# Patient Record
Sex: MALE | Race: WHITE | NOT HISPANIC OR LATINO | Employment: FULL TIME | URBAN - METROPOLITAN AREA
[De-identification: names, ages, dates, MRNs, and addresses within clinical notes are randomized per-mention and may not be internally consistent; named-entity substitution may affect disease eponyms.]

---

## 2017-02-19 ENCOUNTER — OFFICE VISIT - HEALTHEAST (OUTPATIENT)
Dept: FAMILY MEDICINE | Facility: CLINIC | Age: 52
End: 2017-02-19

## 2017-02-19 DIAGNOSIS — M10.9 ACUTE GOUT OF RIGHT HAND, UNSPECIFIED CAUSE: ICD-10-CM

## 2017-02-25 ENCOUNTER — COMMUNICATION - HEALTHEAST (OUTPATIENT)
Dept: FAMILY MEDICINE | Facility: CLINIC | Age: 52
End: 2017-02-25

## 2017-05-22 ENCOUNTER — COMMUNICATION - HEALTHEAST (OUTPATIENT)
Dept: FAMILY MEDICINE | Facility: CLINIC | Age: 52
End: 2017-05-22

## 2017-05-22 DIAGNOSIS — I10 ESSENTIAL HYPERTENSION: ICD-10-CM

## 2017-06-08 ENCOUNTER — OFFICE VISIT - HEALTHEAST (OUTPATIENT)
Dept: FAMILY MEDICINE | Facility: CLINIC | Age: 52
End: 2017-06-08

## 2017-06-08 DIAGNOSIS — M76.60 ACHILLES TENDONITIS: ICD-10-CM

## 2017-06-08 DIAGNOSIS — M79.671 PAIN OF RIGHT HEEL: ICD-10-CM

## 2017-06-26 ENCOUNTER — RECORDS - HEALTHEAST (OUTPATIENT)
Dept: ADMINISTRATIVE | Facility: OTHER | Age: 52
End: 2017-06-26

## 2017-09-07 ENCOUNTER — COMMUNICATION - HEALTHEAST (OUTPATIENT)
Dept: FAMILY MEDICINE | Facility: CLINIC | Age: 52
End: 2017-09-07

## 2017-09-07 DIAGNOSIS — I10 ESSENTIAL HYPERTENSION: ICD-10-CM

## 2017-09-11 ENCOUNTER — COMMUNICATION - HEALTHEAST (OUTPATIENT)
Dept: FAMILY MEDICINE | Facility: CLINIC | Age: 52
End: 2017-09-11

## 2018-01-25 ENCOUNTER — COMMUNICATION - HEALTHEAST (OUTPATIENT)
Dept: FAMILY MEDICINE | Facility: CLINIC | Age: 53
End: 2018-01-25

## 2018-01-25 DIAGNOSIS — I10 ESSENTIAL HYPERTENSION: ICD-10-CM

## 2018-05-28 ENCOUNTER — COMMUNICATION - HEALTHEAST (OUTPATIENT)
Dept: FAMILY MEDICINE | Facility: CLINIC | Age: 53
End: 2018-05-28

## 2018-05-28 DIAGNOSIS — I10 ESSENTIAL HYPERTENSION: ICD-10-CM

## 2018-07-02 ENCOUNTER — OFFICE VISIT - HEALTHEAST (OUTPATIENT)
Dept: FAMILY MEDICINE | Facility: CLINIC | Age: 53
End: 2018-07-02

## 2018-07-02 DIAGNOSIS — R10.9 RIGHT SIDED ABDOMINAL PAIN: ICD-10-CM

## 2018-07-02 LAB
ALBUMIN SERPL-MCNC: 4.3 G/DL (ref 3.5–5)
ALP SERPL-CCNC: 95 U/L (ref 45–120)
ALT SERPL W P-5'-P-CCNC: 24 U/L (ref 0–45)
ANION GAP SERPL CALCULATED.3IONS-SCNC: 10 MMOL/L (ref 5–18)
AST SERPL W P-5'-P-CCNC: 22 U/L (ref 0–40)
BASOPHILS # BLD AUTO: 0.1 THOU/UL (ref 0–0.2)
BASOPHILS NFR BLD AUTO: 1 % (ref 0–2)
BILIRUB DIRECT SERPL-MCNC: 0.4 MG/DL
BILIRUB SERPL-MCNC: 1.6 MG/DL (ref 0–1)
BUN SERPL-MCNC: 7 MG/DL (ref 8–22)
CALCIUM SERPL-MCNC: 10.9 MG/DL (ref 8.5–10.5)
CHLORIDE BLD-SCNC: 103 MMOL/L (ref 98–107)
CO2 SERPL-SCNC: 25 MMOL/L (ref 22–31)
CREAT SERPL-MCNC: 0.79 MG/DL (ref 0.7–1.3)
EOSINOPHIL # BLD AUTO: 0.1 THOU/UL (ref 0–0.4)
EOSINOPHIL NFR BLD AUTO: 2 % (ref 0–6)
ERYTHROCYTE [DISTWIDTH] IN BLOOD BY AUTOMATED COUNT: 14 % (ref 11–14.5)
GFR SERPL CREATININE-BSD FRML MDRD: >60 ML/MIN/1.73M2
GLUCOSE BLD-MCNC: 96 MG/DL (ref 70–125)
HCT VFR BLD AUTO: 40.4 % (ref 40–54)
HGB BLD-MCNC: 13.6 G/DL (ref 14–18)
LIPASE SERPL-CCNC: 34 U/L (ref 0–52)
LYMPHOCYTES # BLD AUTO: 2.3 THOU/UL (ref 0.8–4.4)
LYMPHOCYTES NFR BLD AUTO: 44 % (ref 20–40)
MCH RBC QN AUTO: 29.7 PG (ref 27–34)
MCHC RBC AUTO-ENTMCNC: 33.5 G/DL (ref 32–36)
MCV RBC AUTO: 89 FL (ref 80–100)
MONOCYTES # BLD AUTO: 0.5 THOU/UL (ref 0–0.9)
MONOCYTES NFR BLD AUTO: 9 % (ref 2–10)
NEUTROPHILS # BLD AUTO: 2.4 THOU/UL (ref 2–7.7)
NEUTROPHILS NFR BLD AUTO: 44 % (ref 50–70)
PLATELET # BLD AUTO: 233 THOU/UL (ref 140–440)
PMV BLD AUTO: 7 FL (ref 7–10)
POTASSIUM BLD-SCNC: 4.2 MMOL/L (ref 3.5–5)
PROT SERPL-MCNC: 7.2 G/DL (ref 6–8)
RBC # BLD AUTO: 4.57 MILL/UL (ref 4.4–6.2)
SODIUM SERPL-SCNC: 138 MMOL/L (ref 136–145)
WBC: 5.3 THOU/UL (ref 4–11)

## 2018-07-03 ENCOUNTER — OFFICE VISIT - HEALTHEAST (OUTPATIENT)
Dept: FAMILY MEDICINE | Facility: CLINIC | Age: 53
End: 2018-07-03

## 2018-07-03 ENCOUNTER — HOSPITAL ENCOUNTER (OUTPATIENT)
Dept: CT IMAGING | Facility: CLINIC | Age: 53
Discharge: HOME | End: 2018-07-03

## 2018-07-03 DIAGNOSIS — M10.9 GOUTY ARTHROPATHY: ICD-10-CM

## 2018-07-03 DIAGNOSIS — R93.5 ABNORMAL CT SCAN, PELVIS: ICD-10-CM

## 2018-07-03 DIAGNOSIS — I10 ESSENTIAL HYPERTENSION: ICD-10-CM

## 2018-07-03 DIAGNOSIS — R10.9 RIGHT SIDED ABDOMINAL PAIN: ICD-10-CM

## 2018-07-03 DIAGNOSIS — R10.11 RUQ ABDOMINAL PAIN: ICD-10-CM

## 2018-07-03 LAB — URATE SERPL-MCNC: 6 MG/DL (ref 3–8)

## 2018-07-03 ASSESSMENT — MIFFLIN-ST. JEOR: SCORE: 1796.51

## 2018-08-02 ENCOUNTER — COMMUNICATION - HEALTHEAST (OUTPATIENT)
Dept: FAMILY MEDICINE | Facility: CLINIC | Age: 53
End: 2018-08-02

## 2018-10-05 ENCOUNTER — COMMUNICATION - HEALTHEAST (OUTPATIENT)
Dept: FAMILY MEDICINE | Facility: CLINIC | Age: 53
End: 2018-10-05

## 2018-10-05 DIAGNOSIS — I10 ESSENTIAL HYPERTENSION: ICD-10-CM

## 2019-03-28 ENCOUNTER — RECORDS - HEALTHEAST (OUTPATIENT)
Dept: ADMINISTRATIVE | Facility: OTHER | Age: 54
End: 2019-03-28

## 2019-08-05 ENCOUNTER — OFFICE VISIT - HEALTHEAST (OUTPATIENT)
Dept: FAMILY MEDICINE | Facility: CLINIC | Age: 54
End: 2019-08-05

## 2019-08-05 DIAGNOSIS — Z01.818 PREOP GENERAL PHYSICAL EXAM: ICD-10-CM

## 2019-08-05 DIAGNOSIS — E55.9 VITAMIN D DEFICIENCY: ICD-10-CM

## 2019-08-05 DIAGNOSIS — M10.9 GOUTY ARTHROPATHY: ICD-10-CM

## 2019-08-05 DIAGNOSIS — H26.9 CATARACT OF BOTH EYES, UNSPECIFIED CATARACT TYPE: ICD-10-CM

## 2019-08-05 DIAGNOSIS — I10 ESSENTIAL HYPERTENSION: ICD-10-CM

## 2019-08-05 DIAGNOSIS — D64.9 ANEMIA, UNSPECIFIED TYPE: ICD-10-CM

## 2019-08-05 DIAGNOSIS — Z12.11 SCREEN FOR COLON CANCER: ICD-10-CM

## 2019-08-05 LAB
ANION GAP SERPL CALCULATED.3IONS-SCNC: 8 MMOL/L (ref 5–18)
BUN SERPL-MCNC: 9 MG/DL (ref 8–22)
CALCIUM SERPL-MCNC: 10.9 MG/DL (ref 8.5–10.5)
CHLORIDE BLD-SCNC: 102 MMOL/L (ref 98–107)
CO2 SERPL-SCNC: 28 MMOL/L (ref 22–31)
CREAT SERPL-MCNC: 0.95 MG/DL (ref 0.7–1.3)
ERYTHROCYTE [DISTWIDTH] IN BLOOD BY AUTOMATED COUNT: 13.7 % (ref 11–14.5)
GFR SERPL CREATININE-BSD FRML MDRD: >60 ML/MIN/1.73M2
GLUCOSE BLD-MCNC: 88 MG/DL (ref 70–125)
HCT VFR BLD AUTO: 38.6 % (ref 40–54)
HGB BLD-MCNC: 13.3 G/DL (ref 14–18)
MCH RBC QN AUTO: 30.9 PG (ref 27–34)
MCHC RBC AUTO-ENTMCNC: 34.5 G/DL (ref 32–36)
MCV RBC AUTO: 90 FL (ref 80–100)
PLATELET # BLD AUTO: 266 THOU/UL (ref 140–440)
PMV BLD AUTO: 7 FL (ref 7–10)
POTASSIUM BLD-SCNC: 4.4 MMOL/L (ref 3.5–5)
RBC # BLD AUTO: 4.3 MILL/UL (ref 4.4–6.2)
SODIUM SERPL-SCNC: 138 MMOL/L (ref 136–145)
URATE SERPL-MCNC: 9 MG/DL (ref 3–8)
WBC: 4.8 THOU/UL (ref 4–11)

## 2019-08-05 ASSESSMENT — MIFFLIN-ST. JEOR: SCORE: 1831.44

## 2019-08-06 LAB
25(OH)D3 SERPL-MCNC: 12.8 NG/ML (ref 30–80)
25(OH)D3 SERPL-MCNC: 12.8 NG/ML (ref 30–80)

## 2019-08-11 ENCOUNTER — AMBULATORY - HEALTHEAST (OUTPATIENT)
Dept: FAMILY MEDICINE | Facility: CLINIC | Age: 54
End: 2019-08-11

## 2019-08-11 DIAGNOSIS — M10.9 GOUTY ARTHROPATHY: ICD-10-CM

## 2019-08-11 DIAGNOSIS — E55.9 VITAMIN D DEFICIENCY: ICD-10-CM

## 2019-08-13 ENCOUNTER — COMMUNICATION - HEALTHEAST (OUTPATIENT)
Dept: FAMILY MEDICINE | Facility: CLINIC | Age: 54
End: 2019-08-13

## 2019-08-13 ENCOUNTER — COMMUNICATION - HEALTHEAST (OUTPATIENT)
Dept: INTERNAL MEDICINE | Facility: CLINIC | Age: 54
End: 2019-08-13

## 2019-10-09 ENCOUNTER — RECORDS - HEALTHEAST (OUTPATIENT)
Dept: ADMINISTRATIVE | Facility: OTHER | Age: 54
End: 2019-10-09

## 2019-10-14 ENCOUNTER — COMMUNICATION - HEALTHEAST (OUTPATIENT)
Dept: FAMILY MEDICINE | Facility: CLINIC | Age: 54
End: 2019-10-14

## 2019-10-14 DIAGNOSIS — I10 ESSENTIAL HYPERTENSION: ICD-10-CM

## 2019-10-16 ENCOUNTER — COMMUNICATION - HEALTHEAST (OUTPATIENT)
Dept: FAMILY MEDICINE | Facility: CLINIC | Age: 54
End: 2019-10-16

## 2019-10-16 DIAGNOSIS — E55.9 VITAMIN D DEFICIENCY: ICD-10-CM

## 2020-09-06 ENCOUNTER — COMMUNICATION - HEALTHEAST (OUTPATIENT)
Dept: FAMILY MEDICINE | Facility: CLINIC | Age: 55
End: 2020-09-06

## 2020-09-06 DIAGNOSIS — M10.9 GOUTY ARTHROPATHY: ICD-10-CM

## 2020-09-15 ENCOUNTER — COMMUNICATION - HEALTHEAST (OUTPATIENT)
Dept: FAMILY MEDICINE | Facility: CLINIC | Age: 55
End: 2020-09-15

## 2020-09-15 ENCOUNTER — OFFICE VISIT - HEALTHEAST (OUTPATIENT)
Dept: FAMILY MEDICINE | Facility: CLINIC | Age: 55
End: 2020-09-15

## 2020-09-15 DIAGNOSIS — M25.561 CHRONIC PAIN OF RIGHT KNEE: ICD-10-CM

## 2020-09-15 DIAGNOSIS — Z00.00 ROUTINE GENERAL MEDICAL EXAMINATION AT A HEALTH CARE FACILITY: ICD-10-CM

## 2020-09-15 DIAGNOSIS — D64.9 ANEMIA, UNSPECIFIED TYPE: ICD-10-CM

## 2020-09-15 DIAGNOSIS — G89.29 CHRONIC PAIN OF RIGHT KNEE: ICD-10-CM

## 2020-09-15 DIAGNOSIS — M10.9 GOUTY ARTHROPATHY: ICD-10-CM

## 2020-09-15 DIAGNOSIS — E55.9 VITAMIN D DEFICIENCY: ICD-10-CM

## 2020-09-15 DIAGNOSIS — I10 ESSENTIAL HYPERTENSION: ICD-10-CM

## 2020-09-15 DIAGNOSIS — Z23 ENCOUNTER FOR IMMUNIZATION: ICD-10-CM

## 2020-09-15 LAB
ALBUMIN SERPL-MCNC: 4.4 G/DL (ref 3.5–5)
ALP SERPL-CCNC: 99 U/L (ref 45–120)
ALT SERPL W P-5'-P-CCNC: 28 U/L (ref 0–45)
ANION GAP SERPL CALCULATED.3IONS-SCNC: 6 MMOL/L (ref 5–18)
AST SERPL W P-5'-P-CCNC: 25 U/L (ref 0–40)
BASOPHILS # BLD AUTO: 0 THOU/UL (ref 0–0.2)
BASOPHILS NFR BLD AUTO: 1 % (ref 0–2)
BILIRUB SERPL-MCNC: 1.5 MG/DL (ref 0–1)
BUN SERPL-MCNC: 6 MG/DL (ref 8–22)
CALCIUM SERPL-MCNC: 10.9 MG/DL (ref 8.5–10.5)
CHLORIDE BLD-SCNC: 104 MMOL/L (ref 98–107)
CHOLEST SERPL-MCNC: 209 MG/DL
CO2 SERPL-SCNC: 29 MMOL/L (ref 22–31)
CREAT SERPL-MCNC: 0.89 MG/DL (ref 0.7–1.3)
EOSINOPHIL # BLD AUTO: 0.2 THOU/UL (ref 0–0.4)
EOSINOPHIL NFR BLD AUTO: 4 % (ref 0–6)
ERYTHROCYTE [DISTWIDTH] IN BLOOD BY AUTOMATED COUNT: 13.8 % (ref 11–14.5)
FASTING STATUS PATIENT QL REPORTED: NO
GFR SERPL CREATININE-BSD FRML MDRD: >60 ML/MIN/1.73M2
GLUCOSE BLD-MCNC: 85 MG/DL (ref 70–125)
HCT VFR BLD AUTO: 40.7 % (ref 40–54)
HDLC SERPL-MCNC: 31 MG/DL
HGB BLD-MCNC: 13.8 G/DL (ref 14–18)
HIV 1+2 AB+HIV1 P24 AG SERPL QL IA: NEGATIVE
LDLC SERPL CALC-MCNC: 132 MG/DL
LYMPHOCYTES # BLD AUTO: 2 THOU/UL (ref 0.8–4.4)
LYMPHOCYTES NFR BLD AUTO: 48 % (ref 20–40)
MCH RBC QN AUTO: 31.1 PG (ref 27–34)
MCHC RBC AUTO-ENTMCNC: 34 G/DL (ref 32–36)
MCV RBC AUTO: 92 FL (ref 80–100)
MONOCYTES # BLD AUTO: 0.3 THOU/UL (ref 0–0.9)
MONOCYTES NFR BLD AUTO: 8 % (ref 2–10)
NEUTROPHILS # BLD AUTO: 1.7 THOU/UL (ref 2–7.7)
NEUTROPHILS NFR BLD AUTO: 39 % (ref 50–70)
PLATELET # BLD AUTO: 236 THOU/UL (ref 140–440)
PMV BLD AUTO: 7.4 FL (ref 7–10)
POTASSIUM BLD-SCNC: 4.4 MMOL/L (ref 3.5–5)
PROT SERPL-MCNC: 7.1 G/DL (ref 6–8)
RBC # BLD AUTO: 4.44 MILL/UL (ref 4.4–6.2)
SODIUM SERPL-SCNC: 139 MMOL/L (ref 136–145)
TRIGL SERPL-MCNC: 229 MG/DL
WBC: 4.3 THOU/UL (ref 4–11)

## 2020-09-15 ASSESSMENT — MIFFLIN-ST. JEOR: SCORE: 1783.81

## 2020-09-16 ENCOUNTER — AMBULATORY - HEALTHEAST (OUTPATIENT)
Dept: FAMILY MEDICINE | Facility: CLINIC | Age: 55
End: 2020-09-16

## 2020-09-16 DIAGNOSIS — E55.9 VITAMIN D DEFICIENCY: ICD-10-CM

## 2020-09-16 LAB
25(OH)D3 SERPL-MCNC: 21.5 NG/ML (ref 30–80)
25(OH)D3 SERPL-MCNC: 21.5 NG/ML (ref 30–80)

## 2020-09-21 ENCOUNTER — COMMUNICATION - HEALTHEAST (OUTPATIENT)
Dept: FAMILY MEDICINE | Facility: CLINIC | Age: 55
End: 2020-09-21

## 2020-12-09 ENCOUNTER — COMMUNICATION - HEALTHEAST (OUTPATIENT)
Dept: FAMILY MEDICINE | Facility: CLINIC | Age: 55
End: 2020-12-09

## 2020-12-09 DIAGNOSIS — M10.9 GOUTY ARTHROPATHY: ICD-10-CM

## 2021-02-16 ENCOUNTER — COMMUNICATION - HEALTHEAST (OUTPATIENT)
Dept: FAMILY MEDICINE | Facility: CLINIC | Age: 56
End: 2021-02-16

## 2021-02-16 DIAGNOSIS — E55.9 VITAMIN D DEFICIENCY: ICD-10-CM

## 2021-02-18 ENCOUNTER — AMBULATORY - HEALTHEAST (OUTPATIENT)
Dept: LAB | Facility: CLINIC | Age: 56
End: 2021-02-18

## 2021-02-18 DIAGNOSIS — E55.9 VITAMIN D DEFICIENCY: ICD-10-CM

## 2021-02-19 LAB
25(OH)D3 SERPL-MCNC: 19.1 NG/ML (ref 30–80)
25(OH)D3 SERPL-MCNC: 19.1 NG/ML (ref 30–80)

## 2021-02-22 ENCOUNTER — AMBULATORY - HEALTHEAST (OUTPATIENT)
Dept: FAMILY MEDICINE | Facility: CLINIC | Age: 56
End: 2021-02-22

## 2021-02-22 DIAGNOSIS — E55.9 VITAMIN D DEFICIENCY: ICD-10-CM

## 2021-03-15 ENCOUNTER — OFFICE VISIT - HEALTHEAST (OUTPATIENT)
Dept: FAMILY MEDICINE | Facility: CLINIC | Age: 56
End: 2021-03-15

## 2021-03-15 DIAGNOSIS — I10 ESSENTIAL HYPERTENSION: ICD-10-CM

## 2021-03-15 DIAGNOSIS — E55.9 VITAMIN D DEFICIENCY: ICD-10-CM

## 2021-03-15 DIAGNOSIS — Z12.11 SCREEN FOR COLON CANCER: ICD-10-CM

## 2021-03-15 DIAGNOSIS — M10.9 GOUTY ARTHROPATHY: ICD-10-CM

## 2021-03-15 DIAGNOSIS — Z71.6 ENCOUNTER FOR TOBACCO USE CESSATION COUNSELING: ICD-10-CM

## 2021-03-15 ASSESSMENT — MIFFLIN-ST. JEOR: SCORE: 1736.18

## 2021-03-16 ENCOUNTER — COMMUNICATION - HEALTHEAST (OUTPATIENT)
Dept: FAMILY MEDICINE | Facility: CLINIC | Age: 56
End: 2021-03-16

## 2021-04-07 ENCOUNTER — COMMUNICATION - HEALTHEAST (OUTPATIENT)
Dept: FAMILY MEDICINE | Facility: CLINIC | Age: 56
End: 2021-04-07

## 2021-04-07 DIAGNOSIS — Z71.6 ENCOUNTER FOR TOBACCO USE CESSATION COUNSELING: ICD-10-CM

## 2021-05-10 ENCOUNTER — COMMUNICATION - HEALTHEAST (OUTPATIENT)
Dept: FAMILY MEDICINE | Facility: CLINIC | Age: 56
End: 2021-05-10

## 2021-05-10 DIAGNOSIS — E55.9 VITAMIN D DEFICIENCY: ICD-10-CM

## 2021-05-13 ENCOUNTER — COMMUNICATION - HEALTHEAST (OUTPATIENT)
Dept: FAMILY MEDICINE | Facility: CLINIC | Age: 56
End: 2021-05-13

## 2021-05-13 DIAGNOSIS — E55.9 VITAMIN D DEFICIENCY: ICD-10-CM

## 2021-05-29 ENCOUNTER — RECORDS - HEALTHEAST (OUTPATIENT)
Dept: ADMINISTRATIVE | Facility: CLINIC | Age: 56
End: 2021-05-29

## 2021-05-30 VITALS — WEIGHT: 213 LBS | BODY MASS INDEX: 29.71 KG/M2

## 2021-05-31 ENCOUNTER — RECORDS - HEALTHEAST (OUTPATIENT)
Dept: ADMINISTRATIVE | Facility: CLINIC | Age: 56
End: 2021-05-31

## 2021-05-31 VITALS — BODY MASS INDEX: 30.27 KG/M2 | WEIGHT: 217 LBS

## 2021-05-31 NOTE — TELEPHONE ENCOUNTER
----- Message from Jessica Jones MD sent at 8/11/2019  4:44 PM CDT -----  Your vitamin D level is low.  This can cause you to feel tired, can worsen your immune system's function, and can worsen your bone density.  Let's bring your level up quickly with high dose vitamin D 50,000 units TWICE WEEKLY for 8 weeks.  We should then recheck your level (a lab only visit).

## 2021-06-01 ENCOUNTER — RECORDS - HEALTHEAST (OUTPATIENT)
Dept: ADMINISTRATIVE | Facility: CLINIC | Age: 56
End: 2021-06-01

## 2021-06-01 VITALS — BODY MASS INDEX: 29.36 KG/M2 | WEIGHT: 210.5 LBS

## 2021-06-01 VITALS — WEIGHT: 209.3 LBS | BODY MASS INDEX: 29.3 KG/M2 | HEIGHT: 71 IN

## 2021-06-02 NOTE — TELEPHONE ENCOUNTER
Refill Approved    Rx renewed per Medication Renewal Policy. Medication was last renewed on 10/5/18.    Kelli Marcum, [facility] Triage/Med Refill 10/15/2019     Requested Prescriptions   Pending Prescriptions Disp Refills     lisinopril (PRINIVIL,ZESTRIL) 5 MG tablet [Pharmacy Med Name: LISINOPRIL 5 MG TABLET] 30 tablet 11     Sig: TAKE 1 TABLET BY MOUTH EVERY DAY       [facility] [employer] Refill Protocol Passed - 10/14/2019  1:22 AM        Passed - PCP or prescribing provider visit in past 12 months       Last office visit with prescriber/PCP: 7/3/2018 Jessica Jones MD OR same dept: Visit date not found OR same specialty: 7/3/2018 Jessica Jones MD  Last physical: 8/5/2019 Last MTM visit: Visit date not found   Next visit within 3 mo: Visit date not found  Next physical within 3 mo: Visit date not found  Prescriber OR PCP: Jessica Jones MD  Last diagnosis associated with med order: 1. Essential hypertension  - lisinopril (PRINIVIL,ZESTRIL) 5 MG tablet [Pharmacy Med Name: LISINOPRIL 5 MG TABLET]; TAKE 1 TABLET BY MOUTH EVERY DAY  Dispense: 30 tablet; Refill: 11    If protocol passes may refill for 12 months if within 3 months of last provider visit (or a total of 15 months).             Passed - Serum Potassium in past 12 months     Lab Results   Component Value Date    Potassium 4.4 08/05/2019             Passed - Blood pressure filed in past 12 months     BP Readings from Last 1 Encounters:   08/05/19 120/76             Passed - Serum Creatinine in past 12 months     Creatinine   Date Value Ref Range Status   08/05/2019 0.95 0.70 - 1.30 mg/dL Final

## 2021-06-03 VITALS — HEIGHT: 71 IN | WEIGHT: 217 LBS | BODY MASS INDEX: 30.38 KG/M2

## 2021-06-05 VITALS
HEIGHT: 71 IN | DIASTOLIC BLOOD PRESSURE: 88 MMHG | HEART RATE: 48 BPM | RESPIRATION RATE: 20 BRPM | TEMPERATURE: 97.9 F | WEIGHT: 196 LBS | BODY MASS INDEX: 27.44 KG/M2 | SYSTOLIC BLOOD PRESSURE: 140 MMHG | OXYGEN SATURATION: 99 %

## 2021-06-05 VITALS
BODY MASS INDEX: 28.91 KG/M2 | HEART RATE: 80 BPM | DIASTOLIC BLOOD PRESSURE: 98 MMHG | WEIGHT: 206.5 LBS | SYSTOLIC BLOOD PRESSURE: 168 MMHG | HEIGHT: 71 IN

## 2021-06-08 NOTE — PROGRESS NOTES
S: Mr. Lawson is a 51 year old with a history of gout who presents with pain in his right thumb since Monday. He has a history of gout which has always manifested in his toes in the past and has never had symptoms in his hands. He reports that the pain and the way that his thumb looks is very similar to his previous attacks. He called his pharmacy and PCP on Thursday to get refills of Prednisone which is usually what works the best for his flares, but had been unable to get a prescription filled. He has been taking tylenol and had not gotten any significant relief from the pain. He denies any other illness recently. Has not been exposed to ticks or bites that he knows of. Eats very little red meat and has not been drinking any beer. He reports that in the past there was discussion about uric acid lowering therapy, but was decided against because he had not had frequent flares. Reviewed some previous labs and clinic records with the patient. Last uric acid level was drawn in 2012 when not having flare and was 5.2, previously elevated to 9.2 in 2011. Patient is interested in considering therapy but has not seen his PCP in close to 1.5 years and would like to make an appointment to discuss it with her.     O:  Vitals:    02/19/17 1549   BP: 126/80   Pulse: 60   Resp: 18   Temp: 98.2  F (36.8  C)   SpO2: 97%     General: Calm appearing male in no apparent distress  Extremities: Swollen and erythematous metacarpophalangeal joint of the right hand. Tender to palpation. ROM intact in all directions but limited slightly due to pain. Sensation intact in distal thumb and all other phalanges. No swelling in the left hand or the wrists bilaterally.     A/P:  1. Acute gout of right hand, unspecified cause: Will treat symptoms with Prednisone taper which has worked for him in the past. Instructed to follow-up with PCP to discuss further management of gout. Return to clinic if not better in 24-48 hours.  - predniSONE (DELTASONE)  10 MG tablet; TAKE 4 TABS ONCE DAILY FOR 3 DAYS, THEN 3 TABS DAILY FOR 2 DAYS, THEN 2 TABS DAILY FOR 2 DAYS AS DIRECTED  Dispense: 22 tablet; Refill: 0      Va King MD - PGY-3

## 2021-06-11 NOTE — TELEPHONE ENCOUNTER
RN cannot approve Refill Request    RN can NOT refill this medication Protocol failed and NO refill given. Last office visit: 7/3/2018 Jessica Jones MD Last Physical: 8/5/2019 Last MTM visit: Visit date not found Last visit same specialty: 7/3/2018 Jessica Jones MD.  Next visit within 3 mo: Visit date not found  Next physical within 3 mo: Visit date not found      Ruthie Waggoner, [facility] Triage/Med Refill 9/6/2020    Requested Prescriptions   Pending Prescriptions Disp Refills     allopurinoL (ZYLOPRIM) 300 MG tablet [Pharmacy Med Name: ALLOPURINOL 300 MG TABLET] 30 tablet 11     Sig: TAKE 1 TABLET BY MOUTH EVERY DAY       There is no refill protocol information for this order

## 2021-06-11 NOTE — PROGRESS NOTES
Assessment:     1. Routine general medical examination at a health care facility  Influenza, Recombinant, Inj, Quadrivalent, PF, 18+YRS    Lipid Cascade    HIV Antigen/Antibody Screening [place]    Ambulatory referral for Colonoscopy   2. Essential hypertension  Comprehensive Metabolic Panel    lisinopriL (PRINIVIL,ZESTRIL) 10 MG tablet   3. Gout     4. Anemia, unspecified type  HM1(CBC and Differential)    HM1 (CBC with Diff)   5. Vitamin D deficiency  Vitamin D, Total (25-Hydroxy)   6. Encounter for immunization  CANCELED: Influenza, Recombinant, Inj, Quadrivalent, PF, 18+YRS   7. Chronic pain of right knee  Ambulatory referral to Orthopedic Surgery        Plan:      1. Routine healthcare maintenance.  Preventative cares reviewed.  Influenza vaccine administered today.  Will check fasting lipids and routine HIV screening.  Referral placed for colonoscopy.  Return to clinic in 1 year for next annual exam or sooner with any new concerns.    2.  Blood pressure is elevated today at 168/98.  Will increase lisinopril to 10 mg once daily.  Patient is advised to follow-up in 2 weeks for blood pressure check.  Notify us sooner with any symptoms.  Encouraged healthy lifestyle modifications regards to diet and exercise.    3.  Gout is chronic and stable.  Continue allopurinol for management of gout.    4.  History of anemia noted.  Will check complete blood count today to ensure stability.    5.  History of vitamin D deficiency monitor.  Will check vitamin D level.    6.  Influenza vaccine administered today.    7.  Referral placed to orthopedics for further evaluation of right knee pain.  We discussed conservative measures in the meantime including rest, elevation, heat and Tylenol for pain management.               Subjective:      Arthur Lawson is a 55 y.o. male who presents for an annual exam.  Patient is doing well overall today.  Past medical history is significant for hypertension, currently on lisinopril 5 mg  daily.  Does not routinely check blood pressure at home.  Was previously on higher dose of lisinopril but per patient had episodes of dizziness which she attributed to the high dose of lisinopril.  Denies any chest pain, shortness of breath, headaches or blurred vision.  He describes having history of Lasek eye surgery and continues to have problems with his eyes due to this surgery.  he has gout and remains on allopurinol for preventative management.  Tolerating well.  Denies any recent flareups.  Typically managed with ibuprofen.  He has history of anemia with hemoglobin in the 13-14 range per chart review.  Also history of vitamin D deficiency noted.  He reports having some right knee pain which is sometimes attributed to his right hip as well.  Pain has been going on for several months without known injury.  He reports having his right knee evaluated by orthopedics a few years ago without any abnormalities found on imaging.  He is interested in another referral today.  Patient would like routine labs checked today.  He is due for colonoscopy.  He is also interested in influenza vaccine.  Patient denies any recent illness reports feeling well today.  No additional concerns.    Healthy Habits:  Regular Exercise: Yes  Healthy Diet: Yes  Colonoscopy: Yes, referral placed  Lipid Profile: Yes  Glucose Screen: Yes    Immunization History   Administered Date(s) Administered     Td, Adult, Absorbed 07/16/1993, 01/01/2003     Tdap 08/05/2019     Immunization status: up to date and documented.    Current Outpatient Medications   Medication Sig Dispense Refill     allopurinoL (ZYLOPRIM) 300 MG tablet TAKE 1 TABLET BY MOUTH EVERY DAY 30 tablet 1     cholecalciferol, vitamin D3, 50,000 unit capsule Take one cap by mouth TWICE WEEKLY for 8 weeks 16 capsule 0     ibuprofen (ADVIL,MOTRIN) 200 MG tablet Take 200 mg by mouth every 6 (six) hours as needed for pain.       lisinopriL (PRINIVIL,ZESTRIL) 10 MG tablet Take 1 tablet (10  mg total) by mouth daily. 30 tablet 11     No current facility-administered medications for this visit.      Past Medical History:   Diagnosis Date     Essential Hypertension     Created by Conversion      Gout     Created by Conversion      Normochromic, Normocytic Anemia     Created by Conversion      Tendonitis Of The Achilles Tendon     Created by Conversion      Past Surgical History:   Procedure Laterality Date     FINGER SURGERY       INGUINAL HERNIA REPAIR       left knee arthroscopy       REFRACTIVE SURGERY       Hydrochlorothiazide; Penicillins; and Polycillin  Family History   Problem Relation Age of Onset     Colon cancer Father      Retinal detachment Father      Pacemaker Father      Retinal detachment Paternal Grandfather      Social History     Socioeconomic History     Marital status:      Spouse name: Nikkie     Number of children: Not on file     Years of education: Not on file     Highest education level: Not on file   Occupational History     Occupation:    Social Needs     Financial resource strain: Not on file     Food insecurity     Worry: Not on file     Inability: Not on file     Transportation needs     Medical: Not on file     Non-medical: Not on file   Tobacco Use     Smoking status: Never Smoker     Smokeless tobacco: Current User     Tobacco comment: Chews.   Substance and Sexual Activity     Alcohol use: Yes     Frequency: 2-4 times a month     Comment: Socially     Drug use: No     Sexual activity: Not on file   Lifestyle     Physical activity     Days per week: Not on file     Minutes per session: Not on file     Stress: Not on file   Relationships     Social connections     Talks on phone: Not on file     Gets together: Not on file     Attends Muslim service: Not on file     Active member of club or organization: Not on file     Attends meetings of clubs or organizations: Not on file     Relationship status: Not on file     Intimate partner violence     Fear of current  "or ex partner: Not on file     Emotionally abused: Not on file     Physically abused: Not on file     Forced sexual activity: Not on file   Other Topics Concern     Not on file   Social History Narrative     Not on file       Review of Systems  Comprehensive ROS: as above, otherwise all negative.           Objective:     BP (!) 168/98   Pulse 80   Ht 5' 11\" (1.803 m)   Wt 206 lb 8 oz (93.7 kg)   BMI 28.80 kg/m    Body mass index is 28.8 kg/m .    Physical    General Appearance:    Alert, cooperative, no distress, appears stated age.     Head:    Normocephalic, without obvious abnormality, atraumatic   Eyes:    PERRL, conjunctiva/corneas clear, EOM's intact, fundi     benign, both eyes        Ears:    Normal TM's and external ear canals, both ears   Nose:   Nares normal, septum midline, mucosa normal, no drainage    or sinus tenderness   Throat:   Lips, mucosa, and tongue normal; teeth and gums normal   Neck:   Supple, symmetrical, trachea midline, no adenopathy.   Back:     Symmetric, no curvature, ROM normal, no CVA tenderness   Lungs:     Clear to auscultation bilaterally, respirations unlabored   Heart:    Regular rate and rhythm, S1 and S2 normal, no murmur, rub   or gallop   Abdomen:     Soft, non-tender, bowel sounds active all four quadrants,     no masses, no organomegaly.     Genitalia:    Deferred   Rectal:    Deferred   Extremities:   Extremities normal, atraumatic, no cyanosis or edema   Pulses:   2+ and symmetric all extremities   Skin:   Skin color, texture, turgor normal, no rashes or lesions   Lymph nodes:   Cervical, supraclavicular, and axillary nodes normal   Neurologic:   CNII-XII grossly intact.             This note has been dictated using voice recognition software. Any grammatical or context distortions are unintentional and inherent to the use of this software.     "

## 2021-06-11 NOTE — PROGRESS NOTES
"ASSESSMENT/PLAN:   1. Achilles tendonitis     2. Pain of right heel  XR Ankle Right 3 or More VWS    XR Foot Right 3 or More VWS       Negative xrays. Exam not consistent with gout flare. Presentation is very consistent with achilles tendonitis. We will immobilize him in a CAM boot x 6 weeks. Patient has a boot at home from prior bout with achilles tendonitis, so he will use that. Recommended other supportive cares. Recommended f/up with PCP in 6 weeks to ensure symptomatic improvement- at that point, he could come out of boot. If persistent symptoms, referral to podiatry would be appropriate.  At the end of the encounter, I discussed results, diagnosis, treatment plan. Discussed red flags for immediate return to clinic/ER, as well as indications for follow up if no improvement. Patient understood and agreed to plan. Patient was stable for discharge.      Patient Instructions:  Patient Instructions   Your x-rays did not show any broken bones.  There was mention of a scar on the second toe bone, likely related to prior injury.    Most likely, your symptoms today are due to Achilles tendinitis.    Please use her boot at home for 6 weeks.     Take Aleve 500 mg twice daily for pain.  Take Tylenol every 4-6 hours as needed for further pain on top of that.    Ice and elevate the leg as much as possible over the next several days.    Rest from any activities that exacerbate your pain.    Follow-up with your regular doctor in 6 weeks to ensure improvement.    Come back sooner if worsening pain or new problems.                    SUBJECTIVE:   Arthur Lawson is a 51 y.o. male with a history including gout, anemia, and HTN, who presents today for evaluation of right heel pain x 1 week. It is getting much worse. He says it started with a \"sharp pain\" every once in a while, but now today it is with every step. No known injuries. No recent changes in activity. No foot swelling or redness. He does have a history of gout in the " past but this feels very different. No fevers. No other joint pains.  He did have something similar to this before in his left foot and was found to be achilles tedonitis. He was put in a boot for this and had complete resolution.  He has not taken anything for pain yet.      Past Medical History:  Patient Active Problem List   Diagnosis     Gout     Normochromic, Normocytic Anemia     Essential Hypertension     Motion Sickness     Midback Pain     Acute bronchitis     Tendonitis Of The Achilles Tendon      Vitamin D deficiency       Surgical History:  Left arthroscopic knee surgery - 1982  Otherwise reviewed, noncontributory    Family History:  Reviewed; Non-contributory      Social History:    History   Smoking Status     Never Smoker   Smokeless Tobacco     Current User     Comment: Chews.     Smoking: none  Chew tobacco  Alcohol use: occasionally  Other drug use: none  Occupation: manager at [employer]      Current Medications:  Current Outpatient Prescriptions on File Prior to Visit   Medication Sig Dispense Refill     aspirin 81 MG EC tablet Take 81 mg by mouth daily.       lisinopril (PRINIVIL,ZESTRIL) 5 MG tablet TAKE ONE TABLET BY MOUTH ONCE DAILY 90 tablet 0     predniSONE (DELTASONE) 10 MG tablet TAKE 4 TABS ONCE DAILY FOR 3 DAYS, THEN 3 TABS DAILY FOR 2 DAYS, THEN 2 TABS DAILY FOR 2 DAYS AS DIRECTED. 22 tablet 0     predniSONE (DELTASONE) 10 MG tablet TAKE 4 TABS ONCE DAILY FOR 3 DAYS, THEN 3 TABS DAILY FOR 2 DAYS, THEN 2 TABS DAILY FOR 2 DAYS AS DIRECTED 22 tablet 0     predniSONE (DELTASONE) 10 MG tablet TAKE 4 TABS ONCE DAILY FOR 3 DAYS, THEN 3 TABS DAILY FOR 2 DAYS, THEN 2 TABS DAILY FOR 2 DAYS AS DIRECTED. 22 tablet 0     No current facility-administered medications on file prior to visit.        Allergies:   Allergies   Allergen Reactions     Hydrochlorothiazide      Gout flair       Penicillins      Polycillin        I personally reviewed patient's past medical, surgical, social, family  history and allergies.    ROS:  Review of Systems  See HPI, otherwise negative      OBJECTIVE:   Vitals:    06/08/17 1755   BP: 124/70   Patient Site: Right Arm   Patient Position: Sitting   Cuff Size: Adult Regular   Pulse: 73   Resp: 20   Temp: 97.6  F (36.4  C)   TempSrc: Oral   SpO2: 96%   Weight: 217 lb (98.4 kg)     General: Alert, orientated, no acute distress.  Appears stated age.  Musculoskeletal: Right lower extremity: DP pulse 2+. No gross deformities. No erythema or joint effusions. No bony tenderness. No tenderness over achilles tendon. No plantar fascia tenderness. AROM: patient reports with with ankle dorsiflexion, none with plantarflexion. 5/5 strength for both movements. Negative Rao test. Gait: patient walks with limp favoring left side.  Skin: Normal color, no rashes, abrasions or lacerations  Neuro: Alert and orientated appropriately.  Normal sensation and strength.  No focal deficits.         Radiology:  I personally ordered and viewed this study. I agree with below radiology findings.    Xr Ankle Right 3 Or More Vws    Result Date: 6/8/2017  XR ANKLE RIGHT 3 OR MORE VWS 6/8/2017 6:37 PM INDICATION: Pain in right foot COMPARISON: None. FINDINGS: Mild degenerative change of the right ankle. No fracture or dislocation. Calcaneal enthesophyte. Mild soft tissue swelling. This report was electronically interpreted by: Dr. Jaiden Singh DO ON 06/08/2017 at 18:41    Xr Foot Right 3 Or More Vws    Result Date: 6/8/2017  XR FOOT RIGHT 3 OR MORE VWS 6/8/2017 6:37 PM INDICATION: Pain in right foot COMPARISON: None. FINDINGS: Degenerative and hypertrophic changes most pronounced along the first digit MTP and interphalangeal joints. No fracture or dislocation. Mild cortical blurring of the second digit proximal phalanx at mid shaft, possibly related to prior injury. This report was electronically interpreted by: Dr. Jaiden Singh DO ON 06/08/2017 at 18:43

## 2021-06-13 NOTE — TELEPHONE ENCOUNTER
RN cannot approve Refill Request    RN can NOT refill this medication med is not covered by policy/route to provider. Last office visit: 7/3/2018 Jessica Jones MD Last Physical: 8/5/2019 Last MTM visit: Visit date not found Last visit same specialty: 7/3/2018 Jessica Jones MD.  Next visit within 3 mo: Visit date not found  Next physical within 3 mo: Visit date not found      Kelli Marcum, [facility] Triage/Med Refill 12/10/2020    Requested Prescriptions   Pending Prescriptions Disp Refills     allopurinoL (ZYLOPRIM) 300 MG tablet [Pharmacy Med Name: ALLOPURINOL 300 MG TABLET] 30 tablet 1     Sig: TAKE 1 TABLET BY MOUTH EVERY DAY       There is no refill protocol information for this order

## 2021-06-16 NOTE — PROGRESS NOTES
Assessment/Plan:     1. Essential hypertension  Adequately controlled on current regimen.  Encouraged continued efforts at healthy lifestyle habits including increased exercise, reduction in sodium intake, and congratulated him on his elimination of preceding beverages.  Continue to work on tobacco cessation.  - lisinopriL (PRINIVIL,ZESTRIL) 20 MG tablet; Take 1 tablet (20 mg total) by mouth daily.  Dispense: 90 tablet; Refill: 1    2. Encounter for tobacco use cessation counseling  Encouraged cessation.  Discussed options.  Prescription provided for Chantix.  Discussed common side effects, including risk of emotional changes and suicidal ideation, to notify immediately should this occur.  Notify with difficulties, otherwise we will follow-up at next visit.  - varenicline (CHANTIX STARTING MONTH BOX) 0.5 mg (11)- 1 mg (42) tablet; 1 wk before you stop smoking take 0.5mg daily on days 1-3, 0.5mg 2 times each day on days 4-7, then 1mg 2 times daily.  Dispense: 53 tablet; Refill: 0  - varenicline (CHANTIX CONTINUING MONTH BOX) 1 mg tablet; Take 1 tablet (1 mg total) by mouth 2 (two) times a day.  Dispense: 60 tablet; Refill: 1    3. Screen for colon cancer  Order placed for screening Cologuard after discussing colon cancer screening options.    4. Gout  Continue allopurinol at current dose.    5. Vitamin D deficiency  Encourage initiation of vitamin D 5000 units daily once high-dose vitamin D is completed.      There are no Patient Instructions on file for this visit.     Return in about 6 months (around 9/15/2021) for Annual Preventive Care Visit.      Subjective:      Arthur Lawson is a 55 y.o. male presented to clinic today for follow-up of hypertension.  At last visit his lisinopril dose was increased, this was in September.  He has a home blood pressure cuff, notes his blood pressures are typically in the 1 20-1 40s over 80s to 90s, overall in normal range.  Denies lightheadedness, dizziness, headaches, chest  pain, dyspnea, or swelling. Remains on allopurinol and management of gout.  Right hip pain, and is planning to see an orthopedic specialist and management of this.  Noted low vitamin D, is taking high-dose prescription.  Is feeling less nausea and lightheadedness with initiation of this.  Working to increase his exercise regularly with walking and some weights and core exercises.  Has lost about 20 pounds with doing so.  Is been eating at home more, establishing a more healthy routine, and eliminated soda, where as he had previously been drinking 6 to 8 cans/day.  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  Chewing tobacco, about 1 tin per day.  Interested in quitting.  Does not tolerate the nicotine replacement lozenges, also notes it is difficult to follow the instructions of not having anything to eat or drink before after.  Interested in other options.  He is agreeable to colon cancer screening.    Current Outpatient Medications   Medication Sig Dispense Refill     allopurinoL (ZYLOPRIM) 300 MG tablet TAKE 1 TABLET BY MOUTH EVERY DAY 90 tablet 4     cholecalciferol, vitamin D3, 50,000 unit capsule Take one cap by mouth TWICE WEEKLY for 8 weeks 16 capsule 0     ibuprofen (ADVIL,MOTRIN) 200 MG tablet Take 200 mg by mouth every 6 (six) hours as needed for pain.       lisinopriL (PRINIVIL,ZESTRIL) 20 MG tablet Take 1 tablet (20 mg total) by mouth daily. 90 tablet 1     varenicline (CHANTIX CONTINUING MONTH BOX) 1 mg tablet Take 1 tablet (1 mg total) by mouth 2 (two) times a day. 60 tablet 1     varenicline (CHANTIX STARTING MONTH BOX) 0.5 mg (11)- 1 mg (42) tablet 1 wk before you stop smoking take 0.5mg daily on days 1-3, 0.5mg 2 times each day on days 4-7, then 1mg 2 times daily. 53 tablet 0     No current facility-administered medications for this visit.        Past Medical History, Family History, and Social History reviewed.  Past Medical History:    Diagnosis Date     Essential Hypertension     Created by Conversion      Gout     Created by Conversion      Normochromic, Normocytic Anemia     Created by Conversion      Tendonitis Of The Achilles Tendon     Created by Conversion      Past Surgical History:   Procedure Laterality Date     FINGER SURGERY       INGUINAL HERNIA REPAIR       left knee arthroscopy       REFRACTIVE SURGERY       Hydrochlorothiazide, Penicillins, and Polycillin  Family History   Problem Relation Age of Onset     Colon cancer Father      Retinal detachment Father      Pacemaker Father      Retinal detachment Paternal Grandfather      Social History     Socioeconomic History     Marital status:      Spouse name: Nikkie     Number of children: Not on file     Years of education: Not on file     Highest education level: Not on file   Occupational History     Occupation:    Social Needs     Financial resource strain: Not on file     Food insecurity     Worry: Not on file     Inability: Not on file     Transportation needs     Medical: Not on file     Non-medical: Not on file   Tobacco Use     Smoking status: Never Smoker     Smokeless tobacco: Current User     Tobacco comment: Chews.   Substance and Sexual Activity     Alcohol use: Yes     Frequency: 2-4 times a month     Comment: Socially     Drug use: No     Sexual activity: Not on file   Lifestyle     Physical activity     Days per week: Not on file     Minutes per session: Not on file     Stress: Not on file   Relationships     Social connections     Talks on phone: Not on file     Gets together: Not on file     Attends Yazidi service: Not on file     Active member of club or organization: Not on file     Attends meetings of clubs or organizations: Not on file     Relationship status: Not on file     Intimate partner violence     Fear of current or ex partner: Not on file     Emotionally abused: Not on file     Physically abused: Not on file     Forced sexual activity: Not on  "file   Other Topics Concern     Not on file   Social History Narrative     Not on file         Review of systems is as stated in HPI, and the remainder of the 10 system review is otherwise negative.    Objective:     Vitals:    03/15/21 1723 03/15/21 1728   BP: (!) 158/94 140/88   Pulse: (!) 48    Resp: 20    Temp: 97.9  F (36.6  C)    TempSrc: Oral    SpO2: 99%    Weight: 196 lb (88.9 kg)    Height: 5' 11\" (1.803 m)     Body mass index is 27.34 kg/m .    Alert male.  Mucous membranes moist.  Heart with regular rate and rhythm.  Lungs are clear.  Extremities without edema.      This note has been dictated using voice recognition software. Any grammatical or context distortions are unintentional and inherent to the the software.   "

## 2021-06-17 NOTE — TELEPHONE ENCOUNTER
RN cannot approve Refill Request    RN can NOT refill this medication med is not covered by policy/route to provider. Last office visit: 3/15/2021 Jessica Jones MD Last Physical: 8/5/2019 Last MTM visit: Visit date not found Last visit same specialty: 3/15/2021 Jessica Jones MD.  Next visit within 3 mo: Visit date not found  Next physical within 3 mo: Visit date not found      Myrtle Packer, [facility] Triage/Med Refill 5/10/2021    Requested Prescriptions   Pending Prescriptions Disp Refills     cholecalciferol, vitamin D3, 1,250 mcg (50,000 unit) capsule 16 capsule 0     Sig: Take one cap by mouth TWICE WEEKLY for 8 weeks       There is no refill protocol information for this order

## 2021-06-17 NOTE — TELEPHONE ENCOUNTER
RN cannot approve Refill Request    RN can NOT refill this medication med is not covered by policy/route to provider. Last office visit: 3/15/2021 Jessica Jones MD Last Physical: 8/5/2019 Last MTM visit: Visit date not found Last visit same specialty: 3/15/2021 Jessica Jones MD.  Next visit within 3 mo: Visit date not found  Next physical within 3 mo: Visit date not found      Ruthie Waggoner, [facility] Triage/Med Refill 5/13/2021    Requested Prescriptions   Pending Prescriptions Disp Refills     cholecalciferol, vitamin D3, 1,250 mcg (50,000 unit) capsule 16 capsule 0     Sig: Take one cap by mouth TWICE WEEKLY for 8 weeks       There is no refill protocol information for this order

## 2021-06-17 NOTE — TELEPHONE ENCOUNTER
Unable to reach pt. I left a vm. If pt returns call, please relay Dr. Jones message, she is unable to refill the high dose of vitamin D until pt comes in for a Lab only appt. Dr. Jones would like to check pt's Vitamin D levels. Will have Dr. Jones order & sign for Vitamin D.

## 2021-06-19 NOTE — PROGRESS NOTES
Assessment/Plan:     1. RUQ abdominal pain  We discussed broad differential diagnosis.  Cholelithiasis remains in the differential though much less likely based on normal CT imaging results.  Also less likely to be renal stone as there is no suggestion of this on CT scan, however with technique cannot rule this out.  If present, it likely would be small and likely to pass on its own.  Gastropathy and consideration, I recommend avoidance of NSAIDs and initiation of omeprazole daily along with gastritis diet.  Reassured that no evidence of pancreatitis, diverticulitis, appendicitis, etc.  Notify me how things are going.  - Uric Acid    2. Gout  Will obtain uric acid level as recommended by rheumatologist.    3. Essential hypertension  Overall doing well on current regimen.  Recent basic metabolic panel without concerning findings.  Continue.  Encouraged healthy lifestyle habits.    4.  Lesions of iliac crest bilaterally on CT imaging  Nonspecific per radiology.  Will assess further with MRI of the pelvis.      Subjective:      Arthur Lawson is a 52 y.o. male presented clinic today for follow-up evaluation of right upper quadrant pain radiating toward his right lower quadrant for the past 5 days or so, constant in nature but also waxing and waning in intensity without any identifiable pattern.  Eating does not make it worse or better, seen with bowel movement.  Bowel movements occurring regularly.  Describes the pain as being firm, heavy, tender, and somewhat bloated.  Describes it is also like a runners ache.  It does not seem positional.  No associated nausea.  Seen in walk-in clinic yesterday, noted to have a normal hemogram with slight anemia of 13.6, normal basic metabolic panel, LFTs normal with the exception of mildly elevated total bilirubin at 1.6, slightly elevated calcium of 10.9.  Lipase was normal.  CT scan was performed of the abdomen and pelvis today without any concerning findings.  There is an  incidental lesions seen in the iliac bones bilaterally require further characterization.    Working with rheumatologist through [facility] in regards to gout and started on allopurinol for the past 9 months, doing well, due for follow-up uric acid level.    Remains on lisinopril 5 mg daily in management of hypertension.  Denies any ongoing lightheadedness or dizziness.  He does not check his blood pressures.  He has been able to wean down from higher doses in the past.  Intensity he experienced previous resolved after about 3-4 months.  No ongoing symptoms.  Etiology still not apparent.  We discussed that if it were to return I would consider evaluation at [facility].    Current Outpatient Prescriptions   Medication Sig Dispense Refill     allopurinol (ZYLOPRIM) 100 MG tablet Take 100 mg by mouth 2 (two) times a day.  0     aspirin 81 MG EC tablet Take 81 mg by mouth daily.       lisinopril (PRINIVIL,ZESTRIL) 5 MG tablet TAKE ONE TABLET BY MOUTH ONCE DAILY 90 tablet 0     No current facility-administered medications for this visit.        Past Medical History, Family History, and Social History reviewed.  Past Medical History:   Diagnosis Date     Essential Hypertension     Created by Conversion      Gout     Created by Conversion      Normochromic, Normocytic Anemia     Created by Conversion      Tendonitis Of The Achilles Tendon     Created by Conversion      No past surgical history on file.  Hydrochlorothiazide; Penicillins; and Polycillin  No family history on file.  Social History     Social History     Marital status:      Spouse name: N/A     Number of children: N/A     Years of education: N/A     Occupational History     Not on file.     Social History Main Topics     Smoking status: Never Smoker     Smokeless tobacco: Current User      Comment: Chews.     Alcohol use Yes      Comment: Socially.     Drug use: No     Sexual activity: Not on file     Other Topics Concern     Not on file  "    Social History Narrative         Review of systems is as stated in HPI, and the remainder of the 10 system review is otherwise negative.    Objective:     Vitals:    07/03/18 1134   BP: 112/70   Patient Site: Left Arm   Patient Position: Sitting   Cuff Size: Adult Large   Pulse: (!) 58   Resp: 20   Temp: 98.1  F (36.7  C)   TempSrc: Oral   SpO2: 99%   Weight: 209 lb 4.8 oz (94.9 kg)   Height: 5' 11\" (1.803 m)    Body mass index is 29.19 kg/(m^2).    Alert male in no acute distress.  Ambulates without difficulty.  Pupils are equal, round, and reactive to light.  Mucous members moist.  Heart with regular rate and rhythm.  Lungs clear.  No CVA tenderness.  Abdomen is soft.  Mild tenderness palpation right upper quadrant and right mid abdomen region without any palpable masses or abnormalities.  Jaramillo's sign negative.  Extremities without edema or rashes.      This note has been dictated using voice recognition software. Any grammatical or context distortions are unintentional and inherent to the the software.   "

## 2021-06-19 NOTE — PROGRESS NOTES
ASSESSMENT:   1. Right sided abdominal pain  HM1(CBC and Differential)    Basic Metabolic Panel    Lipase    Hepatic Profile    CT Abdomen Pelvis With Oral With IV Contrast    HM1 (CBC with Diff)       PLAN:  52-year-old male presents for evaluation of 5 days of right upper abdominal pain.  Pain is been constant, cannot identify palliative or provocative factors.  Exam reveals a well-appearing nontoxic male, right upper quadrant and right lower quadrant tenderness to palpation.  No organomegaly is appreciated.  Patient was seen late in the day in urgent care last evening, labs were obtained and CT scheduled for early this morning.  Labs are essentially unremarkable, no leukocytosis, no significant anemia.  No electrolyte derangement.  Lipase is normal, normal renal function.  Hepatic panel is essentially normal with the exception of very mildly elevated total bili which he has had in the past.  CT of the abdomen and pelvis with oral and IV contrast is obtained this morning, no findings to account for the discomfort patient is having on this study.  Low likelihood for cholecystitis is no fevers and no leukocytosis, gallbladder did appear normal on CT as well, however this is not the ideal study to evaluate the gallbladder.  No transaminitis to suggest biliary colic.  Appendix appears normal on CT. No evidence of diverticulitis on CT.  Doubt pancreatitis as lipase is normal and normal visualization on CT.  At this point, I am unsure why the patient is experiencing this pain.  Of note, there is an incidental finding on CT of sclerotic/lytic foci in the iliac bones.  I did contact the patient by phone and made him aware of this, unclear the significance of this at this point.  Radiology did recommend follow-up MRI.  I did contact the patient by phone and discussed these findings with him.  He is placed on his primary care provider schedule at 10:50 AM this morning for further evaluation of his abdominal pain as well as  this incidental CT finding.    I discussed red flag symptoms, return precautions to clinic/ER and follow up care with patient/parent.  Expected clinical course, symptomatic cares advised. Questions answered. Patient/parent amenable with plan.    Patient Instructions:  Patient Instructions   We will get blood work tonight, and I will call you with results in the morning.  We will get you set up for CT in the morning and once I have that result I will call you for further recommendations.  If you get worse overnight, get a fever, please go to the ER.      SUBJECTIVE:   Arthur Lawson is a 52 y.o. male who presents today with RUQ pain for the past 4-5 days.  Pain is fairly constant, worsening over the past few days.  Pain does not seem to be affected by eating.  Sometimes movement will worsen pain. No known injury.  No previous abdominal surgeries.  No fevers, vomiting, diarrhea, constipation, melanous or bloody stools.  Denies shortness of breath, cough, hemoptysis, chest pain, CHAPPELL.  Denies urinary frequency, hematuria, dysuria.  Has had diverticulitis in the past on right side, but it was a long time ago.  Denies ETOH.        ROS:  Comprehensive 12 pt ROS completed, positives noted in HPI, otherwise negative.      Past Medical History:  Patient Active Problem List   Diagnosis     Gout     Normochromic, Normocytic Anemia     Essential Hypertension     Motion Sickness     Midback Pain     Acute bronchitis     Tendonitis Of The Achilles Tendon      Vitamin D deficiency       Surgical History:  No past surgical history on file.        Family History:  No family history on file.    Reviewed; Non-contributory    History   Smoking Status     Never Smoker   Smokeless Tobacco     Current User     Comment: Chews.           Current Medications:  Current Outpatient Prescriptions on File Prior to Visit   Medication Sig Dispense Refill     lisinopril (PRINIVIL,ZESTRIL) 5 MG tablet TAKE ONE TABLET BY MOUTH ONCE DAILY 90 tablet 0      aspirin 81 MG EC tablet Take 81 mg by mouth daily.       predniSONE (DELTASONE) 10 MG tablet TAKE 4 TABS ONCE DAILY FOR 3 DAYS, THEN 3 TABS DAILY FOR 2 DAYS, THEN 2 TABS DAILY FOR 2 DAYS AS DIRECTED. 22 tablet 0     predniSONE (DELTASONE) 10 MG tablet TAKE 4 TABS ONCE DAILY FOR 3 DAYS, THEN 3 TABS DAILY FOR 2 DAYS, THEN 2 TABS DAILY FOR 2 DAYS AS DIRECTED 22 tablet 0     predniSONE (DELTASONE) 10 MG tablet TAKE 4 TABS ONCE DAILY FOR 3 DAYS, THEN 3 TABS DAILY FOR 2 DAYS, THEN 2 TABS DAILY FOR 2 DAYS AS DIRECTED. 22 tablet 0     No current facility-administered medications on file prior to visit.        Allergies:   Allergies   Allergen Reactions     Hydrochlorothiazide      Gout flair       Penicillins      Polycillin        OBJECTIVE:   Vitals:    07/02/18 1907   BP: 120/70   Patient Site: Right Arm   Patient Position: Sitting   Cuff Size: Adult Regular   Pulse: 63   Resp: 20   Temp: 98.2  F (36.8  C)   TempSrc: Oral   SpO2: 99%   Weight: 210 lb 8 oz (95.5 kg)     Physical exam reveals a pleasant 52 y.o. male.   Appears healthy, alert and cooperative. Non-toxic appearance.  Lungs: Chest is clear, no wheezing, rhonchi or rales. Symmetric air entry throughout both lung fields.  Heart: regular rate and rhythm, no murmur, rub or gallop  Abdomen: soft, RLQ and RUQ TTP, however mild. No peritoneal signs. No masses or organomegaly.  Negative Jaramillo sign.  Skin: pink, warm, dry, and without lesions on limited skin exam. No rashes.     RADIOLOGY    Ct Abdomen Pelvis With Oral With Iv Contrast    Result Date: 7/3/2018  CT ABDOMEN PELVIS W ORAL W IV CONTRAST 7/3/2018 7:33 AM     INDICATION: Diverticulitis TECHNIQUE: CT abdomen and pelvis. Multiplanar reformation images (MPR). Dose reduction techniques were used. IV CONTRAST: Iohexol (Omni) 100 mL COMPARISON: None. FINDINGS: LUNG BASES: Negative. ABDOMEN: Liver, gallbladder, bile ducts, pancreas, spleen, adrenals and kidneys are negative. PELVIS: Bowel unremarkable, nothing  inflammatory or obstructive. Appendix normal. Bladder and prostate within normal limits. MUSCULOSKELETAL: Degenerative changes at lumbosacral junction. Vague mixed lytic and sclerotic foci involving iliac bones, most noted on the left side, of uncertain etiology.     CONCLUSION: 1.  No etiology for current symptoms, there is no diverticulitis or inflammatory change involving bowel. 2.  Scattered vague mixed sclerotic/lytic foci within the iliac bones of uncertain etiology. Follow-up bone scan or pelvic MRI may be beneficial to evaluate further.      LABORATORY STUDIES    Recent Results (from the past 24 hour(s))   Basic Metabolic Panel   Result Value Ref Range    Sodium 138 136 - 145 mmol/L    Potassium 4.2 3.5 - 5.0 mmol/L    Chloride 103 98 - 107 mmol/L    CO2 25 22 - 31 mmol/L    Anion Gap, Calculation 10 5 - 18 mmol/L    Glucose 96 70 - 125 mg/dL    Calcium 10.9 (H) 8.5 - 10.5 mg/dL    BUN 7 (L) 8 - 22 mg/dL    Creatinine 0.79 0.70 - 1.30 mg/dL    GFR MDRD Af Amer >60 >60 mL/min/1.73m2    GFR MDRD Non Af Amer >60 >60 mL/min/1.73m2   Lipase   Result Value Ref Range    Lipase 34 0 - 52 U/L   Hepatic Profile   Result Value Ref Range    Bilirubin, Total 1.6 (H) 0.0 - 1.0 mg/dL    Bilirubin, Direct 0.4 <=0.5 mg/dL    Protein, Total 7.2 6.0 - 8.0 g/dL    Albumin 4.3 3.5 - 5.0 g/dL    Alkaline Phosphatase 95 45 - 120 U/L    AST 22 0 - 40 U/L    ALT 24 0 - 45 U/L   HM1 (CBC with Diff)   Result Value Ref Range    WBC 5.3 4.0 - 11.0 thou/uL    RBC 4.57 4.40 - 6.20 mill/uL    Hemoglobin 13.6 (L) 14.0 - 18.0 g/dL    Hematocrit 40.4 40.0 - 54.0 %    MCV 89 80 - 100 fL    MCH 29.7 27.0 - 34.0 pg    MCHC 33.5 32.0 - 36.0 g/dL    RDW 14.0 11.0 - 14.5 %    Platelets 233 140 - 440 thou/uL    MPV 7.0 7.0 - 10.0 fL    Neutrophils % 44 (L) 50 - 70 %    Lymphocytes % 44 (H) 20 - 40 %    Monocytes % 9 2 - 10 %    Eosinophils % 2 0 - 6 %    Basophils % 1 0 - 2 %    Neutrophils Absolute 2.4 2.0 - 7.7 thou/uL    Lymphocytes Absolute 2.3  0.8 - 4.4 thou/uL    Monocytes Absolute 0.5 0.0 - 0.9 thou/uL    Eosinophils Absolute 0.1 0.0 - 0.4 thou/uL    Basophils Absolute 0.1 0.0 - 0.2 thou/uL           Jenniffer Mota, CNP

## 2021-06-26 ENCOUNTER — HEALTH MAINTENANCE LETTER (OUTPATIENT)
Age: 56
End: 2021-06-26

## 2021-06-28 ENCOUNTER — RECORDS - HEALTHEAST (OUTPATIENT)
Dept: ADMINISTRATIVE | Facility: OTHER | Age: 56
End: 2021-06-28

## 2021-07-13 ENCOUNTER — OFFICE VISIT (OUTPATIENT)
Dept: FAMILY MEDICINE | Facility: CLINIC | Age: 56
End: 2021-07-13
Payer: COMMERCIAL

## 2021-07-13 VITALS
OXYGEN SATURATION: 97 % | DIASTOLIC BLOOD PRESSURE: 82 MMHG | SYSTOLIC BLOOD PRESSURE: 138 MMHG | WEIGHT: 199.2 LBS | BODY MASS INDEX: 27.78 KG/M2 | HEART RATE: 75 BPM

## 2021-07-13 DIAGNOSIS — R10.31 RIGHT INGUINAL PAIN: Primary | ICD-10-CM

## 2021-07-13 DIAGNOSIS — N50.811 RIGHT TESTICULAR PAIN: ICD-10-CM

## 2021-07-13 DIAGNOSIS — E55.9 VITAMIN D DEFICIENCY: ICD-10-CM

## 2021-07-13 PROCEDURE — 99214 OFFICE O/P EST MOD 30 MIN: CPT | Performed by: NURSE PRACTITIONER

## 2021-07-13 PROCEDURE — 82306 VITAMIN D 25 HYDROXY: CPT | Performed by: NURSE PRACTITIONER

## 2021-07-13 PROCEDURE — 36415 COLL VENOUS BLD VENIPUNCTURE: CPT | Performed by: NURSE PRACTITIONER

## 2021-07-13 RX ORDER — ALLOPURINOL 300 MG/1
300 TABLET ORAL DAILY
COMMUNITY
Start: 2020-12-14 | End: 2022-06-14

## 2021-07-13 RX ORDER — LISINOPRIL 20 MG/1
20 TABLET ORAL
COMMUNITY
Start: 2021-03-15 | End: 2021-09-13

## 2021-07-13 RX ORDER — ERGOCALCIFEROL 1.25 MG/1
CAPSULE ORAL
COMMUNITY
Start: 2021-02-23 | End: 2022-02-17

## 2021-07-13 NOTE — PROGRESS NOTES
Assessment and Plan:     Right inguinal pain  We will rule out right inguinal hernia.  Further plans pending the results.  Other differentials include right groin strain and lymphadenopathy.  Discussed avoidance of heavy lifting.  He is to take acetaminophen and ibuprofen as needed for pain.  - US Hernia Evaluation    Right testicular pain  Patient has been experiencing right testicular pain.  Will obtain ultrasound to rule out hydrocele, cystocele, and cyst.  - US Scrotum Artery and Vein    Vitamin D deficiency  He has a history of vitamin D deficiency.  Is taking 5000 units daily.  He is content with the plan.  - Vitamin D Deficiency        Subjective:     Arthur is a 55 year old male presenting to the clinic for multiple concerns today.  Patient presented to the [facility] on 6/28/2021 with concerns of right inguinal pain.  He has a history of a right inguinal hernia with repair in the past.  He was diagnosed with an abdominal wall strain.  Patient has noticed swelling in the area.  He complains of an intermittent ache which is exacerbated with movement.  Patient is also having right testicular pain.  He was noted to have a possible cyst within his testicle.  He denies nausea, vomiting, constipation, diarrhea, fever.  Patient also request of his vitamin D checked today.  He has history of vitamin D deficiency and is taking 5000 units daily.  He complains of fatigue.    Reviewof Systems: A complete 14 point review of systems was obtained and is negative or as stated in the history of present illness.    Social History     Socioeconomic History     Marital status:      Spouse name: Not on file     Number of children: Not on file     Years of education: Not on file     Highest education level: Not on file   Occupational History     Not on file   Tobacco Use     Smoking status: Never Smoker     Smokeless tobacco: Current User     Tobacco comment: Chews.   Substance and Sexual Activity     Alcohol use: Yes      Comment: Alcoholic Drinks/day: Socially     Drug use: No     Sexual activity: Not on file   Other Topics Concern     Not on file   Social History Narrative     Not on file     Social Determinants of Health     Financial Resource Strain:      Difficulty of Paying Living Expenses:    Food Insecurity:      Worried About Running Out of Food in the Last Year:      Ran Out of Food in the Last Year:    Transportation Needs:      Lack of Transportation (Medical):      Lack of Transportation (Non-Medical):    Physical Activity:      Days of Exercise per Week:      Minutes of Exercise per Session:    Stress:      Feeling of Stress :    Social Connections:      Frequency of Communication with Friends and Family:      Frequency of Social Gatherings with Friends and Family:      Attends Protestant Services:      Active Member of Clubs or Organizations:      Attends Club or Organization Meetings:      Marital Status:    Intimate Partner Violence:      Fear of Current or Ex-Partner:      Emotionally Abused:      Physically Abused:      Sexually Abused:        Active Ambulatory Problems     Diagnosis Date Noted     Essential hypertension 07/10/2003     Resolved Ambulatory Problems     Diagnosis Date Noted     No Resolved Ambulatory Problems     Past Medical History:   Diagnosis Date     Achilles bursitis or tendinitis      Anemia, unspecified      Gouty arthropathy, unspecified      Unspecified essential hypertension      Unspecified essential hypertension        Family History   Problem Relation Age of Onset     Colon Cancer Father      Retinal detachment Father      Pacemaker Father      Retinal detachment Paternal Grandfather        Objective:     /82   Pulse 75   Wt 90.4 kg (199 lb 3.2 oz)   SpO2 97%   BMI 27.78 kg/m      Patient is alert, in no obvious distress.   Skin: Warm, dry.    Lungs:  Clear to auscultation. Respirations even and unlabored.  No wheezing or rales noted.   Heart:  Regular rate and rhythm.  No  murmurs, S3, S4, gallops, or rubs.    Abdomen: Soft, nontender.  No organomegaly. Bowel sounds normoactive. No guarding or masses noted.   :  Patient has some mild swelling within his right upper groin region.  No discrete hernia is palpated. Testicles descended x 2.  He has a small questionable epididymal cyst within his right upper testicle.

## 2021-07-14 LAB — DEPRECATED CALCIDIOL+CALCIFEROL SERPL-MC: 20 UG/L (ref 30–80)

## 2021-09-13 DIAGNOSIS — I10 ESSENTIAL (PRIMARY) HYPERTENSION: ICD-10-CM

## 2021-09-13 RX ORDER — LISINOPRIL 20 MG/1
20 TABLET ORAL DAILY
Qty: 90 TABLET | Refills: 3 | Status: SHIPPED | OUTPATIENT
Start: 2021-09-13 | End: 2022-06-14

## 2021-09-13 NOTE — TELEPHONE ENCOUNTER
"  Disp Refills Start End KRISTEN    lisinopriL (PRINIVIL,ZESTRIL) 20 MG tablet 90 tablet 1 3/15/2021  No   Sig - Route: Take 1 tablet (20 mg total) by mouth daily. - Oral   Sent to pharmacy as: lisinopriL 20 mg tablet (PRINIVIL,ZESTRIL)   E-Prescribing Status: Receipt confirmed by pharmacy (3/15/2021  6:17 PM CDT)       Last Written Prescription Date:  3/15/21  Last Fill Quantity: 90,  # refills: 1   Last office visit provider:  7/13/21     Requested Prescriptions   Pending Prescriptions Disp Refills     lisinopril (ZESTRIL) 20 MG tablet [Pharmacy Med Name: LISINOPRIL 20 MG TABLET] 90 tablet 1     Sig: TAKE 1 TABLET BY MOUTH EVERY DAY       ACE Inhibitors (Including Combos) Protocol Passed - 9/13/2021 12:31 AM        Passed - Blood pressure under 140/90 in past 12 months     BP Readings from Last 3 Encounters:   07/13/21 138/82   03/15/21 (!) 140/88   09/15/20 (!) 168/98                 Passed - Recent (12 mo) or future (30 days) visit within the authorizing provider's specialty     Patient has had an office visit with the authorizing provider or a provider within the authorizing providers department within the previous 12 mos or has a future within next 30 days. See \"Patient Info\" tab in inbasket, or \"Choose Columns\" in Meds & Orders section of the refill encounter.              Passed - Medication is active on med list        Passed - Patient is age 18 or older        Passed - Normal serum creatinine on file in past 12 months     Recent Labs   Lab Test 09/15/20  1554   CR 0.89       Ok to refill medication if creatinine is low          Passed - Normal serum potassium on file in past 12 months     Recent Labs   Lab Test 09/15/20  1554   POTASSIUM 4.4                  Parvez Jacob RN 09/13/21 8:22 AM  "

## 2021-10-16 ENCOUNTER — HEALTH MAINTENANCE LETTER (OUTPATIENT)
Age: 56
End: 2021-10-16

## 2022-01-05 DIAGNOSIS — E55.9 VITAMIN D DEFICIENCY, UNSPECIFIED: ICD-10-CM

## 2022-01-07 RX ORDER — ERGOCALCIFEROL 1.25 MG/1
CAPSULE, LIQUID FILLED ORAL
Qty: 12 CAPSULE | Refills: 1 | OUTPATIENT
Start: 2022-01-07

## 2022-01-07 NOTE — TELEPHONE ENCOUNTER
I called patient to advise on the Vitamin D script and the need for a lab visit. Patient reports that he is seeing a new provider and will complete labs with them.

## 2022-01-07 NOTE — TELEPHONE ENCOUNTER
Outpatient Medication Detail     Disp Refills Start End KRISTEN   cholecalciferol, vitamin D3, 50,000 unit capsule 16 capsule 0 8/11/2019  --   Sig: Take one cap by mouth TWICE WEEKLY for 8 weeks   Sent to pharmacy as: cholecalciferol, vitamin D3, 50,000 unit capsule   E-Prescribing Status: Receipt confirmed by pharmacy (8/11/2019  4:42 PM CDT)       cholecalciferol, vitamin D3, 50,000 unit capsule [[id number]]    Electronically signed by: Jessica Jones MD on 08/11/19 1642 Status: Active   Ordering user: Jessica Jones MD 08/11/19 1642 Authorized by: Jessica Jones MD   Frequency:  08/11/19 - Until Discontinued   Diagnoses  Vitamin D deficiency [E55.9]     Routing refill request to provider for review/approval because:  Drug not on [place] [facility] [place]   A break in medication      Last office visit provider:  7/13/21     Requested Prescriptions   Pending Prescriptions Disp Refills     vitamin D2 (ERGOCALCIFEROL) 92072 units (1250 mcg) capsule [Pharmacy Med Name: VITAMIN D2 1.25MG(50,000 UNIT)] 12 capsule 1     Sig: TAKE 1 CAPSULE (50,000 UNITS) BY MOUTH ONCE A WEEK FOR 12 DOSES       There is no refill protocol information for this order          Parvez Jacob RN 01/07/22 9:25 AM

## 2022-02-17 ENCOUNTER — OFFICE VISIT (OUTPATIENT)
Dept: FAMILY MEDICINE | Facility: CLINIC | Age: 57
End: 2022-02-17
Payer: COMMERCIAL

## 2022-02-17 VITALS
DIASTOLIC BLOOD PRESSURE: 84 MMHG | HEART RATE: 76 BPM | WEIGHT: 229.1 LBS | SYSTOLIC BLOOD PRESSURE: 130 MMHG | BODY MASS INDEX: 31.95 KG/M2

## 2022-02-17 DIAGNOSIS — R10.13 EPIGASTRIC PAIN: Primary | ICD-10-CM

## 2022-02-17 DIAGNOSIS — R42 DIZZINESS: ICD-10-CM

## 2022-02-17 DIAGNOSIS — R11.0 NAUSEA: ICD-10-CM

## 2022-02-17 LAB
ALBUMIN SERPL-MCNC: 4.1 G/DL (ref 3.5–5)
ALP SERPL-CCNC: 97 U/L (ref 45–120)
ALT SERPL W P-5'-P-CCNC: 20 U/L (ref 0–45)
ANION GAP SERPL CALCULATED.3IONS-SCNC: 8 MMOL/L (ref 5–18)
AST SERPL W P-5'-P-CCNC: 18 U/L (ref 0–40)
BILIRUB SERPL-MCNC: 1.3 MG/DL (ref 0–1)
BUN SERPL-MCNC: 7 MG/DL (ref 8–22)
CALCIUM SERPL-MCNC: 10.5 MG/DL (ref 8.5–10.5)
CHLORIDE BLD-SCNC: 105 MMOL/L (ref 98–107)
CO2 SERPL-SCNC: 26 MMOL/L (ref 22–31)
CREAT SERPL-MCNC: 0.87 MG/DL (ref 0.7–1.3)
ERYTHROCYTE [DISTWIDTH] IN BLOOD BY AUTOMATED COUNT: 13.1 % (ref 10–15)
GFR SERPL CREATININE-BSD FRML MDRD: >90 ML/MIN/1.73M2
GLUCOSE BLD-MCNC: 96 MG/DL (ref 70–125)
HBA1C MFR BLD: 5.5 % (ref 0–5.6)
HCT VFR BLD AUTO: 40.5 % (ref 40–53)
HGB BLD-MCNC: 13.9 G/DL (ref 13.3–17.7)
MCH RBC QN AUTO: 30.8 PG (ref 26.5–33)
MCHC RBC AUTO-ENTMCNC: 34.3 G/DL (ref 31.5–36.5)
MCV RBC AUTO: 90 FL (ref 78–100)
PLATELET # BLD AUTO: 248 10E3/UL (ref 150–450)
POTASSIUM BLD-SCNC: 4.2 MMOL/L (ref 3.5–5)
PROT SERPL-MCNC: 6.8 G/DL (ref 6–8)
RBC # BLD AUTO: 4.52 10E6/UL (ref 4.4–5.9)
SODIUM SERPL-SCNC: 139 MMOL/L (ref 136–145)
TSH SERPL DL<=0.005 MIU/L-ACNC: 1.11 UIU/ML (ref 0.3–5)
WBC # BLD AUTO: 4.9 10E3/UL (ref 4–11)

## 2022-02-17 PROCEDURE — 85027 COMPLETE CBC AUTOMATED: CPT | Performed by: NURSE PRACTITIONER

## 2022-02-17 PROCEDURE — 99214 OFFICE O/P EST MOD 30 MIN: CPT | Performed by: NURSE PRACTITIONER

## 2022-02-17 PROCEDURE — 84443 ASSAY THYROID STIM HORMONE: CPT | Performed by: NURSE PRACTITIONER

## 2022-02-17 PROCEDURE — 80053 COMPREHEN METABOLIC PANEL: CPT | Performed by: NURSE PRACTITIONER

## 2022-02-17 PROCEDURE — 36415 COLL VENOUS BLD VENIPUNCTURE: CPT | Performed by: NURSE PRACTITIONER

## 2022-02-17 PROCEDURE — 82306 VITAMIN D 25 HYDROXY: CPT | Performed by: NURSE PRACTITIONER

## 2022-02-17 PROCEDURE — 83036 HEMOGLOBIN GLYCOSYLATED A1C: CPT | Performed by: NURSE PRACTITIONER

## 2022-02-17 NOTE — PATIENT INSTRUCTIONS
Continue Omeprazole daily.    Await labs.    May refer to [facility] depending on labs.

## 2022-02-17 NOTE — PROGRESS NOTES
"  Assessment & Plan     Epigastric pain  Highly suspect GERD. Discussed diet and lifestyle modifications. Recommend cutting down on soda, weight loss. Continue Omeprazole 20 mg once daily. Follow up in 4 weeks if symptoms worsen or do not improve.     Nausea  Suspect this is secondary to GERD.    Dizziness  This is a chronic issue. I suspect a degree of vertigo contributing to symptoms. Will check labs to rule out biologic cause. If normal, consider a referral to the [facility], as this has been chronic.   - CBC with platelets  - Vitamin D Deficiency  - TSH with free T4 reflex  - Hemoglobin A1c  - Comprehensive metabolic panel (BMP + Alb, Alk Phos, ALT, AST, Total. Bili, TP)         BMI:   Estimated body mass index is 31.95 kg/m  as calculated from the following:    Height as of 3/15/21: 1.803 m (5' 11\").    Weight as of this encounter: 103.9 kg (229 lb 1.6 oz).       Return in about 4 weeks (around 3/17/2022), or if symptoms worsen or fail to improve.    Kate Isidro NP  [facility]    Mariza Gibson is a 56 year old who presents with complaints of epigastric pain x1 month.  Associated symptoms include acid reflux, increased burping, and nausea.  Symptoms are worse in the morning.  Denies a cough, but does clear his throat frequently.  He did have a sore throat for some time.  Patient does drink a large amount of Pepsi.  He also eats a lot of sour ice breaker mints since he stopped chewing tobacco.  He cannot correlate his symptoms with any specific foods.  Patient had an upper endoscopy in 2015, which showed possible eosinophilic esophagitis.  I am unable to find any follow-up regarding this.  He started taking omeprazole 20 mg once daily just a couple of days ago.  He has not noticed any improvement yet.    Patient also complains of chronic dizziness/lightheadedness.  Usually has an episode about once per day.  He does describe this as a \"spinning\" sensation. "  Usually resolves with rest and time.  Denies any ear fullness, pain, or ringing.  He does not get any vomiting with these episodes.  [facility] month for the symptoms and had lab work completed.  The only abnormal was a low vitamin D.  He completed 12 weeks of high-dose therapy.  Does not appear that he has ever seen neurology.      Review of Systems   Pertinent items in HPI      Objective    /84   Pulse 76   Wt 103.9 kg (229 lb 1.6 oz)   BMI 31.95 kg/m    Body mass index is 31.95 kg/m .  Physical Exam   GENERAL: healthy, alert and no distress  EYES: Eyes grossly normal to inspection, PERRL and conjunctivae and sclerae normal. No nystagmus  HENT: ear canals and TM's normal, nose and mouth without ulcers or lesions  NECK: no adenopathy, no asymmetry, masses, or scars and thyroid normal to palpation  RESP: lungs clear to auscultation - no rales, rhonchi or wheezes  CV: regular rate and rhythm, normal S1 S2, no S3 or S4, no murmur, click or rub, no peripheral edema  ABDOMEN: soft, epigastric tenderness, no hepatosplenomegaly, no masses and bowel sounds normal

## 2022-02-18 LAB — DEPRECATED CALCIDIOL+CALCIFEROL SERPL-MC: 33 UG/L (ref 30–80)

## 2022-03-17 ENCOUNTER — OFFICE VISIT (OUTPATIENT)
Dept: FAMILY MEDICINE | Facility: CLINIC | Age: 57
End: 2022-03-17
Payer: COMMERCIAL

## 2022-03-17 VITALS
BODY MASS INDEX: 31.59 KG/M2 | DIASTOLIC BLOOD PRESSURE: 64 MMHG | WEIGHT: 226.5 LBS | HEART RATE: 74 BPM | SYSTOLIC BLOOD PRESSURE: 128 MMHG

## 2022-03-17 DIAGNOSIS — Z12.11 SCREEN FOR COLON CANCER: ICD-10-CM

## 2022-03-17 DIAGNOSIS — K21.00 GASTROESOPHAGEAL REFLUX DISEASE WITH ESOPHAGITIS WITHOUT HEMORRHAGE: Primary | ICD-10-CM

## 2022-03-17 PROBLEM — M10.9 GOUT: Status: ACTIVE | Noted: 2022-03-17

## 2022-03-17 PROCEDURE — 99213 OFFICE O/P EST LOW 20 MIN: CPT | Performed by: NURSE PRACTITIONER

## 2022-03-17 NOTE — PROGRESS NOTES
Assessment & Plan     Gastroesophageal reflux disease with esophagitis without hemorrhage  Symptoms not improving with Omeprazole.  I recommend an EGD to further assess his symptoms.  He is also due for a screening colonoscopy, so hopefully they can complete together.  I recommend that he continue his Omeprazole for now.  - Adult Gastro Ref - Procedure Only    Screen for colon cancer  - Adult Gastro Ref - Procedure Only        Return if symptoms worsen or fail to improve.    Kate Isidro NP  [facility]    Mariza Gibson is a 56 year old who presents for follow-up.  Patient was seen about a month ago with symptoms of epigastric pain, acid reflux, and nausea.  He had started and omeprazole.  After a month of taking, he really has not noticed any improvement.  In fact, he feels slightly worse.  Patient last had an EGD in 2015, which showed possible EOE.  He denies any new weight loss or vomiting.  However, he does state his appetite is not as good with the symptoms.    Of note, patient was also referred to a neurologist for chronic dizziness.  He does state that the dizziness seems to only really come with the GI symptoms.  He is going to wait on the neurologist for now.    I received a refill request for patient's allopurinol.  Patient tells me this is managed by his podiatrist.    Review of Systems   Pertinent items in HPI      Objective    /64 (BP Location: Right arm, Patient Position: Sitting, Cuff Size: Adult Large)   Pulse 74   Wt 102.7 kg (226 lb 8 oz)   BMI 31.59 kg/m    Body mass index is 31.59 kg/m .  Physical Exam   GENERAL: healthy, alert and no distress

## 2022-03-21 ENCOUNTER — MYC MEDICAL ADVICE (OUTPATIENT)
Dept: FAMILY MEDICINE | Facility: CLINIC | Age: 57
End: 2022-03-21
Payer: COMMERCIAL

## 2022-03-21 DIAGNOSIS — R11.0 NAUSEA: Primary | ICD-10-CM

## 2022-03-22 RX ORDER — ONDANSETRON 4 MG/1
4 TABLET, FILM COATED ORAL EVERY 8 HOURS PRN
Qty: 30 TABLET | Refills: 1 | Status: SHIPPED | OUTPATIENT
Start: 2022-03-22 | End: 2023-04-18

## 2022-05-11 ENCOUNTER — TELEPHONE (OUTPATIENT)
Dept: FAMILY MEDICINE | Facility: CLINIC | Age: 57
End: 2022-05-11
Payer: COMMERCIAL

## 2022-05-11 NOTE — TELEPHONE ENCOUNTER
Please call.    We did not discuss his blood pressure or medication at either of his office visits.  Looks like he was most recently prescribed by a provider at [facility].  He will need to go through them for refills.    Kate Isidro NP

## 2022-05-11 NOTE — TELEPHONE ENCOUNTER
Reason for Call:  Medication or medication refill: lisinopril (ZESTRIL) 20 MG tablet    Do you use a [facility]? NO  Name of the pharmacy and phone number for the current request: [facility] [employer] [address] in [place], [phone]    Name of the medication requested:     lisinopril (ZESTRIL) 20 MG tablet 90 tablet 3 9/13/2021  No   Sig - Route: Take 1 tablet (20 mg) by mouth daily - Oral   Patient taking differently: Take 30 mg by mouth daily             Other request: Pt states he takes 30 Mg. Pt would like Rx sent for 30 Mg. Pt had OV with Kate Sanna on 03/17/2022 and thought she would send a Rx for him but the pharmacy doesn't have it.    Can we leave a detailed message on this number? YES    Phone number patient can be reached at: Home number on file [phone] (home)    Best Time: any    Call taken on 5/11/2022 at 1:35 PM by Caroline Watson

## 2022-05-12 ENCOUNTER — TRANSFERRED RECORDS (OUTPATIENT)
Dept: HEALTH INFORMATION MANAGEMENT | Facility: CLINIC | Age: 57
End: 2022-05-12
Payer: COMMERCIAL

## 2022-05-13 ENCOUNTER — MYC MEDICAL ADVICE (OUTPATIENT)
Dept: FAMILY MEDICINE | Facility: CLINIC | Age: 57
End: 2022-05-13
Payer: COMMERCIAL

## 2022-06-14 ENCOUNTER — OFFICE VISIT (OUTPATIENT)
Dept: FAMILY MEDICINE | Facility: CLINIC | Age: 57
End: 2022-06-14
Payer: COMMERCIAL

## 2022-06-14 VITALS
SYSTOLIC BLOOD PRESSURE: 154 MMHG | BODY MASS INDEX: 31.66 KG/M2 | DIASTOLIC BLOOD PRESSURE: 108 MMHG | WEIGHT: 227 LBS | HEART RATE: 70 BPM

## 2022-06-14 DIAGNOSIS — I10 ESSENTIAL (PRIMARY) HYPERTENSION: ICD-10-CM

## 2022-06-14 PROBLEM — E66.3 OVERWEIGHT AND OBESITY: Status: ACTIVE | Noted: 2022-06-14

## 2022-06-14 PROBLEM — E66.9 OVERWEIGHT AND OBESITY: Status: ACTIVE | Noted: 2022-06-14

## 2022-06-14 PROCEDURE — 0054A COVID-19,PF,PFIZER (12+ YRS): CPT | Performed by: NURSE PRACTITIONER

## 2022-06-14 PROCEDURE — 99214 OFFICE O/P EST MOD 30 MIN: CPT | Mod: 25 | Performed by: NURSE PRACTITIONER

## 2022-06-14 PROCEDURE — 91305 COVID-19,PF,PFIZER (12+ YRS): CPT | Performed by: NURSE PRACTITIONER

## 2022-06-14 RX ORDER — LISINOPRIL 40 MG/1
40 TABLET ORAL DAILY
Qty: 90 TABLET | Refills: 3 | Status: SHIPPED | OUTPATIENT
Start: 2022-06-14 | End: 2023-04-18

## 2022-06-14 NOTE — PROGRESS NOTES
Assessment & Plan     Essential (primary) hypertension  Suboptimal control in clinic today.  Will increase lisinopril to 40 mg daily.  Recommend a nurse only blood pressure check in 1 month.  Patient declined to schedule this today, but will call to schedule.  - lisinopril (ZESTRIL) 40 MG tablet  Dispense: 90 tablet; Refill: 3        No follow-ups on file.    Kate Isidro NP  [facility]    Mariza Gibson is a 56 year old who presents for a blood pressure check.  Patient was previously being seen at [facility] for primary care.  He is currently taking lisinopril 20 mg daily for hypertension.  He has been on several different doses of this medication, most recently on 30 mg.  He does have a blood pressure monitor at home, but he does not check his blood pressures.  He generally tolerates the lisinopril well.    Patient continues to have episodes of dizziness and nausea.  He had a full work-up with gastroenterology, which was benign.  He is still having about 1 episode of week of significant dizziness and nausea that lasts about 12 hours.  He denies any generalized headaches or vision changes/loss.  I [facility]  Patient would like to get his blood pressure better controlled first.    History of Present Illness       Hypertension: He presents for follow up of hypertension.  He does not check blood pressure  regularly outside of the clinic. Outside blood pressures have been over 140/90. He does not follow a low salt diet.         Review of Systems   Pertinent items in HPI      Objective    BP (!) 154/108   Pulse 70   Wt 103 kg (227 lb)   BMI 31.66 kg/m    Body mass index is 31.66 kg/m .  Physical Exam   GENERAL: healthy, alert and no distress  RESP: lungs clear to auscultation - no rales, rhonchi or wheezes  CV: regular rate and rhythm, normal S1 S2, no S3 or S4, no murmur, click or rub, no peripheral edema

## 2022-10-01 ENCOUNTER — HEALTH MAINTENANCE LETTER (OUTPATIENT)
Age: 57
End: 2022-10-01

## 2023-02-05 ENCOUNTER — HEALTH MAINTENANCE LETTER (OUTPATIENT)
Age: 58
End: 2023-02-05

## 2023-04-18 ENCOUNTER — OFFICE VISIT (OUTPATIENT)
Dept: INTERNAL MEDICINE | Facility: CLINIC | Age: 58
End: 2023-04-18
Payer: COMMERCIAL

## 2023-04-18 ENCOUNTER — MYC MEDICAL ADVICE (OUTPATIENT)
Dept: INTERNAL MEDICINE | Facility: CLINIC | Age: 58
End: 2023-04-18

## 2023-04-18 VITALS
RESPIRATION RATE: 16 BRPM | WEIGHT: 226.4 LBS | HEIGHT: 71 IN | OXYGEN SATURATION: 98 % | SYSTOLIC BLOOD PRESSURE: 156 MMHG | HEART RATE: 57 BPM | TEMPERATURE: 97.7 F | DIASTOLIC BLOOD PRESSURE: 100 MMHG | BODY MASS INDEX: 31.69 KG/M2

## 2023-04-18 DIAGNOSIS — Z12.5 SCREENING PSA (PROSTATE SPECIFIC ANTIGEN): ICD-10-CM

## 2023-04-18 DIAGNOSIS — Z13.220 SCREENING FOR LIPOID DISORDERS: ICD-10-CM

## 2023-04-18 DIAGNOSIS — E78.5 HYPERLIPIDEMIA, UNSPECIFIED HYPERLIPIDEMIA TYPE: Primary | ICD-10-CM

## 2023-04-18 DIAGNOSIS — H81.03 MENIERE'S DISEASE, BILATERAL: Primary | ICD-10-CM

## 2023-04-18 DIAGNOSIS — Z13.1 SCREENING FOR DIABETES MELLITUS: ICD-10-CM

## 2023-04-18 DIAGNOSIS — I10 ESSENTIAL HYPERTENSION: ICD-10-CM

## 2023-04-18 DIAGNOSIS — R11.0 NAUSEA: ICD-10-CM

## 2023-04-18 DIAGNOSIS — I10 ESSENTIAL (PRIMARY) HYPERTENSION: ICD-10-CM

## 2023-04-18 DIAGNOSIS — Z86.39 HISTORY OF VITAMIN D DEFICIENCY: ICD-10-CM

## 2023-04-18 PROBLEM — E83.52 SERUM CALCIUM ELEVATED: Status: ACTIVE | Noted: 2023-04-18

## 2023-04-18 PROBLEM — E78.2 MIXED DYSLIPIDEMIA: Status: ACTIVE | Noted: 2023-04-18

## 2023-04-18 LAB
ALBUMIN SERPL BCG-MCNC: 4.5 G/DL (ref 3.5–5.2)
ALP SERPL-CCNC: 105 U/L (ref 40–129)
ALT SERPL W P-5'-P-CCNC: 32 U/L (ref 10–50)
ANION GAP SERPL CALCULATED.3IONS-SCNC: 11 MMOL/L (ref 7–15)
AST SERPL W P-5'-P-CCNC: 29 U/L (ref 10–50)
BILIRUB SERPL-MCNC: 1 MG/DL
BUN SERPL-MCNC: 6.6 MG/DL (ref 8–23)
CALCIUM SERPL-MCNC: 10.6 MG/DL (ref 8.6–10)
CHLORIDE SERPL-SCNC: 106 MMOL/L (ref 98–107)
CHOLEST SERPL-MCNC: 212 MG/DL
CREAT SERPL-MCNC: 0.96 MG/DL (ref 0.67–1.17)
DEPRECATED CALCIDIOL+CALCIFEROL SERPL-MC: 20 UG/L (ref 20–75)
DEPRECATED HCO3 PLAS-SCNC: 25 MMOL/L (ref 22–29)
ERYTHROCYTE [DISTWIDTH] IN BLOOD BY AUTOMATED COUNT: 13.3 % (ref 10–15)
GFR SERPL CREATININE-BSD FRML MDRD: >90 ML/MIN/1.73M2
GLUCOSE SERPL-MCNC: 84 MG/DL (ref 70–99)
HBA1C MFR BLD: 5.4 % (ref 0–5.6)
HCT VFR BLD AUTO: 39.2 % (ref 40–53)
HDLC SERPL-MCNC: 29 MG/DL
HGB BLD-MCNC: 13.1 G/DL (ref 13.3–17.7)
LDLC SERPL CALC-MCNC: 131 MG/DL
MCH RBC QN AUTO: 30.5 PG (ref 26.5–33)
MCHC RBC AUTO-ENTMCNC: 33.4 G/DL (ref 31.5–36.5)
MCV RBC AUTO: 91 FL (ref 78–100)
NONHDLC SERPL-MCNC: 183 MG/DL
PLATELET # BLD AUTO: 238 10E3/UL (ref 150–450)
POTASSIUM SERPL-SCNC: 4.2 MMOL/L (ref 3.4–5.3)
PROT SERPL-MCNC: 7.2 G/DL (ref 6.4–8.3)
PSA SERPL DL<=0.01 NG/ML-MCNC: 1.02 NG/ML (ref 0–3.5)
RBC # BLD AUTO: 4.3 10E6/UL (ref 4.4–5.9)
SODIUM SERPL-SCNC: 142 MMOL/L (ref 136–145)
TRIGL SERPL-MCNC: 261 MG/DL
TSH SERPL DL<=0.005 MIU/L-ACNC: 1.23 UIU/ML (ref 0.3–4.2)
WBC # BLD AUTO: 4 10E3/UL (ref 4–11)

## 2023-04-18 PROCEDURE — 82306 VITAMIN D 25 HYDROXY: CPT | Performed by: INTERNAL MEDICINE

## 2023-04-18 PROCEDURE — 80053 COMPREHEN METABOLIC PANEL: CPT | Performed by: INTERNAL MEDICINE

## 2023-04-18 PROCEDURE — 36415 COLL VENOUS BLD VENIPUNCTURE: CPT | Performed by: INTERNAL MEDICINE

## 2023-04-18 PROCEDURE — 84443 ASSAY THYROID STIM HORMONE: CPT | Performed by: INTERNAL MEDICINE

## 2023-04-18 PROCEDURE — G0103 PSA SCREENING: HCPCS | Performed by: INTERNAL MEDICINE

## 2023-04-18 PROCEDURE — 83036 HEMOGLOBIN GLYCOSYLATED A1C: CPT | Performed by: INTERNAL MEDICINE

## 2023-04-18 PROCEDURE — 80061 LIPID PANEL: CPT | Performed by: INTERNAL MEDICINE

## 2023-04-18 PROCEDURE — 99203 OFFICE O/P NEW LOW 30 MIN: CPT | Performed by: INTERNAL MEDICINE

## 2023-04-18 PROCEDURE — 85027 COMPLETE CBC AUTOMATED: CPT | Performed by: INTERNAL MEDICINE

## 2023-04-18 RX ORDER — HYDROCHLOROTHIAZIDE 12.5 MG/1
12.5 TABLET ORAL DAILY
Qty: 90 TABLET | Refills: 3 | Status: SHIPPED | OUTPATIENT
Start: 2023-04-18 | End: 2023-06-21

## 2023-04-18 RX ORDER — ONDANSETRON 4 MG/1
4 TABLET, FILM COATED ORAL EVERY 8 HOURS PRN
Qty: 30 TABLET | Refills: 1 | Status: SHIPPED | OUTPATIENT
Start: 2023-04-18 | End: 2023-06-21

## 2023-04-18 RX ORDER — LISINOPRIL 40 MG/1
40 TABLET ORAL DAILY
Qty: 90 TABLET | Refills: 3 | Status: SHIPPED | OUTPATIENT
Start: 2023-04-18 | End: 2024-05-20

## 2023-04-18 ASSESSMENT — PATIENT HEALTH QUESTIONNAIRE - PHQ9
SUM OF ALL RESPONSES TO PHQ QUESTIONS 1-9: 7
SUM OF ALL RESPONSES TO PHQ QUESTIONS 1-9: 7
10. IF YOU CHECKED OFF ANY PROBLEMS, HOW DIFFICULT HAVE THESE PROBLEMS MADE IT FOR YOU TO DO YOUR WORK, TAKE CARE OF THINGS AT HOME, OR GET ALONG WITH OTHER PEOPLE: SOMEWHAT DIFFICULT

## 2023-04-18 NOTE — PROGRESS NOTES
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    Arthur told me that for the past 8 years, the symptom would come and go, sometimes will be gone for a year or more, then will come back, with no clear trigger.  The symptoms have remained fairly consistent over the years, consisting of a sense of impaired balance, may be some sensation of movement (but not violent rotation), no change to his hearing, no visual distortion, no headaches.    He has not been in the habit of checking his blood pressure, and I suggested that he buy his own home blood pressure machine, because he does have hypertension and really should monitor his blood pressure.  I want him to record his vital signs when 1 of these episodes occurs.    He said that today April 18, 2023 he is experiencing a little bit of it even right now.  Is physical exam is nonfocal although I noted that he needed to hunt a bit when we did finger-to-nose testing.    He does have earwax occluding his right tympanic canal, and I want him to use over-the-counter wax dissolving eardrops to clear that out.    My leading hypotheses are that this is Ménière's disease, or a variant of benign paroxysmal positional vertigo (although he has not noticed head movements as a trigger).  Another possibility is that this  could be a migraine phenomenon, and if we were to investigate that possibility, I would send him for consultation in neurology.    I am going to add to his medication regimen hydrochlorothiazide 12.5 mg a day which can help with Ménière's disease    Also renew his prescription for ondansetron in case he gets nausea    Lets check some general metabolic parameters today.  He is fasting, so lets check his comprehensive metabolic panel, lipid profile, blood cell counts, TSH for thyroid, A1c test for diabetes, and screening PSA.  He does not go to doctors all that often, so today can be a routine checkup of sorts.    Earwax on the right, for which I recommended he use over-the-counter wax dissolving eardrop, example brand Debrox or Murine, use according to package directions    Essential hypertension, elevated blood pressure noted today April 18, 2023, with associated condition of obesity with body mass index 31.6  Arthur told me that he is indeed taking lisinopril 40 mg every day consistently.    We will get another blood pressure reading for he leaves the clinic today, but today's reading of 160/100 is not satisfactory control.    Target blood pressure is around 120/80 measured at rest in seated position on the right upper arm.    Arthur may need escalation of his blood pressure medication, and that means adding more medicine.  Next step would be to use hydrochlorothiazide 12.5 mg a day.  In fact I think I will go ahead and put him on that now, because sometimes hydrochlorothiazide can help with vertigo symptoms.    Beyond that, next step would be to add a calcium channel blocker such as amlodipine 5 mg a day.    Was able to successfully quit chewing tobacco, never did use the Chantix.    History of gout, no longer taking allopurinol, and he recalls that his last gout attack was approximately 5 years ago corresponding to around 2017    History of vitamin D deficiency, lets include a vitamin D level on today's blood  work    Adenomatous colon polyps underwent colonoscopy May 12, 2022 revealing 2 polyps size 5 and 7 mm, recheck recommended 5 years later which would be May 2027          --------------------------------------------------------------------------------------------------------------------------    Chief Complaint   Chief Complaint   Patient presents with     Dizziness     Dizziness-Nausea On and off 8 years-Recently came back             History of Present Illness  This 57 year old old     Dizziness (Dizziness-Nausea On and off 8 years-Recently came back )        4/18/2023     8:03 AM   Additional Questions   Roomed by Bri     History of Present Illness       Reason for visit:  Dizziness, light headed,nausea    He eats 0-1 servings of fruits and vegetables daily.He consumes 4 sweetened beverage(s) daily.He exercises with enough effort to increase his heart rate 9 or less minutes per day.  He exercises with enough effort to increase his heart rate 3 or less days per week. He is missing 7 dose(s) of medications per week.    Today's PHQ-9         PHQ-9 Total Score: 7    Wt Readings from Last 3 Encounters:   04/18/23 102.7 kg (226 lb 6.4 oz)   06/14/22 103 kg (227 lb)   03/17/22 102.7 kg (226 lb 8 oz)     BP Readings from Last 3 Encounters:   04/18/23 (!) 156/100   06/14/22 (!) 154/108   03/17/22 128/64       Lab Results   Component Value Date    WBC 4.0 04/18/2023    HGB 13.1 (L) 04/18/2023    HCT 39.2 (L) 04/18/2023     04/18/2023    CHOL 212 (H) 04/18/2023    TRIG 261 (H) 04/18/2023    HDL 29 (L) 04/18/2023    ALT 32 04/18/2023    AST 29 04/18/2023     04/18/2023    BUN 6.6 (L) 04/18/2023    CO2 25 04/18/2023    TSH 1.23 04/18/2023    PSA 1.02 04/18/2023         Review of Systems: A comprehensive review of systems was negative except as noted.  ---------------------------------------------------------------------------------------------------------------------------    Medications and Allergies   Current  Outpatient Medications   Medication Sig Dispense Refill     hydrochlorothiazide (HYDRODIURIL) 12.5 MG tablet Take 1 tablet (12.5 mg) by mouth daily 90 tablet 3     lisinopril (ZESTRIL) 40 MG tablet Take 1 tablet (40 mg) by mouth daily 90 tablet 3     omeprazole (PRILOSEC) 20 MG DR capsule Take 20 mg by mouth daily       ondansetron (ZOFRAN) 4 MG tablet Take 1 tablet (4 mg) by mouth every 8 hours as needed for nausea 30 tablet 1     Allergies   Allergen Reactions     Penicillins      policillin        Active Ambulatory Problems     Diagnosis Date Noted     Essential hypertension 07/10/2003     Gout 03/17/2022     Overweight and obesity 06/14/2022     Vitamin D deficiency 03/13/2015     Resolved Ambulatory Problems     Diagnosis Date Noted     No Resolved Ambulatory Problems     Past Medical History:   Diagnosis Date     Achilles bursitis or tendinitis      Anemia, unspecified      Gouty arthropathy, unspecified      Unspecified essential hypertension      Unspecified essential hypertension      Past Surgical History:   Procedure Laterality Date     FINGER SURGERY       INGUINAL HERNIA REPAIR       OTHER SURGICAL HISTORY      left knee arthroscopy     REFRACTIVE SURGERY       ZZC NONSPECIFIC PROCEDURE  1990    R inguinal herniorrhaphy      Past Medical History:   Diagnosis Date     Achilles bursitis or tendinitis     Created by Conversion      Anemia, unspecified     Created by Conversion      Gouty arthropathy, unspecified     Created by Conversion      Unspecified essential hypertension      Unspecified essential hypertension     Created by Conversion         Family and Social History   Family History   Problem Relation Age of Onset     Colon Cancer Father      Retinal detachment Father      Pacemaker Father      Retinal detachment Paternal Grandfather         Social History     Tobacco Use     Smoking status: Never     Smokeless tobacco: Former     Tobacco comments:     Chews.   Substance Use Topics     Alcohol  "use: Yes     Comment: Alcoholic Drinks/day: Socially     Drug use: No        Physical Exam   General Appearance:   *    BP (!) 156/100   Pulse 57   Temp 97.7  F (36.5  C)   Resp 16   Ht 1.803 m (5' 11\")   Wt 102.7 kg (226 lb 6.4 oz)   SpO2 98%   BMI 31.58 kg/m      Appears generally well  Note elevated blood pressure and elevated by his body mass index    General: Alert, in no distress  Skin: No significant lesion seen.  Eyes/nose/throat: Eyes without scleral icterus, eye movements normal, pupils equal and reactive, oropharynx clear  + Right tympanic canal occluded with wax  MSK: Neck with good ROM  Lymphatic: Neck without adenopathy or masses  Endocrine: Thyroid with no nodules to palpation  Pulm: Lungs clear to auscultation bilaterally  Cardiac: Heart with regular rate and rhythm, no murmur or gallop  GI: Abdomen soft, nontender.  MSK: Extremities no tenderness or edema  Neuro: Moves all extremities, without focal weakness  + Slight wavering with finger-to-nose testing  + Negative Romberg  Psych: Alert, normal mental status. Normal affect and speech         Additional Information   I spent 30 minutes on this encounter, including reviewing interval history since last visit, examining the patient, explaining and counseling the issues enumerated in the Assessment and Plan (patient given a copy), ordering indicated tests, ordering prescriptions       ROME RICH MD, MD  Internal Medicine    "

## 2023-04-18 NOTE — PATIENT INSTRUCTIONS
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    Arthur told me that for the past 8 years, the symptom would come and go, sometimes will be gone for a year or more, then will come back, with no clear trigger.  The symptoms have remained fairly consistent over the years, consisting of a sense of impaired balance, may be some sensation of movement (but not violent rotation), no change to his hearing, no visual distortion, no headaches.    He has not been in the habit of checking his blood pressure, and I suggested that he buy his own home blood pressure machine, because he does have hypertension and really should monitor his blood pressure.  I want him to record his vital signs when 1 of these episodes occurs.    He said that today April 18, 2023 he is experiencing a little bit of it even right now.  Is physical exam is nonfocal although I noted that he needed to hunt a bit when we did finger-to-nose testing.    He does have earwax occluding his right tympanic canal, and I want him to use over-the-counter wax dissolving eardrops to clear that out.    My leading hypotheses are that this is Ménière's disease, or a variant of benign paroxysmal positional vertigo (although he has not noticed head movements as a trigger).  Another possibility is that this could be a migraine phenomenon, and if we were to investigate that possibility, I would send him for consultation in neurology.    I am going to add to his  medication regimen hydrochlorothiazide 12.5 mg a day which can help with Ménière's disease    Also renew his prescription for ondansetron in case he gets nausea    Lets check some general metabolic parameters today.  He is fasting, so lets check his comprehensive metabolic panel, lipid profile, blood cell counts, TSH for thyroid, A1c test for diabetes, and screening PSA.  He does not go to doctors all that often, so today can be a routine checkup of sorts.    Earwax on the right, for which I recommended he use over-the-counter wax dissolving eardrop, example brand Debrox or Murine, use according to package directions    Essential hypertension, elevated blood pressure noted today April 18, 2023, with associated condition of obesity with body mass index 31.6  Arthur told me that he is indeed taking lisinopril 40 mg every day consistently.    We will get another blood pressure reading for he leaves the clinic today, but today's reading of 160/100 is not satisfactory control.    Target blood pressure is around 120/80 measured at rest in seated position on the right upper arm.    Arthur may need escalation of his blood pressure medication, and that means adding more medicine.  Next step would be to use hydrochlorothiazide 12.5 mg a day.  In fact I think I will go ahead and put him on that now, because sometimes hydrochlorothiazide can help with vertigo symptoms.    Beyond that, next step would be to add a calcium channel blocker such as amlodipine 5 mg a day.    Was able to successfully quit chewing tobacco, never did use the Chantix.    History of gout, no longer taking allopurinol, and he recalls that his last gout attack was approximately 5 years ago corresponding to around 2017    History of vitamin D deficiency, lets include a vitamin D level on today's blood work    Adenomatous colon polyps underwent colonoscopy May 12, 2022 revealing 2 polyps size 5 and 7 mm, recheck recommended 5 years later which would be May  2027

## 2023-04-24 RX ORDER — ATORVASTATIN CALCIUM 20 MG/1
20 TABLET, FILM COATED ORAL DAILY
Qty: 90 TABLET | Refills: 3 | Status: SHIPPED | OUTPATIENT
Start: 2023-04-24 | End: 2023-06-21

## 2023-04-30 ENCOUNTER — MYC MEDICAL ADVICE (OUTPATIENT)
Dept: INTERNAL MEDICINE | Facility: CLINIC | Age: 58
End: 2023-04-30
Payer: COMMERCIAL

## 2023-06-21 ENCOUNTER — TELEPHONE (OUTPATIENT)
Dept: FAMILY MEDICINE | Facility: OTHER | Age: 58
End: 2023-06-21

## 2023-06-21 ENCOUNTER — OFFICE VISIT (OUTPATIENT)
Dept: INTERNAL MEDICINE | Facility: CLINIC | Age: 58
End: 2023-06-21
Payer: COMMERCIAL

## 2023-06-21 VITALS
WEIGHT: 226 LBS | HEIGHT: 71 IN | TEMPERATURE: 98.3 F | SYSTOLIC BLOOD PRESSURE: 178 MMHG | HEART RATE: 62 BPM | DIASTOLIC BLOOD PRESSURE: 104 MMHG | BODY MASS INDEX: 31.64 KG/M2 | RESPIRATION RATE: 18 BRPM | OXYGEN SATURATION: 93 %

## 2023-06-21 DIAGNOSIS — R42 DIZZINESS: ICD-10-CM

## 2023-06-21 DIAGNOSIS — R11.0 NAUSEA: ICD-10-CM

## 2023-06-21 DIAGNOSIS — M70.61 TROCHANTERIC BURSITIS OF RIGHT HIP: ICD-10-CM

## 2023-06-21 DIAGNOSIS — E55.9 VITAMIN D DEFICIENCY: Primary | ICD-10-CM

## 2023-06-21 DIAGNOSIS — I10 ESSENTIAL HYPERTENSION: ICD-10-CM

## 2023-06-21 PROCEDURE — 99214 OFFICE O/P EST MOD 30 MIN: CPT | Performed by: NURSE PRACTITIONER

## 2023-06-21 RX ORDER — CHLORTHALIDONE 25 MG/1
25 TABLET ORAL DAILY
Qty: 90 TABLET | Refills: 1 | Status: SHIPPED | OUTPATIENT
Start: 2023-06-21 | End: 2023-07-11

## 2023-06-21 RX ORDER — ONDANSETRON 4 MG/1
TABLET, FILM COATED ORAL
Qty: 90 TABLET | Refills: 0 | Status: SHIPPED | OUTPATIENT
Start: 2023-06-21 | End: 2023-06-22

## 2023-06-21 ASSESSMENT — ENCOUNTER SYMPTOMS: HIP PAIN: 1

## 2023-06-21 NOTE — PATIENT INSTRUCTIONS
Start chlorthalidone 25 mg tablet daily.  I would take this in the morning so that it does not make you urinate at night.    Continue on lisinopril 40 mg.    Call the vestibular rehabilitation clinic, [facility].  We need to assess your dizziness and to figure out if it is treatable with therapy.  If not, we may need to send you to the neurology clinic.    Lets hold off on restarting the cholesterol medication right now.    Go to the orthopedic clinic and inquire about a steroid shot in your hip to treat possible trochanteric bursitis.    Follow-up with me in 2 weeks for blood pressure recheck.  Check your blood pressure every day and write these measurements down and bring them with you to that appointment.  Bring your blood pressure cuff so we can check its accuracy.

## 2023-06-21 NOTE — TELEPHONE ENCOUNTER
Medication Question or Refill    Contacts       Type Contact Phone/Fax    06/21/2023 07:26 AM CDT Phone (Incoming) Arthur Lawson (Self) [phone] (H)          What medication are you calling about (include dose and sig)?: ondansetron (ZOFRAN) 4 MG tablet    Preferred Pharmacy:  [place] [id number] [place] [address] [address]  Phone: [phone] Fax: [phone]      Controlled Substance Agreement on file:   CSA -- Patient Level:    CSA: None found at the patient level.       Who prescribed the medication?: Dr Harrison    Do you need a refill? Yes    When did you use the medication last? N/A    Patient offered an appointment? No    Do you have any questions or concerns?  No      Could we send this information to you in [facility] or would you prefer to receive a phone call?:   No preference   Okay to leave a detailed message?: No at Cell number on file:    Telephone Information:   Mobile [phone]

## 2023-06-21 NOTE — PROGRESS NOTES
Assessment & Plan     Vitamin D deficiency  In the past, his prior PCP had indicated a suspicion that his chronic dizziness was linked to his low vitamin D levels.  He did have his vitamin D levels checked at a prior appointment with my colleague, level came back at 20 and is not necessarily low.  Patient is not sure that his dizziness is associated to his low vitamin D and I am not sure that there is a correlation either    Trochanteric bursitis of right hip  He has pain over the greater trochanter on his right hip.  I suggested that he see [facility] for consideration of corticosteroid injection    Essential hypertension  He was tried on hydrochlorothiazide to see if this would also help his dizziness.  The patient did not think it made much of a difference.  I am going to have him try chlorthalidone and follow-up in 2 weeks for blood pressure recheck  - chlorthalidone (HYGROTON) 25 MG tablet; Take 1 tablet (25 mg) by mouth daily    Dizziness  I suspect inner ear problem but will have him see the vestibular rehab specialist for an assessment.  If she determines that this is not a treatable condition at the rehab clinic, we may need to refer to neurology.  He has been dizzy on and off for 9 years.  - Physical Therapy Referral; Future    Patient Instructions   Start chlorthalidone 25 mg tablet daily.  I would take this in the morning so that it does not make you urinate at night.    Continue on lisinopril 40 mg.    Call the vestibular rehabilitation clinic, [facility].  We need to assess your dizziness and to figure out if it is treatable with therapy.  If not, we may need to send you to the neurology clinic.    Lets hold off on restarting the cholesterol medication right now.    Go to the orthopedic clinic and inquire about a steroid shot in your hip to treat possible trochanteric bursitis.    Follow-up with me in 2 weeks for blood pressure recheck.  Check your blood pressure every day and write these  "measurements down and bring them with you to that appointment.  Bring your blood pressure cuff so we can check its accuracy.      Jai Garvin, CNP  [facility]    Mariza Gibson is a 57 year old, presenting for the following health issues:    Hip Pain (Chronic, worsening in the last 2 months. )        6/21/2023     6:53 AM   Additional Questions   Roomed by      Hip Pain    History of Present Illness       Reason for visit:  Hip pain.  Follow up in dizziness and nauseaHe consumes 4 sweetened beverage(s) daily.He exercises with enough effort to increase his heart rate 10 to 19 minutes per day.  He exercises with enough effort to increase his heart rate 3 or less days per week.   He is taking medications regularly.       Here for dizziness, right hip pain, and elevated blood pressure.  He also like to establish care.      Review of Systems   Constitutional, HEENT, cardiovascular, pulmonary, gi and gu systems are negative, except as otherwise noted.      Objective    BP (!) 178/104 (BP Location: Right arm, Patient Position: Sitting, Cuff Size: Adult Regular)   Pulse 62   Temp 98.3  F (36.8  C) (Oral)   Resp 18   Ht 1.803 m (5' 11\")   Wt 102.5 kg (226 lb)   SpO2 93%   BMI 31.52 kg/m    Body mass index is 31.52 kg/m .  Physical Exam     Patient is healthy-appearing and in no acute distress.      "

## 2023-06-22 DIAGNOSIS — R11.0 NAUSEA: ICD-10-CM

## 2023-06-22 RX ORDER — ONDANSETRON 4 MG/1
4 TABLET, FILM COATED ORAL EVERY 8 HOURS PRN
Qty: 90 TABLET | Refills: 0 | Status: SHIPPED | OUTPATIENT
Start: 2023-06-22 | End: 2024-04-22

## 2023-06-22 NOTE — TELEPHONE ENCOUNTER
"Last Written Prescription Date:  4/18/2023  Last Fill Quantity: 30,  # refills: 1   Last office visit provider:  6/21/2023     Requested Prescriptions   Pending Prescriptions Disp Refills     ondansetron (ZOFRAN) 4 MG tablet [Pharmacy Med Name: ONDANSETRON HCL 4 MG TABLET] 30 tablet 1     Sig: TAKE 1 TABLET BY MOUTH EVERY 8 HOURS AS NEEDED FOR NAUSEA        Antivertigo/Antiemetic Agents Passed - 6/21/2023  6:27 PM        Passed - Recent (12 mo) or future (30 days) visit within the authorizing provider's specialty     Patient has had an office visit with the authorizing provider or a provider within the authorizing providers department within the previous 12 mos or has a future within next 30 days. See \"Patient Info\" tab in inbasket, or \"Choose Columns\" in Meds & Orders section of the refill encounter.              Passed - Medication is active on med list        Passed - Patient is 18 years of age or older             Luna Burkett RN 06/21/23 8:21 PM  "

## 2023-07-02 ENCOUNTER — APPOINTMENT (OUTPATIENT)
Dept: MRI IMAGING | Facility: CLINIC | Age: 58
End: 2023-07-02
Attending: EMERGENCY MEDICINE
Payer: COMMERCIAL

## 2023-07-02 ENCOUNTER — HOSPITAL ENCOUNTER (EMERGENCY)
Facility: CLINIC | Age: 58
Discharge: HOME | End: 2023-07-02
Attending: EMERGENCY MEDICINE | Admitting: EMERGENCY MEDICINE
Payer: COMMERCIAL

## 2023-07-02 ENCOUNTER — APPOINTMENT (OUTPATIENT)
Dept: CT IMAGING | Facility: CLINIC | Age: 58
End: 2023-07-02
Attending: EMERGENCY MEDICINE
Payer: COMMERCIAL

## 2023-07-02 ENCOUNTER — OFFICE VISIT (OUTPATIENT)
Dept: FAMILY MEDICINE | Facility: CLINIC | Age: 58
End: 2023-07-02
Payer: COMMERCIAL

## 2023-07-02 VITALS
HEART RATE: 68 BPM | BODY MASS INDEX: 29.89 KG/M2 | SYSTOLIC BLOOD PRESSURE: 133 MMHG | RESPIRATION RATE: 16 BRPM | OXYGEN SATURATION: 95 % | WEIGHT: 214.3 LBS | DIASTOLIC BLOOD PRESSURE: 87 MMHG

## 2023-07-02 VITALS
BODY MASS INDEX: 30.1 KG/M2 | SYSTOLIC BLOOD PRESSURE: 119 MMHG | WEIGHT: 215 LBS | HEIGHT: 71 IN | HEART RATE: 50 BPM | RESPIRATION RATE: 20 BRPM | OXYGEN SATURATION: 100 % | TEMPERATURE: 97.6 F | DIASTOLIC BLOOD PRESSURE: 79 MMHG

## 2023-07-02 DIAGNOSIS — R61 DIAPHORESIS: ICD-10-CM

## 2023-07-02 DIAGNOSIS — N17.9 ACUTE KIDNEY INJURY (H): ICD-10-CM

## 2023-07-02 DIAGNOSIS — R42 VERTIGO: ICD-10-CM

## 2023-07-02 DIAGNOSIS — R42 DIZZINESS: Primary | ICD-10-CM

## 2023-07-02 DIAGNOSIS — I72.0 CAROTID ANEURYSM, LEFT (H): ICD-10-CM

## 2023-07-02 PROBLEM — M25.559 HIP PAIN: Status: ACTIVE | Noted: 2023-04-01

## 2023-07-02 PROBLEM — K63.5 POLYP OF COLON: Status: ACTIVE | Noted: 2022-05-12

## 2023-07-02 PROBLEM — R11.0 NAUSEA: Status: ACTIVE | Noted: 2022-05-12

## 2023-07-02 PROBLEM — D12.3 BENIGN NEOPLASM OF TRANSVERSE COLON: Status: ACTIVE | Noted: 2022-05-16

## 2023-07-02 PROBLEM — R10.13 EPIGASTRIC PAIN: Status: ACTIVE | Noted: 2022-05-12

## 2023-07-02 LAB
ANION GAP SERPL CALCULATED.3IONS-SCNC: 6 MMOL/L (ref 5–18)
ATRIAL RATE - MUSE: 60 BPM
ATRIAL RATE - MUSE: 66 BPM
BUN SERPL-MCNC: 23 MG/DL (ref 8–22)
CALCIUM SERPL-MCNC: 11.2 MG/DL (ref 8.5–10.5)
CHLORIDE BLD-SCNC: 97 MMOL/L (ref 98–107)
CO2 SERPL-SCNC: 31 MMOL/L (ref 22–31)
CREAT SERPL-MCNC: 1.68 MG/DL (ref 0.7–1.3)
DIASTOLIC BLOOD PRESSURE - MUSE: 85 MMHG
DIASTOLIC BLOOD PRESSURE - MUSE: NORMAL MMHG
GFR SERPL CREATININE-BSD FRML MDRD: 47 ML/MIN/1.73M2
GLUCOSE BLD-MCNC: 93 MG/DL (ref 70–125)
INTERPRETATION ECG - MUSE: NORMAL
INTERPRETATION ECG - MUSE: NORMAL
P AXIS - MUSE: 41 DEGREES
P AXIS - MUSE: 59 DEGREES
POTASSIUM BLD-SCNC: 4.2 MMOL/L (ref 3.5–5)
PR INTERVAL - MUSE: 200 MS
PR INTERVAL - MUSE: 224 MS
QRS DURATION - MUSE: 100 MS
QRS DURATION - MUSE: 102 MS
QT - MUSE: 420 MS
QT - MUSE: 422 MS
QTC - MUSE: 422 MS
QTC - MUSE: 440 MS
R AXIS - MUSE: -10 DEGREES
R AXIS - MUSE: -8 DEGREES
SODIUM SERPL-SCNC: 134 MMOL/L (ref 136–145)
SYSTOLIC BLOOD PRESSURE - MUSE: 126 MMHG
SYSTOLIC BLOOD PRESSURE - MUSE: NORMAL MMHG
T AXIS - MUSE: 18 DEGREES
T AXIS - MUSE: 21 DEGREES
VENTRICULAR RATE- MUSE: 60 BPM
VENTRICULAR RATE- MUSE: 66 BPM

## 2023-07-02 PROCEDURE — 93005 ELECTROCARDIOGRAM TRACING: CPT | Performed by: EMERGENCY MEDICINE

## 2023-07-02 PROCEDURE — 70498 CT ANGIOGRAPHY NECK: CPT

## 2023-07-02 PROCEDURE — 99285 EMERGENCY DEPT VISIT HI MDM: CPT | Mod: 25

## 2023-07-02 PROCEDURE — 80048 BASIC METABOLIC PNL TOTAL CA: CPT | Performed by: EMERGENCY MEDICINE

## 2023-07-02 PROCEDURE — 93005 ELECTROCARDIOGRAM TRACING: CPT | Performed by: NURSE PRACTITIONER

## 2023-07-02 PROCEDURE — A9585 GADOBUTROL INJECTION: HCPCS | Mod: JZ | Performed by: EMERGENCY MEDICINE

## 2023-07-02 PROCEDURE — 93010 ELECTROCARDIOGRAM REPORT: CPT | Performed by: INTERNAL MEDICINE

## 2023-07-02 PROCEDURE — 250N000011 HC RX IP 250 OP 636: Mod: JZ | Performed by: EMERGENCY MEDICINE

## 2023-07-02 PROCEDURE — 69209 REMOVE IMPACTED EAR WAX UNI: CPT | Mod: 59 | Performed by: NURSE PRACTITIONER

## 2023-07-02 PROCEDURE — 99215 OFFICE O/P EST HI 40 MIN: CPT | Mod: 25 | Performed by: NURSE PRACTITIONER

## 2023-07-02 PROCEDURE — 70496 CT ANGIOGRAPHY HEAD: CPT

## 2023-07-02 PROCEDURE — 258N000003 HC RX IP 258 OP 636: Performed by: EMERGENCY MEDICINE

## 2023-07-02 PROCEDURE — 250N000013 HC RX MED GY IP 250 OP 250 PS 637: Performed by: EMERGENCY MEDICINE

## 2023-07-02 PROCEDURE — 36415 COLL VENOUS BLD VENIPUNCTURE: CPT | Performed by: EMERGENCY MEDICINE

## 2023-07-02 PROCEDURE — 70553 MRI BRAIN STEM W/O & W/DYE: CPT

## 2023-07-02 PROCEDURE — 255N000002 HC RX 255 OP 636: Mod: JZ | Performed by: EMERGENCY MEDICINE

## 2023-07-02 RX ORDER — DIPHENHYDRAMINE HYDROCHLORIDE 50 MG/ML
25 INJECTION INTRAMUSCULAR; INTRAVENOUS ONCE
Status: COMPLETED | OUTPATIENT
Start: 2023-07-02 | End: 2023-07-02

## 2023-07-02 RX ORDER — DROPERIDOL 2.5 MG/ML
0.62 INJECTION, SOLUTION INTRAMUSCULAR; INTRAVENOUS ONCE
Status: COMPLETED | OUTPATIENT
Start: 2023-07-02 | End: 2023-07-02

## 2023-07-02 RX ORDER — LORAZEPAM 0.5 MG/1
0.5 TABLET ORAL EVERY 6 HOURS PRN
Qty: 8 TABLET | Refills: 0 | Status: SHIPPED | OUTPATIENT
Start: 2023-07-02 | End: 2023-10-17

## 2023-07-02 RX ORDER — MECLIZINE HYDROCHLORIDE 25 MG/1
25 TABLET ORAL 3 TIMES DAILY PRN
Qty: 20 TABLET | Refills: 0 | Status: SHIPPED | OUTPATIENT
Start: 2023-07-02 | End: 2023-07-02

## 2023-07-02 RX ORDER — LORAZEPAM 2 MG/ML
1 INJECTION INTRAMUSCULAR ONCE
Status: COMPLETED | OUTPATIENT
Start: 2023-07-02 | End: 2023-07-02

## 2023-07-02 RX ORDER — PROCHLORPERAZINE MALEATE 10 MG
5 TABLET ORAL EVERY 6 HOURS PRN
Qty: 12 TABLET | Refills: 0 | Status: SHIPPED | OUTPATIENT
Start: 2023-07-02 | End: 2023-08-29

## 2023-07-02 RX ORDER — MECLIZINE HYDROCHLORIDE 25 MG/1
25 TABLET ORAL 3 TIMES DAILY PRN
Qty: 20 TABLET | Refills: 0 | Status: SHIPPED | OUTPATIENT
Start: 2023-07-02 | End: 2023-08-29

## 2023-07-02 RX ORDER — GADOBUTROL 604.72 MG/ML
10 INJECTION INTRAVENOUS ONCE
Status: COMPLETED | OUTPATIENT
Start: 2023-07-02 | End: 2023-07-02

## 2023-07-02 RX ORDER — KETOROLAC TROMETHAMINE 15 MG/ML
15 INJECTION, SOLUTION INTRAMUSCULAR; INTRAVENOUS ONCE
Status: COMPLETED | OUTPATIENT
Start: 2023-07-02 | End: 2023-07-02

## 2023-07-02 RX ORDER — MECLIZINE HYDROCHLORIDE 25 MG/1
25 TABLET ORAL ONCE
Status: COMPLETED | OUTPATIENT
Start: 2023-07-02 | End: 2023-07-02

## 2023-07-02 RX ORDER — IOPAMIDOL 755 MG/ML
100 INJECTION, SOLUTION INTRAVASCULAR ONCE
Status: COMPLETED | OUTPATIENT
Start: 2023-07-02 | End: 2023-07-02

## 2023-07-02 RX ADMIN — MECLIZINE HYDROCHLORIDE 25 MG: 25 TABLET ORAL at 13:46

## 2023-07-02 RX ADMIN — DIPHENHYDRAMINE HYDROCHLORIDE 25 MG: 50 INJECTION, SOLUTION INTRAMUSCULAR; INTRAVENOUS at 15:07

## 2023-07-02 RX ADMIN — LORAZEPAM 1 MG: 2 INJECTION INTRAMUSCULAR; INTRAVENOUS at 14:35

## 2023-07-02 RX ADMIN — KETOROLAC TROMETHAMINE 15 MG: 15 INJECTION, SOLUTION INTRAMUSCULAR; INTRAVENOUS at 15:06

## 2023-07-02 RX ADMIN — GADOBUTROL 10 ML: 604.72 INJECTION INTRAVENOUS at 15:28

## 2023-07-02 RX ADMIN — DROPERIDOL 0.62 MG: 2.5 INJECTION, SOLUTION INTRAMUSCULAR; INTRAVENOUS at 14:10

## 2023-07-02 RX ADMIN — IOPAMIDOL 75 ML: 755 INJECTION, SOLUTION INTRAVENOUS at 16:50

## 2023-07-02 RX ADMIN — DROPERIDOL 0.62 MG: 2.5 INJECTION, SOLUTION INTRAMUSCULAR; INTRAVENOUS at 15:07

## 2023-07-02 RX ADMIN — SODIUM CHLORIDE 1000 ML: 9 INJECTION, SOLUTION INTRAVENOUS at 16:59

## 2023-07-02 ASSESSMENT — ENCOUNTER SYMPTOMS
HEADACHES: 1
RHINORRHEA: 0
NAUSEA: 1
SHORTNESS OF BREATH: 1
NAUSEA: 1
APPETITE CHANGE: 1
DIZZINESS: 1
FEVER: 0
CHILLS: 0
WEAKNESS: 0
VOMITING: 0

## 2023-07-02 ASSESSMENT — ACTIVITIES OF DAILY LIVING (ADL)
ADLS_ACUITY_SCORE: 35
ADLS_ACUITY_SCORE: 35

## 2023-07-02 NOTE — ED TRIAGE NOTES
Pt c/o dizziness and pressure behind his eyes for the last 5 days. He has a history of having dizziness off and on for the last few years. This dizziness feels different. Denies any changes to his vision. Says that the dizziness feels likes its hi head.     Triage Assessment     Row Name 07/02/23 1312       Triage Assessment (Adult)    Airway WDL WDL       Respiratory WDL    Respiratory WDL WDL       Skin Circulation/Temperature WDL    Skin Circulation/Temperature WDL WDL       Cardiac WDL    Cardiac WDL WDL       Peripheral/Neurovascular WDL    Peripheral Neurovascular WDL WDL       Cognitive/Neuro/Behavioral WDL    Cognitive/Neuro/Behavioral WDL WDL

## 2023-07-02 NOTE — ED PROVIDER NOTES
Progress Note    The patient was signed out to me by Dr Cannon. MRI pending.    Brief HPI: Arthur Lawson is a 57 year old male with a past medical history of hypertension, dyslipidemia, anemia, vitamin D deficiency, and obesity who presented with ongoing dizziness, nausea, headache, and unsteadiness for the past 5 days. Head pressure worse on the right side. He took Zofran at home PTA. Seen in clinic earlier today and sent to the ED for further evaluation.       Remaining Work-up:  CTA Head Neck with Contrast   Final Result   IMPRESSION:    HEAD CT:   1.  No acute intracranial hemorrhage, mass effect, or CT evidence for acute infarction.   2.  Mild global brain parenchymal volume loss with presumed sequelae of moderate chronic small vessel ischemic disease.      HEAD CTA:    1.  No significant stenosis, large vessel occlusion, or high flow AVM.   2.  2 x 2 mm posterolaterally projecting vascular outpouching arising from the mid cavernous segment of the left ICA, consistent with small aneurysm versus vascular operculum.       NECK CTA:   1.  No significant stenosis or dissection of the cervical vasculature by CTA.   2.  Mild focal atherosclerotic narrowing of the origin of the dominant left vertebral artery.      MR Brain w/o & w Contrast   Final Result   IMPRESSION:   1.  No acute intracranial process.   2.  Generalized brain atrophy and presumed microvascular ischemic changes as detailed above.            ED Course as of 07/02/23 2325   Sun Jul 02, 2023   1519 Signout: 56 yo with vertigo, headache. MRI attempt complicated by hip pain and nausea so far. Awaiting MRI results.   1614 MR Brain w/o & w Contrast  1.  No acute intracranial process.  2.  Generalized brain atrophy and presumed microvascular ischemic changes as detailed above.   1618 I met with the patient and reassessed. Updated patient on results.    1626 Pt remains symptomatic. I added on CTA head and neck to eval for dissection     Scan did not show  any evidence of vascular injury or significant stenosis.  There was a small left ICA aneurysm which I discussed with the patient, incidental finding.  Discharged with meclizine, as needed Ativan.  He has outpatient follow-up with similar neurology already set up in a few weeks.    Final diagnoses:   Vertigo   Acute kidney injury (H)   Carotid aneurysm, left (H)       I, Sharda Alcocer, am serving as a scribe to document services personally performed by Shaw Salinas M.D., based on my observations and the provider's statements to me.  IShaw M.D., attest that Sharda Alcocer is acting in a scribe capacity, has observed my performance of the services and has documented them in accordance with my direction.      Shaw Salinas MD  Emergency Medicine  [facility]       Shaw Salinas MD  07/02/23 8568

## 2023-07-02 NOTE — DISCHARGE INSTRUCTIONS
No driving until vertigo has stopped.Meclizine for  dizziness, and Ativan for breakthrough symptoms.      Call the vertigo clinic, let them know that the ER doctor recommended to be seen earlier if they have any possible openings.     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  This is not causing any symptoms, just happened to be found, and will need follow-up with your primary care doctor.  may require another imaging in a year or 2 to make sure that it is not changing in size.    Here is some information about the Epley maneuver. It may be useful to try this at home a few times over the next couple of days.  You can look up some images online if you are more visual learner.  If you are tech-saavy, then look up a video on youtube. Please try this maneuver for both your right and left side. If symptoms are gone, you can stop!    The meclizine at home will help with the dizziness and nausea.    You may find it helpful to watch a video of the home Epley maneuver first.     Follow these steps if the problem is with your right ear:  Start by sitting on a bed.  Turn your head 45 degrees to the right.lie back, keeping your head turned. Your shoulders should now be on the pillow, and your head should be reclined. Wait 30 seconds.  Turn your head 90 degrees to the left, without raising it. Your head will now belooking 45 degrees to the left. Wait another 30 seconds.  Turn your head and body another 90 degrees to the left, into the bed. Wait another 30 seconds.  Sit up on the left side.    Follow these steps if theproblem is with your left ear:  Start by sitting on a bed.  Turn your head 45 degrees to the left.  Quickly lie back, keeping your head turned. Your shoulders should now be on thepillow, and your head should be reclined. Wait 30 seconds.  Turn your head 90 degrees to the right, without raising it. Your head will now be looking 45 degrees to the right. Wait another 30  seconds.  Turn your headand body another 90 degrees to the right, into the bed. Wait another 30 seconds.  Sit up on the right side.

## 2023-07-02 NOTE — ED NOTES
Expected Patient Referral to ED  12:20 PM    Referring Clinic/Provider:  Walk-in clinic    Reason for referral/Clinical facts:  Vertigo  The patient is a 57-year-old male with a history of vertigo who presents with vertigo.  He says that this episode of vertigo has been going on for several days.  He has some associated nausea with it.  And he has noticed that he has had diaphoresis at work which is unusual for him.  The vertigo is associated with a headache as well.  He says this vertigo is different than his usual vertigo and that he still feels vertiginous even while keeping his head still.  He is being sent here likely to get an MRI to rule out a central cause of vertigo.  Also to control his vertigo and nausea symptoms.    Recommendations provided:  Send to ED for further evaluation    Caller was informed that this institution does possess the capabilities and/or resources to provide for patient and should be transferred to our facility.    Discussed that if direct admit is sought and any hurdles are encountered, this ED would be happy to see the patient and evaluate.    Informed caller that recommendations provided are recommendations based only on the facts provided and that they responsible to accept or reject the advice, or to seek a formal in person consultation as needed and that this ED will see/treat patient should they arrive.      Jaiden Cannon MD  [facility] EMERGENCY ROOM  [address] [id number] [phone]       Jaiden Cannon MD  07/02/23 1224

## 2023-07-02 NOTE — ED PROVIDER NOTES
Emergency Department Encounter      NAME: Arthur Lawson  AGE: 57 year old male  YOB: 1965  MRN: [id number]  EVALUATION DATE & TIME: 2023  1:16 PM    PCP: Jai Garvin    ED PROVIDER: Jaiden Cannon M.D.      Chief Complaint   Patient presents with     Dizziness     Headache         FINAL IMPRESSION:  1. Vertigo        MEDICAL DECISION MAKIN:22 PM I met with the patient, obtained history, performed an initial exam, and discussed options and plan for diagnostics and treatment here in the ED.     This patient is a 57-year-old male with a history of vitamin D deficiency, hypertension and dyslipidemia who presents with a headache and vertigo.  He says that he has had his vertigo for the past 5 days.  He says that this is different than his typical vertigo.  He has a headache with this episode and describes it as a pressure pain on the right side of his head.  He has tried Zofran for the nausea but says that it has not controlled his nausea.  He was seen initially at the walk-in clinic where they flushed his right ear because of ceruminosis.  He was referred to us for a MRI scan.  I have ordered the MRI as well as medication for him including meclizine, IV droperidol, Benadryl and Ativan.  He did initially try to do the MRI after the droperidol and meclizine but was unable to tolerate it.  He was brought back to the ER where I ordered the Benadryl Ativan and additional droperidol.  The patient was also complained of some chronic hip pain that he said made it difficult for the MRI to be done.  He is going to be given a dose of Toradol in the ER for his hip pain and to facilitate the MRI be because I want to avoid opiates in his case because of the IV benzodiazepine and other sedatives that he just received.  I will sign this patient out to Dr. Salinas with the MRI pending but I anticipate he will be able to be discharged home.    Pertinent Labs & Imaging studies reviewed. (See chart  for details)    The importance of close follow up was discussed. We reviewed warning signs and symptoms, and I instructed Mr. Lawson to return to the emergency department immediately if he develops any new or worsening symptoms. I provided additional verbal discharge instructions. Mr. Lawson expressed understanding and agreement with this plan of care, his questions were answered, and he was discharged in stable condition.     Medical Decision Making     History:    Supplemental history from: Documented in chart, if applicable    External Record(s) reviewed: Documented in chart, if applicable.     Work Up:    Chart documentation includes differential considered and any EKGs or imaging independently interpreted by provider, where specified.    In additional to work up documented, I considered the following work up: Documented in chart, if applicable.     External consultation:    Discussion of management with another provider: Documented in chart, if applicable     Complicating factors:    Care impacted by chronic illness: hypertension, anemia, dyslipidemia, vitamin D deficiency, gout, and obesity     Care affected by social determinants of health: Access to Medical Care     Disposition considerations:  I suspect the patient will be able to be discharged home but he will be signed out to Dr. Salinas at the change of shift with the MRI pending.      MEDICATIONS GIVEN IN THE EMERGENCY:  Medications   droperidol (INAPSINE) injection 0.625 mg (has no administration in time range)   diphenhydrAMINE (BENADRYL) injection 25 mg (has no administration in time range)   meclizine (ANTIVERT) tablet 25 mg (25 mg Oral $Given 7/2/23 1346)   droperidol (INAPSINE) injection 0.625 mg (0.625 mg Intravenous $Given 7/2/23 1410)   LORazepam (ATIVAN) injection 1 mg (1 mg Intravenous $Given 7/2/23 1435)       NEW PRESCRIPTIONS STARTED AT TODAY'S ER VISIT:  New Prescriptions    MECLIZINE (ANTIVERT) 25 MG TABLET    Take 1 tablet (25 mg) by  "mouth 3 times daily as needed for dizziness    PROCHLORPERAZINE (COMPAZINE) 10 MG TABLET    Take 0.5 tablets (5 mg) by mouth every 6 hours as needed for nausea or vomiting          =================================================================    HPI    Patient information was obtained from: patient and patient's wife    Use of : N/A         Arthur Lawson is a 57 year old male with a past medical history of hypertension, anemia, dyslipidemia, vitamin D deficiency, gout, and obesity who presents with dizziness and headache.    Patient reports ongoing dizziness, nausea, headache, and unsteadiness for the past 5 days. Patient states his head pain is \"like a pressure\" on the right side of his head. He has tried using Zofran for his nausea. Patient was evaluated in-clinic today and referred to the ED for further evaluation. Patient's wife states they tried to flush out the patient's ear while in-clinic today, but the procedure was unsuccessful and they were unable to extract any earwax, though there was earwax present in the ear.     Patient denies hearing loss or ear drainage. No other medical concerns are expressed at this time.       REVIEW OF SYSTEMS   Review of Systems   HENT: Negative for ear discharge and hearing loss.    Gastrointestinal: Positive for nausea.   Neurological: Positive for dizziness and headaches.        Positive for unsteadiness   All other systems reviewed and are negative.       PAST MEDICAL HISTORY:  Past Medical History:   Diagnosis Date     Achilles bursitis or tendinitis     Created by Conversion      Anemia, unspecified     Created by Conversion      Gouty arthropathy, unspecified     Created by Conversion      Unspecified essential hypertension      Unspecified essential hypertension     Created by Conversion        PAST SURGICAL HISTORY:  Past Surgical History:   Procedure Laterality Date     FINGER SURGERY       INGUINAL HERNIA REPAIR       OTHER SURGICAL HISTORY      " left knee arthroscopy     REFRACTIVE SURGERY       [facility] NONSPECIFIC PROCEDURE  1990    R inguinal herniorrhaphy       CURRENT MEDICATIONS:      Current Facility-Administered Medications:      diphenhydrAMINE (BENADRYL) injection 25 mg, 25 mg, Intravenous, Once, Jaiden Cannon MD     droperidol (INAPSINE) injection 0.625 mg, 0.625 mg, Intravenous, Once, Jaiden Cannon MD    Current Outpatient Medications:      meclizine (ANTIVERT) 25 MG tablet, Take 1 tablet (25 mg) by mouth 3 times daily as needed for dizziness, Disp: 20 tablet, Rfl: 0     prochlorperazine (COMPAZINE) 10 MG tablet, Take 0.5 tablets (5 mg) by mouth every 6 hours as needed for nausea or vomiting, Disp: 12 tablet, Rfl: 0     chlorthalidone (HYGROTON) 25 MG tablet, Take 1 tablet (25 mg) by mouth daily, Disp: 90 tablet, Rfl: 1     lisinopril (ZESTRIL) 40 MG tablet, Take 1 tablet (40 mg) by mouth daily, Disp: 90 tablet, Rfl: 3     omeprazole (PRILOSEC) 20 MG DR capsule, Take 20 mg by mouth daily (Patient not taking: Reported on 7/2/2023), Disp: , Rfl:      ondansetron (ZOFRAN) 4 MG tablet, Take 1 tablet (4 mg) by mouth every 8 hours as needed for nausea, Disp: 90 tablet, Rfl: 0    ALLERGIES:  Allergies   Allergen Reactions     Penicillins      policillin       FAMILY HISTORY:  Family History   Problem Relation Age of Onset     Colon Cancer Father      Retinal detachment Father      Pacemaker Father      Retinal detachment Paternal Grandfather        SOCIAL HISTORY:   Social History     Socioeconomic History     Marital status:      Spouse name: None     Number of children: None     Years of education: None     Highest education level: None   Tobacco Use     Smoking status: Never     Smokeless tobacco: Former     Tobacco comments:     Chews.   Substance and Sexual Activity     Alcohol use: Yes     Comment: Alcoholic Drinks/day: Socially     Drug use: No       PHYSICAL EXAM:    Vitals: /85   Pulse 61   Temp 97.6  F (36.4  C)  "(Temporal)   Resp 16   Ht 1.803 m (5' 11\")   Wt 97.5 kg (215 lb)   SpO2 95%   BMI 29.99 kg/m     Constitutional: Well developed, well nourished. Comfortable appearing.  HEAD:Normocephalic, atraumatic,   Eyes: PERRLA, left lateral nystagmus, otherwise EOM intact, conjunctiva clear, no discharge  ENT: mucous membranes moist, nose normal.   Neck- Supple, gross ROM intact.  No JVD.  No palpable nodes.  Pulmonary: Clear to auscultation bilaterally, no respiratory distress, no wheezing, speaks full sentences easily.  Chest: No chest wall tenderness  Cardiovascular: Normal heart rate, regular rhythm, no murmurs. No lower extremity edema, 2+ DP pulses.   GI: Soft, no tenderness to deep palpation in all quadrants, not distended, no masses.  No hepatosplenomegaly.  Musculoskeletal: Moving all 4 extremities intentionally and without pain. No obvious deformity.  Back: No CVA tenderness  Skin: Warm, dry, no rash.  Neurologic: Alert & oriented x 3, speech clear, moving all extremities spontaneously   Psychiatric: Affect normal, cooperative.     LAB:  All pertinent labs reviewed and interpreted.  Labs Ordered and Resulted from Time of ED Arrival to Time of ED Departure - No data to display    RADIOLOGY:  MR Brain w/o & w Contrast    (Results Pending)       EKG:   Performed at: 1:54 PM  Impression: Otherwise normal ECG  Rate: 60 BPM  Rhythm: Sinus rhythm with 1st degree A-V block  QRS Interval: 102 ms  QT/QTc Interval: 422/422 ms  Comparison: When compared with ECG of 02-JUL-2023 10:49, No significant change was found    I have independently reviewed and interpreted the EKG(s) documented above.       I, Melissa Cleveland, am serving as a scribe to document services personally performed by Dr. Jaiden Cannon based on my observation and the provider's statements to me. IJaiden M.D. attest that Melissa Cleveland is acting in a scribe capacity, has observed my performance of the services and has documented them in " accordance with my direction.      Jaiden Cannon M.D.  Emergency Medicine  [facility] EMERGENCY ROOM  [address] [id number] [phone]  Dept: [phone]       Jaiden Cannon MD  07/02/23 8866

## 2023-07-02 NOTE — PROGRESS NOTES
Assessment & Plan     Dizziness    - EKG 12-lead, tracing only    Diaphoresis    - EKG 12-lead, tracing only     Patient with a history of intermittent what sounds like vertigo symptoms for which he has vestibular therapy scheduled for the first time in 3 weeks with episode of dizziness different than usual episodes.  Usual episodes are much less severe and will resolve spontaneously.  Current episode has been going on for 5 days and worsening rather than improving.  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    He has a new right-sided headache, worse dizziness than usual.  Patient is dizzy even with sitting still.  Can improve dizziness somewhat with lying flat only.  Is nauseated.  Zofran does not seem to be relieving the dizziness or the nausea.  No vomiting.  No abdominal pain.  No weakness.  No chest pain.      EKG here is normal sinus without acute change.    Due to confluence of symptoms occluding concern for neurologic issue causing symptoms, recommended evaluation in the emergency room.  Spoke with Dr. Cannon in the [facility] ED which is across a parking lot.  Patient will call his wife to come back and drive him across parking lot.    Patient did have a cerumen impaction in the right ear on exam.  While waiting for his wife, we did clean this out to see if it helped with the right-sided headache symptom per his request.  Staff is unable to clear cerumen with flush.                 No follow-ups on file.    Danita Roper CNP  [facility]    Mariza Gibson is a 57 year old male who presents to clinic today for the following health issues:  Chief Complaint   Patient presents with     Dizziness     He has been seen for light headedness and dizziness for years, he was referred to Vestibular therapist but since Wednesday it has been pretty severe he cant  "focus the only thing that seems  to help is laying in bed pressure in head and right side seems to be worse      HPI    Patient with a history of hypertension and hyperlipidemia with dizziness and sweats.  Feels lightheaded and room spinning dizziness.  Better with lying down.  No chest pain.    Has had similar in the past for 9 years, but different for the last 5 days - more pressure in the head, right side.  Better if lying on right side.  Usually will \"fight through it.\"  Has to drive for work and had to pull over to take a nap at onset 5 days ago.      Has been told maybe Meniere's or Vitamin D deficiency.      Walking around at work minimally.  Dripping in sweat the last few days in air conditioning.  Not normal for him.      Has vestibular rehab appt coming up in 3 weeks.      Has been lying in bed for the last 24 hours.  Sitting up still feels dizzy when standing still.  Better with lying flat.      (Does not improve dizziness.  Still feels nauseated with it.        Review of Systems   Constitutional: Positive for appetite change (mild decrease ). Negative for chills and fever.   HENT: Negative for congestion, ear pain and rhinorrhea.    Respiratory: Positive for shortness of breath (A mild ).    Cardiovascular: Negative for chest pain.        No syncope, no near syncope.         Gastrointestinal: Positive for nausea (on zofran - doesn't help, normally it would). Negative for vomiting.        Burping up stomach acid.     Neurological: Negative for weakness.           Objective    /87 (BP Location: Right arm, Patient Position: Sitting, Cuff Size: Adult Large)   Pulse 68   Resp 16   Wt 97.2 kg (214 lb 4.8 oz)   SpO2 95%   BMI 29.89 kg/m    Physical Exam  HENT:      Right Ear: There is impacted cerumen.      Left Ear: Tympanic membrane normal.      Nose: No congestion.   Cardiovascular:      Rate and Rhythm: Normal rate and regular rhythm.      Pulses: Normal pulses.      Heart sounds: Normal heart " sounds.   Pulmonary:      Effort: Pulmonary effort is normal.   Skin:     General: Skin is warm.   Neurological:      Mental Status: He is oriented to person, place, and time.      Cranial Nerves: No cranial nerve deficit.      Motor: No weakness.      Coordination: Coordination normal.      Comments: A bit of a waddling/wide-based gait.     Psychiatric:         Mood and Affect: Mood normal.                Results for orders placed or performed in visit on 07/02/23   EKG 12-lead, tracing only     Status: None (Preliminary result)   Result Value Ref Range    Systolic Blood Pressure  mmHg    Diastolic Blood Pressure  mmHg    Ventricular Rate 66 BPM    Atrial Rate 66 BPM    CT Interval 200 ms    QRS Duration 100 ms     ms    QTc 440 ms    P Axis 59 degrees    R AXIS -10 degrees    T Axis 18 degrees    Interpretation ECG       Sinus rhythm  Minimal voltage criteria for LVH, may be normal variant  Borderline ECG  When compared with ECG of 09-JAN-2008 04:15,  Vent. rate has increased BY  25 BPM         EKG read by Danita Roper, CNP: Normal sinus rhythm without acute findings.Rate 66

## 2023-07-03 ENCOUNTER — TRANSFERRED RECORDS (OUTPATIENT)
Dept: HEALTH INFORMATION MANAGEMENT | Facility: CLINIC | Age: 58
End: 2023-07-03
Payer: COMMERCIAL

## 2023-07-07 ENCOUNTER — TRANSFERRED RECORDS (OUTPATIENT)
Dept: HEALTH INFORMATION MANAGEMENT | Facility: CLINIC | Age: 58
End: 2023-07-07
Payer: COMMERCIAL

## 2023-07-11 ENCOUNTER — OFFICE VISIT (OUTPATIENT)
Dept: INTERNAL MEDICINE | Facility: CLINIC | Age: 58
End: 2023-07-11
Payer: COMMERCIAL

## 2023-07-11 VITALS
SYSTOLIC BLOOD PRESSURE: 133 MMHG | WEIGHT: 224.4 LBS | OXYGEN SATURATION: 95 % | HEART RATE: 53 BPM | DIASTOLIC BLOOD PRESSURE: 93 MMHG | RESPIRATION RATE: 18 BRPM | BODY MASS INDEX: 31.42 KG/M2 | HEIGHT: 71 IN | TEMPERATURE: 97.6 F

## 2023-07-11 DIAGNOSIS — I10 ESSENTIAL HYPERTENSION: Primary | ICD-10-CM

## 2023-07-11 DIAGNOSIS — I67.1 ANEURYSM OF INTERNAL CAROTID ARTERY: ICD-10-CM

## 2023-07-11 DIAGNOSIS — R79.89 ELEVATED SERUM CREATININE: ICD-10-CM

## 2023-07-11 DIAGNOSIS — R42 DIZZINESS: ICD-10-CM

## 2023-07-11 DIAGNOSIS — M25.551 HIP PAIN, RIGHT: ICD-10-CM

## 2023-07-11 LAB
ANION GAP SERPL CALCULATED.3IONS-SCNC: 13 MMOL/L (ref 7–15)
BUN SERPL-MCNC: 15.8 MG/DL (ref 8–23)
CALCIUM SERPL-MCNC: 10.4 MG/DL (ref 8.6–10)
CHLORIDE SERPL-SCNC: 89 MMOL/L (ref 98–107)
CREAT SERPL-MCNC: 1.1 MG/DL (ref 0.67–1.17)
DEPRECATED HCO3 PLAS-SCNC: 27 MMOL/L (ref 22–29)
GFR SERPL CREATININE-BSD FRML MDRD: 77 ML/MIN/1.73M2
GLUCOSE SERPL-MCNC: 94 MG/DL (ref 70–99)
POTASSIUM SERPL-SCNC: 4.3 MMOL/L (ref 3.4–5.3)
SODIUM SERPL-SCNC: 129 MMOL/L (ref 136–145)

## 2023-07-11 PROCEDURE — 36415 COLL VENOUS BLD VENIPUNCTURE: CPT | Performed by: NURSE PRACTITIONER

## 2023-07-11 PROCEDURE — 80048 BASIC METABOLIC PNL TOTAL CA: CPT | Performed by: NURSE PRACTITIONER

## 2023-07-11 PROCEDURE — 99214 OFFICE O/P EST MOD 30 MIN: CPT | Performed by: NURSE PRACTITIONER

## 2023-07-11 RX ORDER — METHYLPREDNISOLONE 4 MG
TABLET, DOSE PACK ORAL
COMMUNITY
Start: 2023-07-07 | End: 2023-10-17

## 2023-07-11 RX ORDER — AMLODIPINE BESYLATE 5 MG/1
5 TABLET ORAL DAILY
Qty: 90 TABLET | Refills: 0 | Status: SHIPPED | OUTPATIENT
Start: 2023-07-11 | End: 2023-10-09

## 2023-07-11 NOTE — PATIENT INSTRUCTIONS
Stop chlorthalidone.    Start amlodipine 5 mg daily.    If after 1 week, blood pressure has not come down to consistently less than 140/90, start taking 2 tablets of the amlodipine for dose of 10 mg daily.    Follow-up in 2 weeks for blood pressure recheck.

## 2023-07-11 NOTE — PROGRESS NOTES
Assessment & Plan     Essential hypertension  Uncontrolled.  His creatinine shot up to 1.6 while he was evaluated in the ER.  Suspect that this was as a result of starting the chlorthalidone.  We will have him stop this medication, switch to amlodipine, and follow-up in clinic in 2 weeks for blood pressure recheck.  See patient instructions below for plan of care    - Basic metabolic panel; Future  - amLODIPine (NORVASC) 5 MG tablet; Take 1 tablet (5 mg) by mouth daily    Hip pain, right  He is working with [employer] now and is completing a Medrol Dosepak.  Plans on starting physical therapy at [facility] soon    Elevated serum creatinine  As above    Dizziness  He was very dizzy recently and was evaluated in the ED with CT/MRI of the head, unremarkable save for a small 2 x 2 millimeter ICA aneurysm.  He does have an appointment with the vestibular rehabilitation specialist soon    Patient Instructions   Stop chlorthalidone.    Start amlodipine 5 mg daily.    If after 1 week, blood pressure has not come down to consistently less than 140/90, start taking 2 tablets of the amlodipine for dose of 10 mg daily.    Follow-up in 2 weeks for blood pressure recheck.      Jai Garvin, [facility]    Mariza Gibson is a 57 year old, presenting for the following health issues:  Hypertension        7/11/2023     7:16 AM   Additional Questions   Roomed by Patricia     History of Present Illness       Hypertension: He presents for follow up of hypertension.  He does check blood pressure  regularly outside of the clinic. Outside blood pressures have been over 140/90. He does not follow a low salt diet.     Reason for visit:  Follow up on hip pain dizziness and blood pressure    He eats 0-1 servings of fruits and vegetables daily.He consumes 4 sweetened beverage(s) daily.He exercises with enough effort to increase his heart rate 9 or less minutes per day.  He exercises with enough effort to  "increase his heart rate 3 or less days per week.   He is taking medications regularly.           Review of Systems   Constitutional, HEENT, cardiovascular, pulmonary, gi and gu systems are negative, except as otherwise noted.      Objective    BP (!) 159/101 (BP Location: Right arm, Patient Position: Sitting, Cuff Size: Adult Regular)   Pulse 57   Temp 97.6  F (36.4  C) (Oral)   Resp 18   Ht 1.803 m (5' 10.98\")   Wt 101.8 kg (224 lb 6.4 oz)   SpO2 95%   BMI 31.31 kg/m    Body mass index is 31.31 kg/m .  Physical Exam     Patient is healthy-appearing and in no acute distress      "

## 2023-07-12 ENCOUNTER — TRANSFERRED RECORDS (OUTPATIENT)
Dept: HEALTH INFORMATION MANAGEMENT | Facility: CLINIC | Age: 58
End: 2023-07-12
Payer: COMMERCIAL

## 2023-07-12 ENCOUNTER — MYC MEDICAL ADVICE (OUTPATIENT)
Dept: INTERNAL MEDICINE | Facility: CLINIC | Age: 58
End: 2023-07-12
Payer: COMMERCIAL

## 2023-07-27 ENCOUNTER — TRANSFERRED RECORDS (OUTPATIENT)
Dept: HEALTH INFORMATION MANAGEMENT | Facility: CLINIC | Age: 58
End: 2023-07-27
Payer: COMMERCIAL

## 2023-08-03 ENCOUNTER — THERAPY VISIT (OUTPATIENT)
Dept: OCCUPATIONAL THERAPY | Facility: REHABILITATION | Age: 58
End: 2023-08-03
Payer: COMMERCIAL

## 2023-08-03 DIAGNOSIS — Z78.9 DECREASED ACTIVITIES OF DAILY LIVING (ADL): ICD-10-CM

## 2023-08-03 DIAGNOSIS — R42 DIZZINESS: Primary | ICD-10-CM

## 2023-08-03 DIAGNOSIS — R42 DIZZINESS: ICD-10-CM

## 2023-08-03 DIAGNOSIS — R26.81 UNSTEADINESS: Primary | ICD-10-CM

## 2023-08-03 PROCEDURE — 97112 NEUROMUSCULAR REEDUCATION: CPT | Mod: GO | Performed by: OCCUPATIONAL THERAPIST

## 2023-08-03 PROCEDURE — 97165 OT EVAL LOW COMPLEX 30 MIN: CPT | Mod: GO | Performed by: OCCUPATIONAL THERAPIST

## 2023-08-03 NOTE — PROGRESS NOTES
"OCCUPATIONAL THERAPY EVALUATION  Type of Visit: Evaluation    See electronic medical record for Abuse and Falls Screening details.    Subjective      Presenting condition or subjective complaint: Patient presents with dizziness, unsteadiness, nausea, headache. He went to the ED on 23. Patient has 9 year history of these episodes that occur monthly and last about a day or two. This most recent episode was very intense and has not fully resolved. He denies hearing changes, tinnitus or ear pain.    Date of onset: 23    Relevant medical history:  hypertension, anemia, dyslipidemia, vitamin D deficiency, gout, and obesity who presents with dizziness and headache   Dates & types of surgery: Hernia and knee surgery 25+   years ago    Prior diagnostic imaging/testing results:     CT in the ED with note stating \"There was a small left ICA aneurysm\"  Prior therapy history for the same diagnosis, illness or injury: No      Living Environment  Social support: With a significant other or spouse   Type of home: House   Stairs to enter the home: No       Ramp: Yes   Stairs inside the home: Yes 14 Is there a railing: Yes   Help at home: None  Equipment owned:       Employment: Yes Food safety/Brand protection  Hobbies/Interests: Seeonic    Patient goals for therapy: Sometimes its so bad I just have to lay down so prefer not to have these symptoms.    Pain assessment: Pain present  Location: back/hip/Ratin/10     Objective     SENSATION: UE Sensation WNL, LE Sensation WNL    FUNCTIONAL MOBILITY/BALANCE:   Ambulation: mild unsteadiness    ACTIVITY TOLERANCE: Good    INSTRUMENTAL ACTIVITIES OF DAILY LIVING (IADL): Independent but is careful when episodes are occurring       VESTIBULAR EVALUATION  ADDITIONAL HISTORY:  Description of symptoms: Off balance; Attacks of dizziness; Light-headedness  Dizzy attacks:   Start: 9 years ago   Last attack: Last week   Frequency of occurrences: Totally random....sometimes daily, other " times can go a month in between   Length of attack: 2-12 hours  Difficulty hearing:    Noise in ears? No    Alleviates symptoms: sleep  Worsens symptoms:    Activities that bring on symptoms:           Oculomotor Screen    Ocular ROM Normal   Smooth Pursuit Abnormal   Saccades Normal   VOR Normal        Infrared Goggle Exam Vestibular Suppressant in Last 24 Hours? No  Exam Completed With: Infrared goggles   Spontaneous Nystagmus Negative   Gaze Evoked Nystagmus Negative   Head Shake Horizontal Nystagmus Horizontal R    Left Right   Louise-Hallpike Horizontal L Negative   Sidelying Test Horizontal L Negative   [place] and Lean Test -  Sitting Erect Negative   [place] and Lean Test - Seated, Head Bent 60 Degrees Forward Negative   [place] and Lean Test - Seated, Head Bent Backwards Negative      BPPV Canal(s): N/A  BPPV Type: N/A      Assessment & Plan   CLINICAL IMPRESSIONS  Medical Diagnosis: dizziness    Treatment Diagnosis: dizziness, unsteadiness    Impression/Assessment: Pt is a 57 year old male presenting to Occupational Therapy due to episodes of dizziness with unclear etiology.  The following significant findings have been identified: Impaired activity tolerance and Impaired mobility.  These identified deficits interfere with their ability to perform self care tasks, work tasks, and recreational activities as compared to previous level of function.     Clinical Decision Making (Complexity):  Assessment of Occupational Performance: 1-3 Performance Deficits  Occupational Performance Limitations: functional mobility, home establishment and management, work, and leisure activities  Clinical Decision Making (Complexity): Low complexity    PLAN OF CARE  Treatment Interventions:  Interventions: Neuromuscular Re-education    Long Term Goals   OT Goal 1  Goal Description: Patient will be able to turn head without dizziness  Target Date: 10/26/23  OT Goal 2  Goal Description: Patient will be able to walk with head motion without  dizziness or unsteadiness  Target Date: 10/26/23      Frequency of Treatment: once a week  Duration of Treatment: 12 weeks     Recommended Referrals to Other Professionals: ENT  Education Assessment: Learner/Method: Patient     Risks and benefits of evaluation/treatment have been explained.   Patient/Family/caregiver agrees with Plan of Care.     Evaluation Time:    OT Cooper, Low Complexity Minutes (79721): 30       Signing Clinician: Abigail Pearson OT

## 2023-08-06 ENCOUNTER — APPOINTMENT (OUTPATIENT)
Dept: RADIOLOGY | Facility: CLINIC | Age: 58
End: 2023-08-06
Attending: EMERGENCY MEDICINE
Payer: COMMERCIAL

## 2023-08-06 ENCOUNTER — HOSPITAL ENCOUNTER (EMERGENCY)
Facility: CLINIC | Age: 58
Discharge: HOME | End: 2023-08-06
Admitting: PHYSICIAN ASSISTANT
Payer: COMMERCIAL

## 2023-08-06 VITALS
RESPIRATION RATE: 18 BRPM | BODY MASS INDEX: 30.8 KG/M2 | OXYGEN SATURATION: 92 % | TEMPERATURE: 97.6 F | SYSTOLIC BLOOD PRESSURE: 125 MMHG | DIASTOLIC BLOOD PRESSURE: 96 MMHG | HEART RATE: 72 BPM | HEIGHT: 71 IN | WEIGHT: 220 LBS

## 2023-08-06 DIAGNOSIS — T18.9XXA SWALLOWED FOREIGN BODY, INITIAL ENCOUNTER: ICD-10-CM

## 2023-08-06 PROCEDURE — 99283 EMERGENCY DEPT VISIT LOW MDM: CPT | Mod: 25

## 2023-08-06 PROCEDURE — 71046 X-RAY EXAM CHEST 2 VIEWS: CPT

## 2023-08-06 PROCEDURE — 70360 X-RAY EXAM OF NECK: CPT

## 2023-08-06 PROCEDURE — 74018 RADEX ABDOMEN 1 VIEW: CPT

## 2023-08-06 ASSESSMENT — ENCOUNTER SYMPTOMS
ABDOMINAL PAIN: 1
NAUSEA: 0
CHOKING: 0
COUGH: 0
WHEEZING: 0

## 2023-08-07 NOTE — DISCHARGE INSTRUCTIONS
You were seen here in the emergency department for evaluation of foreign body.  Your x-rays here did not show any evidence of abnormality.  I suspect he likely passed the foreign body in your stomach, and will pass it in your stool.  Keep an eye out for the foreign body over the next few days.  Return to the emergency department if develop any new or worsening symptoms such as difficulty breathing, significant abdominal pain, or changes in your bowel movements.  Otherwise, please follow with your primary care doctor as needed.  Dose recommendations for pain are included below.    For pain or fever you may use:  -Tylenol 650 mg every 6 hours.  Max 4000 mg in 24 hours  Do not use thismedication with alcohol as it can cause liver problems.  -Ibuprofen 600 mg every 6 hours.  Max 3500 mg in 24 hours  Do not take this medication if you have a history of a GI bleed or have kidney problems.  You may use both of these medications at the same time or you can alternate them every 3 hours.  For example, Tylenol at 6 AM, ibuprofen at 9 AM, Tylenol at noon, etc.

## 2023-08-07 NOTE — ED TRIAGE NOTES
Pt is coming in tonight after he think that he swallowed the bottom partial around 2100. Denies any SOB, Pain and is able to speak in complete sentences and swallow his secretions. PT states that it does not seem to be stuck in his throat.      Triage Assessment       Row Name 08/06/23 2120       Triage Assessment (Adult)    Airway WDL WDL       Respiratory WDL    Respiratory WDL WDL       Skin Circulation/Temperature WDL    Skin Circulation/Temperature WDL WDL       Cardiac WDL    Cardiac WDL WDL       Peripheral/Neurovascular WDL    Peripheral Neurovascular WDL WDL       Cognitive/Neuro/Behavioral WDL    Cognitive/Neuro/Behavioral WDL WDL

## 2023-08-07 NOTE — ED PROVIDER NOTES
EMERGENCY DEPARTMENT ENCOUNTER      NAME: Arthur Lawson  AGE: 58 year old male  YOB: 1965  MRN: [id number]  EVALUATION DATE & TIME: 8/6/2023 10:18 PM    PCP: Jai Garvin    ED PROVIDER: Prince Moreno PA-C      Chief Complaint   Patient presents with    Swallowed Foreign Body     FINAL IMPRESSION:  1. Swallowed foreign body, initial encounter      ED COURSE & MEDICAL DECISION MAKING:    Pertinent Labs & Imaging studies reviewed. (See chart for details)  10:21 PM I met the patient and performed my initial interview and exam.   2250 PM Patient updated on imaging, plan for discharge    58 year old male presents to the Emergency Department for evaluation of swallowing foreign body.     ED Course as of 08/07/23 0022   Sun Aug 06, 2023   2227 Patient is a 58-year-old male, presents emergency department for evaluation of possible foreign body.  Patient reportedly was taking some pills tonight, feels that he swallowed his dental partial.  He reports that the dental pressure was plastic, does not have any malalignment, is approximately quarter sized.  He felt like he swallowed it, thought it was a pill when he was following it.  He denies any shortness breath, chest pain, dizziness, lightheadedness, numbness or tingling.  Not really having any abdominal pain.  No tenderness, rebound or guarding.  X-ray imaging was obtained here in the emergency department, and I independently reviewed it.  Given that this is not a radiopaque foreign body, I think will be impossible to visualize on x-ray, I do not appreciate on x-ray imaging here.  Pending radiology final reads.  Given that this was a small plastic foreign body, I think would likely pass without much difficulty.  Certainly do not think patient needs criteria for endoscopy, or further imaging at this point.   2230 X-ray of the abdomen does not show any acute abnormalities.  Bowel gas pattern is normal.  Nothing for obstruction or free air.   X-ray of the chest here is normal, no radiopaque foreign body.   2246 XR of the neck here is normal.   Mon Aug 07, 2023   0022 Patient reportedly swallowed a plastic dental implant.  No model.  Not seen on x-rays, however given that is not having any respiratory distress, bowel symptoms, unlikely to have caused any significant abnormalities.  Patient will pass this in his stool.  Discussed this with him.  Discussed return precautions including worsening pain, nausea, vomiting, or any difficulty breathing.  He expressed understanding.  Vitals here are stable, exam here unremarkable.  Plan for discharge at this time.  Patient agreeable.       Medical Decision Making    History:  Supplemental history from: Documented in chart, if applicable  External Record(s) reviewed: Documented in chart, if applicable.    Work Up:  Chart documentation includes differential considered and any EKGs or imaging independently interpreted by provider, where specified.  In additional to work up documented, I considered the following work up: Documented in chart, if applicable.    External consultation:  Discussion of management with another provider: Documented in chart, if applicable    Complicating factors:  Care impacted by chronic illness: Hyperlipidemia and Hypertension  Care affected by social determinants of health: N/A    Disposition considerations: Discharge. No recommendations on prescription strength medication(s). N/A.             At the conclusion of the encounter I discussed the results of all of the tests and the disposition. The questions were answered. The patient or family acknowledged understanding and was agreeable with the care plan.         0 minutes of critical care time     MEDICATIONS GIVEN IN THE EMERGENCY:  Medications - No data to display    NEW PRESCRIPTIONS STARTED AT TODAY'S ER VISIT  Discharge Medication List as of 8/6/2023 10:50 PM              =================================================================    HPI    Patient information was obtained from: patient     Use of : N/A      Arthur Lawson is a 58 year old male with a pertinent history of hypertension and hyperlipidemia who presents to this ED via walk-in for evaluation of swallowed foreign body.     The patient presents after he accidentally swallowed his partial denture that has 2 teeth connected to a ~1 inch long piece of plastic. This occurred while he was taking pills and swallowed this piece thinking that it was a pill. He has some pain in his epigastrium now but had italian food for dinner and thinks it may be GERD. He has not had any coughing, choking, or wheezing. He denies nausea or any other complaints at this time.     REVIEW OF SYSTEMS   Review of Systems   HENT:          Positive for swallowed foreign body   Respiratory:  Negative for cough, choking and wheezing.    Gastrointestinal:  Positive for abdominal pain (epigastric). Negative for nausea.   All other systems reviewed and are negative.       PAST MEDICAL HISTORY:  Past Medical History:   Diagnosis Date    Achilles bursitis or tendinitis     Created by Conversion     Anemia, unspecified     Created by Conversion     Gouty arthropathy, unspecified     Created by Conversion     Unspecified essential hypertension     Unspecified essential hypertension     Created by Conversion        PAST SURGICAL HISTORY:  Past Surgical History:   Procedure Laterality Date    FINGER SURGERY      INGUINAL HERNIA REPAIR      OTHER SURGICAL HISTORY      left knee arthroscopy    REFRACTIVE SURGERY      [facility] NONSPECIFIC PROCEDURE  1990    R inguinal herniorrhaphy           CURRENT MEDICATIONS:    amLODIPine (NORVASC) 5 MG tablet  lisinopril (ZESTRIL) 40 MG tablet  LORazepam (ATIVAN) 0.5 MG tablet  meclizine (ANTIVERT) 25 MG tablet  methylPREDNISolone (MEDROL DOSEPAK) 4 MG tablet therapy pack  omeprazole (PRILOSEC) 20 MG   "capsule  ondansetron (ZOFRAN) 4 MG tablet  prochlorperazine (COMPAZINE) 10 MG tablet         ALLERGIES:  Allergies   Allergen Reactions    Penicillins      policillin       FAMILY HISTORY:  Family History   Problem Relation Age of Onset    Colon Cancer Father     Retinal detachment Father     Pacemaker Father     Retinal detachment Paternal Grandfather        SOCIAL HISTORY:   Social History     Socioeconomic History    Marital status:    Tobacco Use    Smoking status: Never    Smokeless tobacco: Former     Types: Chew    Tobacco comments:     Chews.   Substance and Sexual Activity    Alcohol use: Yes     Comment: Alcoholic Drinks/day: Socially    Drug use: No       VITALS:  BP (!) 125/96   Pulse 72   Temp 97.6  F (36.4  C) (Temporal)   Resp 18   Ht 1.803 m (5' 11\")   Wt 99.8 kg (220 lb)   SpO2 92%   BMI 30.68 kg/m      PHYSICAL EXAM    Physical Exam  Vitals and nursing note reviewed.   Constitutional:       General: He is not in acute distress.     Appearance: Normal appearance. He is normal weight. He is not toxic-appearing or diaphoretic.   HENT:      Right Ear: External ear normal.      Left Ear: External ear normal.   Eyes:      Conjunctiva/sclera: Conjunctivae normal.   Cardiovascular:      Rate and Rhythm: Normal rate and regular rhythm.   Pulmonary:      Effort: Pulmonary effort is normal. No respiratory distress.      Breath sounds: No wheezing or rales.   Abdominal:      General: Abdomen is flat. There is no distension.      Palpations: Abdomen is soft.      Tenderness: There is no abdominal tenderness. There is no right CVA tenderness, left CVA tenderness, guarding or rebound.   Skin:     Findings: No erythema or rash.   Neurological:      Mental Status: He is alert. Mental status is at baseline.           LAB:  All pertinent labs reviewed and interpreted.  Labs Ordered and Resulted from Time of ED Arrival to Time of ED Departure - No data to display    RADIOLOGY:  Reviewed all pertinent " imaging. Please see official radiology report.  XR Abdomen Upright Only   Final Result   IMPRESSION: Negative abdomen. Bowel gas pattern is normal. Nothing for obstruction or free air. No radiopaque foreign body.      Chest XR,  PA & LAT   Final Result   IMPRESSION: Heart size and pulmonary vessels are normal. Lungs are clear. No radiopaque foreign body identified.      Neck soft tissue XR   Final Result   IMPRESSION: No radiopaque foreign body. No prevertebral edema. Normal appearance of the epiglottis and larynx. No airway compromise.            I, Adarsh Freitas, am serving as a scribe to document services personally performed by Prince Moreno PA-C, based on my observation and the provider's statements to me. I, Prince Moreno PA-C, attest that Adarsh Freitas is acting in a scribe capacity, has observed my performance of the services and has documented them in accordance with my direction.    Prince Moreno PA-C  Emergency Medicine  [facility] EMERGENCY ROOM  [address] [id number] [phone]  Dept: [phone]       Prince Moreno PA-C  08/07/23 0023

## 2023-08-10 ENCOUNTER — THERAPY VISIT (OUTPATIENT)
Dept: OCCUPATIONAL THERAPY | Facility: REHABILITATION | Age: 58
End: 2023-08-10
Payer: COMMERCIAL

## 2023-08-10 DIAGNOSIS — R42 DIZZINESS: Primary | ICD-10-CM

## 2023-08-10 DIAGNOSIS — R26.81 UNSTEADINESS: ICD-10-CM

## 2023-08-10 DIAGNOSIS — Z78.9 DECREASED ACTIVITIES OF DAILY LIVING (ADL): ICD-10-CM

## 2023-08-10 PROCEDURE — 97112 NEUROMUSCULAR REEDUCATION: CPT | Mod: GO | Performed by: OCCUPATIONAL THERAPIST

## 2023-08-14 ENCOUNTER — TRANSFERRED RECORDS (OUTPATIENT)
Dept: HEALTH INFORMATION MANAGEMENT | Facility: CLINIC | Age: 58
End: 2023-08-14
Payer: COMMERCIAL

## 2023-08-25 ENCOUNTER — TELEPHONE (OUTPATIENT)
Dept: INTERNAL MEDICINE | Facility: CLINIC | Age: 58
End: 2023-08-25
Payer: COMMERCIAL

## 2023-08-25 ENCOUNTER — TRANSFERRED RECORDS (OUTPATIENT)
Dept: HEALTH INFORMATION MANAGEMENT | Facility: CLINIC | Age: 58
End: 2023-08-25
Payer: COMMERCIAL

## 2023-08-25 ENCOUNTER — MYC MEDICAL ADVICE (OUTPATIENT)
Dept: INTERNAL MEDICINE | Facility: CLINIC | Age: 58
End: 2023-08-25
Payer: COMMERCIAL

## 2023-08-25 NOTE — TELEPHONE ENCOUNTER
Needs form filled out. Need back ASAP. Please call pt to come pick it up when done. Says if you can email it to him he would prefer that.

## 2023-08-28 NOTE — TELEPHONE ENCOUNTER
COVID Positive/Requesting COVID treatment    Patient is positive for COVID and requesting treatment options.    Date of positive COVID test (PCR or at home)? Home  8/28/23     Current COVID symptoms: cough, fatigue, muscle or body aches, sore throat, and congestion or runny nose    Date COVID symptoms began: 8/27/23    Message should be routed to clinic RN pool. Best phone number to use for call back: [phone]

## 2023-08-29 ENCOUNTER — VIRTUAL VISIT (OUTPATIENT)
Dept: INTERNAL MEDICINE | Facility: CLINIC | Age: 58
End: 2023-08-29
Payer: COMMERCIAL

## 2023-08-29 DIAGNOSIS — U07.1 CLINICAL DIAGNOSIS OF COVID-19: Primary | ICD-10-CM

## 2023-08-29 DIAGNOSIS — R42 DIZZINESS: ICD-10-CM

## 2023-08-29 DIAGNOSIS — I10 ESSENTIAL HYPERTENSION: ICD-10-CM

## 2023-08-29 PROCEDURE — 99214 OFFICE O/P EST MOD 30 MIN: CPT | Mod: VID | Performed by: NURSE PRACTITIONER

## 2023-08-29 RX ORDER — MECLIZINE HYDROCHLORIDE 25 MG/1
25 TABLET ORAL 3 TIMES DAILY PRN
Qty: 20 TABLET | Refills: 0 | Status: SHIPPED | OUTPATIENT
Start: 2023-08-29 | End: 2024-04-22

## 2023-08-29 NOTE — PATIENT INSTRUCTIONS
Paperwork completed and given to my clinical assistant.  We will email you the completed (revised) form.    Follow-up in 1 month for blood pressure recheck.    Prescription for Paxlovid sent into pharmacy

## 2023-08-29 NOTE — PROGRESS NOTES
Arthur is a 58 year old who is being evaluated via a billable video visit.      How would you like to obtain your AVS? MyChart  If the video visit is dropped, the invitation should be resent by: Text to cell phone: [phone]  Will anyone else be joining your video visit? No          Assessment & Plan     Clinical diagnosis of COVID-19  He is interested in starting antivirals.  He does meet criteria for starting the Paxlovid.  We talked about warning signs and symptoms for which he should seek immediate medical evaluation  - nirmatrelvir and ritonavir (PAXLOVID) 300 mg/100 mg therapy pack; Take 3 tablets by mouth 2 times daily for 5 days (Take 2 Nirmatrelvir tablets and 1 Ritonavir tablet twice daily for 5 days)    Dizziness  Working with vestibular rehabilitation.  Vestibular rehab recommended he see ENT.  He does have an ENT appointment set up.  In the meantime he seems to get relief from meclizine.  He had a trip booked but is unable to go on the cruise due to a flare of his underlying dizziness and vertigo.  I completed some paperwork with him today so that he can get a refund  - meclizine (ANTIVERT) 25 MG tablet; Take 1 tablet (25 mg) by mouth 3 times daily as needed for dizziness    Essential hypertension  He has not been checking at home but does have a cuff.  We switched him from chlorthalidone to amlodipine at his last appointment due to hyponatremia and a spike in his creatinine.  Advised that he come back and see me in a month for blood pressure recheck    Patient Instructions   Paperwork completed and given to my clinical assistant.  We will email you the completed (revised) form.    Follow-up in 1 month for blood pressure recheck.    Prescription for Paxlovid sent into pharmacy        Jai Garvin [facility]   Arthur is a 58 year old, presenting for the following health issues:    covid positive (Sore throat, headache, loss of taste, runny  nose since Sunday. Tested positive yesterday)      8/29/2023     7:03 AM   Additional Questions   Roomed by Tali KELLYID-19 Symptom Review  How many days ago did these symptoms start? Sunday     Are any of the following symptoms significant for you?  New or worsening difficulty breathing? No  Worsening cough? Yes, it's a dry cough.   Fever or chills? Yes, I felt feverish or had chills.  Headache: YES  Sore throat: YES  Chest pain: No  Diarrhea: No  Body aches? YES    What treatments has patient tried? Acetaminophen   Does patient live in a nursing home, group home, or shelter? No  Does patient have a way to get food/medications during quarantined? Yes, I have a friend or family member who can help me.              Review of Systems   Constitutional, HEENT, cardiovascular, pulmonary, gi and gu systems are negative, except as otherwise noted.      Objective           Vitals:  No vitals were obtained today due to virtual visit.    Physical Exam   GENERAL: Healthy, alert and no distress  EYES: Eyes grossly normal to inspection.  No discharge or erythema, or obvious scleral/conjunctival abnormalities.  RESP: No audible wheeze, cough, or visible cyanosis.  No visible retractions or increased work of breathing.    SKIN: Visible skin clear. No significant rash, abnormal pigmentation or lesions.  NEURO: Cranial nerves grossly intact.  Mentation and speech appropriate for age.  PSYCH: Mentation appears normal, affect normal/bright, judgement and insight intact, normal speech and appearance well-groomed.          Video-Visit Details    Type of service:  Video Visit     Originating Location (pt. Location): Home    Distant Location (provider location):  On-site  Platform used for Video Visit: [employer]

## 2023-09-01 ENCOUNTER — NURSE TRIAGE (OUTPATIENT)
Dept: NURSING | Facility: CLINIC | Age: 58
End: 2023-09-01
Payer: COMMERCIAL

## 2023-09-01 NOTE — TELEPHONE ENCOUNTER
Nurse Triage SBAR    Is this a 2nd Level Triage? YES, LICENSED PRACTITIONER REVIEW IS REQUIRED    Situation: Patient calling with c/o vertigo/dizziness.  Consent: not needed    Background: Pt states that he is being treated for vertigo but since having COVID this has gotten worse. Pt states that every time he lays down the room starts spinning and he becomes nauseated. Pt cannot get any sleep d/t this and it is also waking him up when he does drift off.     Assessment: Pt taking Meclizine for vertigo and is also taking Paxlovid for COVID.    Pt asking if there is anything else PCP would advise at this time. Pt has 3 doses of Paxlovid left.    Protocol Recommended Disposition:   Go To ED/UCC Now (Or To Office With PCP Approval)    Recommendation: Advised patient to  wait for call back from care team after discussing with PCP.  Advised HC. Reviewed concerning symptoms and when to call back.     Routed to provider - please call pt to advise @[phone] (home)       Does the patient meet one of the following criteria for ADS visit consideration? 16+ years old, with an [facility] PCP     TIP  Providers, please consider if this condition is appropriate for management at one of our Acute and Diagnostic Services sites.     If patient is a good candidate, please use dotphrase <dot>triageresponse and select Refer to ADS to document.         MANOJ BLACKWOOD RN [facility] 9/1/2023 1:34 PM    Reason for Disposition   Spinning or tilting sensation (vertigo) present now and one or more stroke risk factors (i.e., hypertension, diabetes mellitus, prior stroke/TIA, heart attack, age over 60) (Exception: Prior physician evaluation for this AND no different/worse than usual.)    Additional Information   Negative: SEVERE difficulty breathing (e.g., struggling for each breath, speaks in single words)   Negative: Shock suspected (e.g., cold/pale/clammy skin, too weak to stand, low BP, rapid pulse)   Negative: Difficult to  awaken or acting confused (e.g., disoriented, slurred speech)   Negative: Fainted, and still feels dizzy afterwards   Negative: Overdose (accidental or intentional) of medications   Negative: New neurologic deficit that is present now: * Weakness of the face, arm, or leg on one side of the body * Numbness of the face, arm, or leg on one side of the body * Loss of speech or garbled speech   Negative: Heart beating < 50 beats per minute OR > 140 beats per minute   Negative: Sounds like a life-threatening emergency to the triager   Negative: Chest pain   Negative: Rectal bleeding, bloody stool, or tarry-black stool   Negative: Vomiting is main symptom   Negative: Diarrhea is main symptom   Negative: Headache is main symptom   Negative: Heat exhaustion suspected (i.e., dehydration from heat exposure)   Negative: Patient states that they are having an anxiety or panic attack   Negative: Dizziness from low blood sugar (i.e., < 60 mg/dl or 3.5 mmol/l)   Negative: SEVERE dizziness (e.g., unable to stand, requires support to walk, feels like passing out now)   Negative: SEVERE headache or neck pain    Protocols used: Dizziness-A-OH

## 2023-09-01 NOTE — TELEPHONE ENCOUNTER
S-(situation): Call back to patient with advice to go to     B-(background): See previous    A-(assessment): Call to patient with these instructions. He sounds miserable - is agreeable to come to [facility].    R-(recommendations): No further action - pt agrees to come to walk-in.

## 2023-09-03 ENCOUNTER — HOSPITAL ENCOUNTER (EMERGENCY)
Facility: CLINIC | Age: 58
Discharge: HOME | End: 2023-09-03
Attending: STUDENT IN AN ORGANIZED HEALTH CARE EDUCATION/TRAINING PROGRAM | Admitting: STUDENT IN AN ORGANIZED HEALTH CARE EDUCATION/TRAINING PROGRAM
Payer: COMMERCIAL

## 2023-09-03 ENCOUNTER — OFFICE VISIT (OUTPATIENT)
Dept: FAMILY MEDICINE | Facility: CLINIC | Age: 58
End: 2023-09-03
Payer: COMMERCIAL

## 2023-09-03 VITALS
BODY MASS INDEX: 30.8 KG/M2 | HEART RATE: 58 BPM | DIASTOLIC BLOOD PRESSURE: 102 MMHG | TEMPERATURE: 97.5 F | OXYGEN SATURATION: 97 % | HEIGHT: 71 IN | RESPIRATION RATE: 18 BRPM | WEIGHT: 220 LBS | SYSTOLIC BLOOD PRESSURE: 163 MMHG

## 2023-09-03 VITALS
OXYGEN SATURATION: 96 % | DIASTOLIC BLOOD PRESSURE: 83 MMHG | HEART RATE: 78 BPM | RESPIRATION RATE: 19 BRPM | TEMPERATURE: 98 F | SYSTOLIC BLOOD PRESSURE: 119 MMHG

## 2023-09-03 DIAGNOSIS — I67.1 ANEURYSM OF INTERNAL CAROTID ARTERY: ICD-10-CM

## 2023-09-03 DIAGNOSIS — R42 DIZZINESS: Primary | ICD-10-CM

## 2023-09-03 DIAGNOSIS — I10 ESSENTIAL HYPERTENSION: ICD-10-CM

## 2023-09-03 DIAGNOSIS — R42 VERTIGO: ICD-10-CM

## 2023-09-03 LAB
ALBUMIN SERPL-MCNC: 4.2 G/DL (ref 3.5–5)
ALP SERPL-CCNC: 59 U/L (ref 45–120)
ALT SERPL W P-5'-P-CCNC: 36 U/L (ref 0–45)
ANION GAP SERPL CALCULATED.3IONS-SCNC: 9 MMOL/L (ref 5–18)
AST SERPL W P-5'-P-CCNC: 21 U/L (ref 0–40)
ATRIAL RATE - MUSE: 64 BPM
BASOPHILS # BLD AUTO: 0 10E3/UL (ref 0–0.2)
BASOPHILS NFR BLD AUTO: 1 %
BILIRUB SERPL-MCNC: 1.1 MG/DL (ref 0–1)
BUN SERPL-MCNC: 11 MG/DL (ref 8–22)
CALCIUM SERPL-MCNC: 10.6 MG/DL (ref 8.5–10.5)
CHLORIDE BLD-SCNC: 102 MMOL/L (ref 98–107)
CO2 SERPL-SCNC: 27 MMOL/L (ref 22–31)
CREAT SERPL-MCNC: 1 MG/DL (ref 0.7–1.3)
DIASTOLIC BLOOD PRESSURE - MUSE: NORMAL MMHG
EOSINOPHIL # BLD AUTO: 0.1 10E3/UL (ref 0–0.7)
EOSINOPHIL NFR BLD AUTO: 2 %
ERYTHROCYTE [DISTWIDTH] IN BLOOD BY AUTOMATED COUNT: 14.5 % (ref 10–15)
GFR SERPL CREATININE-BSD FRML MDRD: 86 ML/MIN/1.73M2
GLUCOSE BLD-MCNC: 94 MG/DL (ref 70–125)
HCT VFR BLD AUTO: 40 % (ref 40–53)
HGB BLD-MCNC: 13.4 G/DL (ref 13.3–17.7)
IMM GRANULOCYTES # BLD: 0.1 10E3/UL
IMM GRANULOCYTES NFR BLD: 1 %
INTERPRETATION ECG - MUSE: NORMAL
LYMPHOCYTES # BLD AUTO: 1.7 10E3/UL (ref 0.8–5.3)
LYMPHOCYTES NFR BLD AUTO: 33 %
MAGNESIUM SERPL-MCNC: 1.6 MG/DL (ref 1.8–2.6)
MCH RBC QN AUTO: 31.5 PG (ref 26.5–33)
MCHC RBC AUTO-ENTMCNC: 33.5 G/DL (ref 31.5–36.5)
MCV RBC AUTO: 94 FL (ref 78–100)
MONOCYTES # BLD AUTO: 0.6 10E3/UL (ref 0–1.3)
MONOCYTES NFR BLD AUTO: 11 %
NEUTROPHILS # BLD AUTO: 2.8 10E3/UL (ref 1.6–8.3)
NEUTROPHILS NFR BLD AUTO: 52 %
NRBC # BLD AUTO: 0 10E3/UL
NRBC BLD AUTO-RTO: 0 /100
P AXIS - MUSE: NORMAL DEGREES
PLATELET # BLD AUTO: 315 10E3/UL (ref 150–450)
POTASSIUM BLD-SCNC: 3.9 MMOL/L (ref 3.5–5)
PR INTERVAL - MUSE: NORMAL MS
PROT SERPL-MCNC: 7.3 G/DL (ref 6–8)
QRS DURATION - MUSE: 98 MS
QT - MUSE: 398 MS
QTC - MUSE: 410 MS
R AXIS - MUSE: -6 DEGREES
RBC # BLD AUTO: 4.26 10E6/UL (ref 4.4–5.9)
SODIUM SERPL-SCNC: 138 MMOL/L (ref 136–145)
SYSTOLIC BLOOD PRESSURE - MUSE: NORMAL MMHG
T AXIS - MUSE: 39 DEGREES
TROPONIN I SERPL-MCNC: 0.01 NG/ML (ref 0–0.29)
VENTRICULAR RATE- MUSE: 64 BPM
WBC # BLD AUTO: 5.2 10E3/UL (ref 4–11)

## 2023-09-03 PROCEDURE — 99215 OFFICE O/P EST HI 40 MIN: CPT | Performed by: PHYSICIAN ASSISTANT

## 2023-09-03 PROCEDURE — 258N000003 HC RX IP 258 OP 636: Performed by: STUDENT IN AN ORGANIZED HEALTH CARE EDUCATION/TRAINING PROGRAM

## 2023-09-03 PROCEDURE — 83735 ASSAY OF MAGNESIUM: CPT | Performed by: STUDENT IN AN ORGANIZED HEALTH CARE EDUCATION/TRAINING PROGRAM

## 2023-09-03 PROCEDURE — 96375 TX/PRO/DX INJ NEW DRUG ADDON: CPT

## 2023-09-03 PROCEDURE — 93005 ELECTROCARDIOGRAM TRACING: CPT | Performed by: STUDENT IN AN ORGANIZED HEALTH CARE EDUCATION/TRAINING PROGRAM

## 2023-09-03 PROCEDURE — 80053 COMPREHEN METABOLIC PANEL: CPT | Performed by: STUDENT IN AN ORGANIZED HEALTH CARE EDUCATION/TRAINING PROGRAM

## 2023-09-03 PROCEDURE — 99284 EMERGENCY DEPT VISIT MOD MDM: CPT | Mod: 25

## 2023-09-03 PROCEDURE — 36415 COLL VENOUS BLD VENIPUNCTURE: CPT | Performed by: STUDENT IN AN ORGANIZED HEALTH CARE EDUCATION/TRAINING PROGRAM

## 2023-09-03 PROCEDURE — 96365 THER/PROPH/DIAG IV INF INIT: CPT

## 2023-09-03 PROCEDURE — 84484 ASSAY OF TROPONIN QUANT: CPT | Performed by: STUDENT IN AN ORGANIZED HEALTH CARE EDUCATION/TRAINING PROGRAM

## 2023-09-03 PROCEDURE — 93010 ELECTROCARDIOGRAM REPORT: CPT | Performed by: INTERNAL MEDICINE

## 2023-09-03 PROCEDURE — 93005 ELECTROCARDIOGRAM TRACING: CPT | Performed by: PHYSICIAN ASSISTANT

## 2023-09-03 PROCEDURE — 250N000011 HC RX IP 250 OP 636: Mod: JZ | Performed by: STUDENT IN AN ORGANIZED HEALTH CARE EDUCATION/TRAINING PROGRAM

## 2023-09-03 PROCEDURE — 96361 HYDRATE IV INFUSION ADD-ON: CPT

## 2023-09-03 PROCEDURE — 85025 COMPLETE CBC W/AUTO DIFF WBC: CPT | Performed by: STUDENT IN AN ORGANIZED HEALTH CARE EDUCATION/TRAINING PROGRAM

## 2023-09-03 RX ORDER — LORAZEPAM 1 MG/1
1 TABLET ORAL EVERY 6 HOURS PRN
Qty: 6 TABLET | Refills: 0 | Status: SHIPPED | OUTPATIENT
Start: 2023-09-03 | End: 2024-04-22

## 2023-09-03 RX ORDER — MAGNESIUM SULFATE HEPTAHYDRATE 40 MG/ML
2 INJECTION, SOLUTION INTRAVENOUS ONCE
Status: COMPLETED | OUTPATIENT
Start: 2023-09-03 | End: 2023-09-03

## 2023-09-03 RX ORDER — LORAZEPAM 2 MG/ML
1 INJECTION INTRAMUSCULAR ONCE
Status: COMPLETED | OUTPATIENT
Start: 2023-09-03 | End: 2023-09-03

## 2023-09-03 RX ADMIN — MAGNESIUM SULFATE HEPTAHYDRATE 2 G: 40 INJECTION, SOLUTION INTRAVENOUS at 17:31

## 2023-09-03 RX ADMIN — SODIUM CHLORIDE, POTASSIUM CHLORIDE, SODIUM LACTATE AND CALCIUM CHLORIDE 1000 ML: 600; 310; 30; 20 INJECTION, SOLUTION INTRAVENOUS at 16:33

## 2023-09-03 RX ADMIN — LORAZEPAM 1 MG: 2 INJECTION INTRAMUSCULAR; INTRAVENOUS at 16:53

## 2023-09-03 ASSESSMENT — ACTIVITIES OF DAILY LIVING (ADL)
ADLS_ACUITY_SCORE: 35
ADLS_ACUITY_SCORE: 35

## 2023-09-03 NOTE — ED PROVIDER NOTES
Emergency Department Encounter         FINAL IMPRESSION:  Dizziness        ED COURSE AND MEDICAL DECISION MAKING   4:28 PM I met with the patient to gather history and to perform my initial exam. We discussed plans for the ED course, including diagnostic testing and treatment.       ED Course as of 09/03/23 1737   Sun Sep 03, 2023   [id number] Patient is a 58-year-old male history of hypertension, hyperlipidemia, recent vertigo diagnosis after negative MRI as well as CTA in July.  Now is seeing physical therapy 1 time a week.  Was diagnosed with COVID 6 days ago.  States those symptoms have improved however his vertigo is now significantly worse.  States it is the same type of vertigo and symptomatology he has been experiencing since July however more intense.  States symptoms specifically get worse with change in position specifically laying down in bed.  States he has a whirling sensation and off-balance sensation.  States he gets nauseous with it.  No chest pain, trouble breathing, headache, neck pain or vision changes.  No dysarthria.  No upper or lower extremity weakness.  Apparently he presented to urgent care today then sent him here for abnormal neuro evaluation.  On arrival here his vitals are stable.  He looks well clinically.  Heart and lungs are normal.  His NIH is 0.  The urgent care thought patient had difficulty with heel-to-shin however here he is accurate.  Nose to finger is appropriate with no significant dysmetria.  He does have fatigable right beating nystagmus.  No rotational component.  No vertical component.  Plan for Ativan, fluids basic labs to rule out any other significant findings including NSTEMI and reevaluate.     -Patient feeling better after fluids, magnesium repletion as well as Ativan.  Symptom with few more tablets of Ativan to take before bedtime.  Recommended continued outpatient management with PT.      Medical Decision Making    History:  Supplemental history from: Documented in  chart, if applicable  External Record(s) reviewed: Documented in chart, if applicable.    Work Up:  Chart documentation includes differential considered and any EKGs or imaging independently interpreted by provider, where specified.  In additional to work up documented, I considered the following work up: Documented in chart, if applicable.    External consultation:  Discussion of management with another provider: Documented in chart, if applicable    Complicating factors:  Care impacted by chronic illness: Hypertension and Other: Gout, dyslipidemia, and obesity  Care affected by social determinants of health: N/A    Disposition considerations: Discharge. I prescribed additional prescription strength medication(s) as charted. See documentation for any additional details.            Critical Care     Performed by: Ander Malcolm or    Authorized by: Ander Malcolm  Total critical care time:  minutes  Critical care was necessary to treat or prevent imminent or life-threatening deterioration of the following conditions:   Critical care was time spent personally by me on the following activities: development of treatment plan with patient or surrogate, discussions with consultants, examination of patient, evaluation of patient's response to treatment, obtaining history from patient or surrogate, ordering and performing treatments and interventions, ordering and review of laboratory studies, ordering and review of radiographic studies, re-evaluation of patient's condition and monitoring for potential decompensation.  Critical care time was exclusive of separately billable procedures and treating other patients.'    At the conclusion of the encounter I discussed the results of all the tests and the disposition. The questions were answered. The patient or family acknowledged understanding and was agreeable with the care plan.        MEDICATIONS GIVEN IN THE EMERGENCY DEPARTMENT:  Medications   magnesium sulfate 2 g in 50 mL sterile  water intermittent infusion (2 g Intravenous $New Bag 9/3/23 1731)   lactated ringers BOLUS 1,000 mL (1,000 mLs Intravenous $New Bag 9/3/23 1633)   LORazepam (ATIVAN) injection 1 mg (1 mg Intravenous $Given 9/3/23 1653)       NEW PRESCRIPTIONS STARTED AT TODAY'S ED VISIT:  New Prescriptions    No medications on file       HPI     Patient information obtained from: Patient     Use of : N/A     Arthur Lawson is a 58 year old male with a pertinent medical history of HTN, anemia, gouty arthropathy, dyslipidemia, and obesity who presents to this ED via walk-in for evaluation of dizziness.    Patient endorses a recent vertigo diagnosis after undergoing a negative MRI and CTA in July 2023. He notes he is seeing physical therapy 1 time a week.  He reports that he was diagnosed with COVID 6 days ago, and states that while his COVID-19 associated  symptoms have improved, his vertigo is now significantly worse.  He notes it is the same type of vertigo and symptomatology he has was experiencing since July 2023, however, it is more intense. He mentions that his symptoms specifically get worse with changes in position, and specifically with laying down in bed. He endorses a a whirling sensation and off-balance sensation with associated nausea.   He denies any recent chest pain, trouble breathing, headache, neck pain, vision changes, dysarthria, upper or lower extremity weakness, or any other complications at this time.        REVIEW OF SYSTEMS:  Review of Systems   Constitutional: Negative for fever, malaise  HEENT: Negative runny nose, sore throat, ear pain, neck pain, visual disturbance.  Respiratory: Negative for shortness of breath, cough, congestion  Cardiovascular: Negative for chest pain, leg edema, dysarthria.   Gastrointestinal: Positive for nausea (secondary to dizziness). Negative for abdominal distention, abdominal pain, constipation, vomiting, diarrhea  Genitourinary: Negative for dysuria and hematuria.  "  Integument: Negative for rash, skin breakdown  Neurological: Positive for dizziness. Negative for paresthesias, weakness, headache.  Musculoskeletal: Negative for joint pain, joint swelling.       All other systems reviewed and are negative.      MEDICAL HISTORY     Past Medical History:   Diagnosis Date    Achilles bursitis or tendinitis     Anemia, unspecified     Gouty arthropathy, unspecified     Unspecified essential hypertension     Unspecified essential hypertension        Past Surgical History:   Procedure Laterality Date    FINGER SURGERY      INGUINAL HERNIA REPAIR      OTHER SURGICAL HISTORY      left knee arthroscopy    REFRACTIVE SURGERY      ZZC NONSPECIFIC PROCEDURE  1990    R inguinal herniorrhaphy       Social History     Tobacco Use    Smoking status: Never    Smokeless tobacco: Former     Types: Chew    Tobacco comments:     Chews.   Vaping Use    Vaping Use: Never used   Substance Use Topics    Alcohol use: Yes     Comment: Alcoholic Drinks/day: Socially    Drug use: No       amLODIPine (NORVASC) 5 MG tablet  lisinopril (ZESTRIL) 40 MG tablet  LORazepam (ATIVAN) 0.5 MG tablet  meclizine (ANTIVERT) 25 MG tablet  methylPREDNISolone (MEDROL DOSEPAK) 4 MG tablet therapy pack  nirmatrelvir and ritonavir (PAXLOVID) 300 mg/100 mg therapy pack  omeprazole (PRILOSEC) 20 MG DR capsule  ondansetron (ZOFRAN) 4 MG tablet            PHYSICAL EXAM     BP (!) 148/89   Pulse 59   Temp 97.5  F (36.4  C) (Oral)   Resp 18   Ht 1.803 m (5' 11\")   Wt 99.8 kg (220 lb)   SpO2 98%   BMI 30.68 kg/m        PHYSICAL EXAM:   General: Patient appears well, nontoxic, comfortable  HEENT: Moist mucous membranes,  No head trauma.    Cardiovascular: Normal rate, normal rhythm, no extremity edema.  No appreciable murmur.  Respiratory: No signs of respiratory distress, lungs are clear to auscultation bilaterally with no wheezes rhonchi or rales.  Abdominal: Soft, nontender, nondistended, no palpable masses, no guarding, no " rebound  Musculoskeletal: Full range of motion of joints, no deformities appreciated.  Neurological: Fatigable right beating nystagmus, but no rotational component, and no vertical component. Alert and oriented, grossly neurologically intact. Heel-to-shin is accurate. Nose to finger is appropriate with no significant dysmetria.  Psychological: Normal affect and mood.  Integument: No rashes appreciated    National [employer] Stroke Scale  Exam Interval: Baseline   Score    Level of consciousness: (0)   Alert, keenly responsive    LOC questions: (0)   Answers both questions correctly    LOC commands: (0)   Performs both tasks correctly    Best gaze: (0)   Normal    Visual: (0)   No visual loss    Facial palsy: (0)   Normal symmetrical movements    Motor arm (left): (0)   No drift    Motor arm (right): (0)   No drift    Motor leg (left): (0)   No drift    Motor leg (right): (0)   No drift    Limb ataxia: (0)   Absent    Sensory: (0)   Normal- no sensory loss    Best language: (0)   Normal- no aphasia    Dysarthria: (0)   Normal    Extinction and inattention: (0)   No abnormality        Total Score:  0           RESULTS     Labs Ordered and Resulted from Time of ED Arrival to Time of ED Departure   COMPREHENSIVE METABOLIC PANEL - Abnormal       Result Value    Sodium 138      Potassium 3.9      Chloride 102      Carbon Dioxide (CO2) 27      Anion Gap 9      Urea Nitrogen 11      Creatinine 1.00      Calcium 10.6 (*)     Glucose 94      Alkaline Phosphatase 59      AST 21      ALT 36      Protein Total 7.3      Albumin 4.2      Bilirubin Total 1.1 (*)     GFR Estimate 86     CBC WITH PLATELETS AND DIFFERENTIAL - Abnormal    WBC Count 5.2      RBC Count 4.26 (*)     Hemoglobin 13.4      Hematocrit 40.0      MCV 94      MCH 31.5      MCHC 33.5      RDW 14.5      Platelet Count 315      % Neutrophils 52      % Lymphocytes 33      % Monocytes 11      % Eosinophils 2      % Basophils 1      % Immature Granulocytes 1       NRBCs per 100 WBC 0      Absolute Neutrophils 2.8      Absolute Lymphocytes 1.7      Absolute Monocytes 0.6      Absolute Eosinophils 0.1      Absolute Basophils 0.0      Absolute Immature Granulocytes 0.1      Absolute NRBCs 0.0     MAGNESIUM - Abnormal    Magnesium 1.6 (*)    TROPONIN I - Normal    Troponin I 0.01         No orders to display           PROCEDURES:  Procedures:  Procedures       I, Sridhar Delgado am serving as a scribe to document services personally performed by Ander Malcolm DO, based on my observations and the provider's statements to me.    I, Ander Malcolm DO, attest that Sridhar Delgado is acting in a scribe capacity, has observed my performance of the services and has documented them in accordance with my direction.    Ander Malcolm DO  Emergency Medicine  [facility] EMERGENCY ROOM       Ander Malcolm DO  09/03/23 6140

## 2023-09-03 NOTE — ED TRIAGE NOTES
Patient presents to ED with dizziness since Wednesday, tested positive for Covid on Monday, is currently being treated with vestibular rehab for dizziness, but this dizziness is worse.  Cora Blakely RN.......9/3/2023 4:15 PM     Triage Assessment       Row Name 09/03/23 1613       Triage Assessment (Adult)    Airway WDL WDL       Respiratory WDL    Respiratory WDL WDL       Skin Circulation/Temperature WDL    Skin Circulation/Temperature WDL WDL       Cardiac WDL    Cardiac WDL WDL       Peripheral/Neurovascular WDL    Peripheral Neurovascular WDL WDL       Cognitive/Neuro/Behavioral WDL    Cognitive/Neuro/Behavioral WDL WDL

## 2023-09-03 NOTE — PROGRESS NOTES
Patient presents with:  Dizziness  Covid 19 Testing: Covid positive 6 days ago  Cough  Generalized Body Aches      Clinical Decision Making:  EKG shows normal sinus rhythm and did not show arrhythmia or other abnormality. Multiple etiologies and diagnoses were considered to include but not limited to CVA, TIA, DVT, PE.  Patient had elevated blood pressure, dizziness, focal neurologic findings with weakness and balance deficits on the left lower extremity and multiple cardiovascular risk factors to include essential hypertension and previous aneurysm of the internal carotid artery and recent infection with COVID-19 with hypercoagulable risk for DVT or PE.  Patient is sent to next higher level of care at the emergency room for definitive evaluation and treatment of his weakness dizziness and balance deficit on neuro exam.      ICD-10-CM    1. Dizziness  R42 EKG 12-lead, tracing only      2. Essential hypertension  I10       3. Aneurysm of internal carotid artery  I67.1           Patient Instructions   Present to the ER for evaluation and treatment.      HPI:  Arthur Lawson is a 58 year old male who presents today for evaluation of dizziness and lightheadedness.  Patient has had a history of dizziness in the past.  However, patient has had recent history of COVID.  Treatment with Paxlovid the patient is on antiviral day 5.  Patient has had increased fatigue he is having difficulty with dizziness lightheadedness and spinning especially when he lays down at nighttime with worsening of the dizziness symptoms with change of position.  Patient has not had heart palpitations, presyncope, syncope, pleuritic chest pain shortness of breath.  Patient has had difficulty with nausea but no overt vomiting.  He has not had fever.  He is micturating with 4 times already today.  He has not had difficulty with taking fluids with 2 cans of ginger ale and 24 ounces of water.    History obtained from chart review and the  patient.    Problem List:  2023-07: Aneurysm of internal carotid artery  2023-04: Mixed dyslipidemia  2023-04: Serum calcium elevated  2023-04: Hip pain  2022-06: Overweight and obesity  2022-05: Benign neoplasm of transverse colon  2022-05: Epigastric pain  2022-05: Nausea  2022-05: Polyp of colon  2022-03: Gout  2015-05: Delayed gastric emptying  2015-05: Indigestion  2015-04: Eosinophilic esophagitis  2015-03: Vitamin D deficiency  2014-01: Dizziness of unknown cause  2003-07: Essential hypertension      Past Medical History:   Diagnosis Date    Achilles bursitis or tendinitis     Created by Conversion     Anemia, unspecified     Created by Conversion     Gouty arthropathy, unspecified     Created by Conversion     Unspecified essential hypertension     Unspecified essential hypertension     Created by Conversion        Social History     Tobacco Use    Smoking status: Never    Smokeless tobacco: Former     Types: Chew    Tobacco comments:     Chews.   Substance Use Topics    Alcohol use: Yes     Comment: Alcoholic Drinks/day: Socially       Review of Systems  As above in HPI otherwise negative.    Vitals:    09/03/23 1438   BP: 119/83   Pulse: 78   Resp: 19   Temp: 98  F (36.7  C)   SpO2: 96%       General: Patient is resting comfortably no acute distress is afebrile  HEENT: Head is normocephalic atraumatic   eyes are PERRL EOMI sclera anicteric   TMs are clear bilaterally  Throat is with mild pharyngeal wall erythema and no exudate  No cervical lymphadenopathy present  LUNGS: Clear to auscultation bilaterally  HEART: Regular rate and rhythm  Skin: Without rash non-diaphoretic  Neuro:  Patient has had focal findings with inability to balance on the left lower leg and failed heel-to-shin testing.  Finger-to-nose testing is intact.  Romberg is negative.  There is both direct and consensual stimulation to light with no noted nystagmus.    Physical Exam    LAB:  Results for orders placed or performed during the  hospital encounter of 09/03/23   CBC with platelets differential     Status: None ()    Narrative    The following orders were created for panel order CBC with platelets differential.  Procedure                               Abnormality         Status                     ---------                               -----------         ------                     CBC with platelets and d...[[id number]]                                                   Please view results for these tests on the individual orders.   Results for orders placed or performed in visit on 09/03/23   EKG 12-lead, tracing only     Status: None   Result Value Ref Range    Systolic Blood Pressure  mmHg    Diastolic Blood Pressure  mmHg    Ventricular Rate 64 BPM    Atrial Rate 64 BPM    DE Interval  ms    QRS Duration 98 ms     ms    QTc 410 ms    P Axis  degrees    R AXIS -6 degrees    T Axis 39 degrees    Interpretation ECG       Sinus rhythm  Minimal voltage criteria for LVH, may be normal variant  Borderline ECG  When compared with ECG of 02-JUL-2023 13:54,  DE interval has decreased  Confirmed by FRANKLIN LANDRUM MD LOC:[facility] ([id number]) on 9/3/2023 3:53:18 PM         At the end of the encounter, I discussed results, diagnosis, medications. Discussed red flags for immediate return to clinic/ER, as well as indications for follow up if no improvement. Patient understood and agreed to plan. Patient was stable for discharge.

## 2023-09-03 NOTE — Clinical Note
Arthur Lawson was seen and treated in our emergency department on 9/3/2023.  He may return to work on 09/07/2023.       If you have any questions or concerns, please don't hesitate to call.      Ohl, Ander Anderson, DO

## 2023-09-06 ENCOUNTER — NURSE TRIAGE (OUTPATIENT)
Dept: INTERNAL MEDICINE | Facility: CLINIC | Age: 58
End: 2023-09-06
Payer: COMMERCIAL

## 2023-09-06 NOTE — TELEPHONE ENCOUNTER
Relayed Dr. Harrison's message. Pt states understanding, does not want to make an appointment at this time, will monitor symptoms and call if he feels he needs an appointment

## 2023-09-06 NOTE — TELEPHONE ENCOUNTER
COVID + Monday  Virtual Tuesday [place] - antiviral med prescribed.  Sx resolved besides worsening dizziness and nausea  Urgent Care Sunday for this, sent to ER -   Bolus 1,000 mL LR  Magnesium lab 1.6  Magnesium sulfate 2g in 50mL SW intermittent infusion  Prescribed Lorazepam did not seem to work  BP yesterday 119/83  BS 94  Apt tomorrow at 0700 Vestibular tx with Abigail Pearson    Reason for Disposition   Lightheadedness (dizziness) present now, after 2 hours of rest and fluids    Additional Information   Negative: SEVERE difficulty breathing (e.g., struggling for each breath, speaks in single words)   Negative: Shock suspected (e.g., cold/pale/clammy skin, too weak to stand, low BP, rapid pulse)   Negative: Difficult to awaken or acting confused (e.g., disoriented, slurred speech)   Negative: Fainted, and still feels dizzy afterwards   Negative: Overdose (accidental or intentional) of medications   Negative: New neurologic deficit that is present now: * Weakness of the face, arm, or leg on one side of the body * Numbness of the face, arm, or leg on one side of the body * Loss of speech or garbled speech   Negative: Heart beating < 50 beats per minute OR > 140 beats per minute   Negative: Sounds like a life-threatening emergency to the triager   Negative: Chest pain   Negative: Rectal bleeding, bloody stool, or tarry-black stool   Negative: Vomiting is main symptom   Negative: Diarrhea is main symptom   Negative: SEVERE dizziness (e.g., unable to stand, requires support to walk, feels like passing out now)   Negative: SEVERE headache or neck pain   Negative: Spinning or tilting sensation (vertigo) present now and one or more stroke risk factors (i.e., hypertension, diabetes mellitus, prior stroke/TIA, heart attack, age over 60) (Exception: Prior physician evaluation for this AND no different/worse than usual.)   Negative: Neurologic deficit that was brief (now gone), ANY of the following:* Weakness of the face,  arm, or leg on one side of the body* Numbness of the face, arm, or leg on one side of the body* Loss of speech or garbled speech   Negative: Loss of vision or double vision  (Exception: Similar to previous migraines.)   Negative: Extra heartbeats, irregular heart beating, or heart is beating very fast (i.e., 'palpitations')   Negative: Difficulty breathing   Negative: Drinking very little and dehydration suspected (e.g., no urine > 12 hours, very dry mouth, very lightheaded)   Negative: Follows bleeding (e.g., stomach, rectum, vagina)  (Exception: Became dizzy from sight of small amount blood.)   Negative: Patient sounds very sick or weak to the triager    Protocols used: Dizziness-A-OH

## 2023-09-06 NOTE — TELEPHONE ENCOUNTER
No additional recommendations, but he might want to have a clinic visit in the next week or so, if he feels that recovery seems prolonged.

## 2023-09-07 ENCOUNTER — THERAPY VISIT (OUTPATIENT)
Dept: OCCUPATIONAL THERAPY | Facility: REHABILITATION | Age: 58
End: 2023-09-07
Payer: COMMERCIAL

## 2023-09-07 DIAGNOSIS — R42 DIZZINESS: Primary | ICD-10-CM

## 2023-09-07 DIAGNOSIS — Z78.9 DECREASED ACTIVITIES OF DAILY LIVING (ADL): ICD-10-CM

## 2023-09-07 DIAGNOSIS — R26.81 UNSTEADINESS: ICD-10-CM

## 2023-09-07 PROCEDURE — 97112 NEUROMUSCULAR REEDUCATION: CPT | Mod: GO | Performed by: OCCUPATIONAL THERAPIST

## 2023-09-07 PROCEDURE — 95992 CANALITH REPOSITIONING PROC: CPT | Mod: GO | Performed by: OCCUPATIONAL THERAPIST

## 2023-09-12 ENCOUNTER — THERAPY VISIT (OUTPATIENT)
Dept: OCCUPATIONAL THERAPY | Facility: REHABILITATION | Age: 58
End: 2023-09-12
Payer: COMMERCIAL

## 2023-09-12 DIAGNOSIS — Z78.9 DECREASED ACTIVITIES OF DAILY LIVING (ADL): ICD-10-CM

## 2023-09-12 DIAGNOSIS — R26.81 UNSTEADINESS: ICD-10-CM

## 2023-09-12 DIAGNOSIS — R42 DIZZINESS: Primary | ICD-10-CM

## 2023-09-12 PROCEDURE — 97112 NEUROMUSCULAR REEDUCATION: CPT | Mod: GO | Performed by: OCCUPATIONAL THERAPIST

## 2023-09-23 ENCOUNTER — HOSPITAL ENCOUNTER (EMERGENCY)
Facility: CLINIC | Age: 58
Discharge: HOME | End: 2023-09-23
Attending: EMERGENCY MEDICINE | Admitting: EMERGENCY MEDICINE
Payer: COMMERCIAL

## 2023-09-23 ENCOUNTER — APPOINTMENT (OUTPATIENT)
Dept: RADIOLOGY | Facility: CLINIC | Age: 58
End: 2023-09-23
Attending: EMERGENCY MEDICINE
Payer: COMMERCIAL

## 2023-09-23 VITALS
HEIGHT: 71 IN | WEIGHT: 210 LBS | RESPIRATION RATE: 18 BRPM | OXYGEN SATURATION: 100 % | BODY MASS INDEX: 29.4 KG/M2 | DIASTOLIC BLOOD PRESSURE: 104 MMHG | SYSTOLIC BLOOD PRESSURE: 138 MMHG | HEART RATE: 57 BPM | TEMPERATURE: 97.6 F

## 2023-09-23 DIAGNOSIS — R09.A2 SENSATION OF FOREIGN BODY IN THROAT: ICD-10-CM

## 2023-09-23 PROCEDURE — 70360 X-RAY EXAM OF NECK: CPT

## 2023-09-23 PROCEDURE — 99283 EMERGENCY DEPT VISIT LOW MDM: CPT

## 2023-09-23 RX ORDER — HYDROCODONE BITARTRATE AND ACETAMINOPHEN 7.5; 325 MG/15ML; MG/15ML
10 SOLUTION ORAL 4 TIMES DAILY PRN
Qty: 160 ML | Refills: 0 | Status: SHIPPED | OUTPATIENT
Start: 2023-09-23 | End: 2023-10-17

## 2023-09-23 ASSESSMENT — ACTIVITIES OF DAILY LIVING (ADL): ADLS_ACUITY_SCORE: 35

## 2023-09-23 ASSESSMENT — ENCOUNTER SYMPTOMS: SORE THROAT: 1

## 2023-09-23 NOTE — ED PROVIDER NOTES
EMERGENCY DEPARTMENT ENCOUNTER      NAME: Arthur Lawson  AGE: 58 year old male  YOB: 1965  MRN: [id number]  EVALUATION DATE & TIME: 2023  9:23 AM    PCP: Jai Garvin    ED PROVIDER: Adan Appiah M.D.      Chief Complaint   Patient presents with    Swallowed Foreign Body         FINAL IMPRESSION:  1.  Acute throat foreign body sensation.      ED COURSE & MEDICAL DECISION MAKIN:38 PM I met with the patient to gather history and to perform my initial exam. We discussed plans for the ED course, including diagnostic testing and treatment. PPE worn: cloth mask.  Patient notes a foreign body sensations at the base of the right throat near the external inlet.  He notes it is a sharp pain he has not been able to get rid of it.  Able to swallow water and saliva okay.  Previous visit here on  where he thought he swallowed a possibly partial plate.  No airway difficulty.    11:07 AM Patient rechecked and updated on imaging results.  Patient again points to the base of the neck on the right.  It is above the sternal notch.  Therefore we will discuss this with the ENT and see if they can come in and see the patient or if I can send the patient to their clinic.  Patient in agreement with this plan.  Dr. Vela from ENT called back and his partner will see him on Tuesday.  Appointment information given the patient.  Patient in agreement with the plan.    Pertinent Labs & Imaging studies reviewed. (See chart for details)  58 year old male presents to the Emergency Department for evaluation of foreign body sensation in the throat.    At the conclusion of the encounter I discussed the results of all of the tests and the disposition. The questions were answered. The patient or family acknowledged understanding and was agreeable with the care plan.              Medical Decision Making    History:  Supplemental history from: Documented in chart, if applicable  External Record(s) reviewed:  Both inpatient and outpatient [facility] records reviewed.    Work Up:  Chart documentation includes differential considered and any EKGs or imaging independently interpreted by provider, where specified.  Differential diagnosis includes oropharyngeal scratch, foreign body, etc.  In additional to work up documented, I considered the following work up: Documented in chart, if applicable.    External consultation:  Discussion of management with another provider: Documented in chart, if applicable    Complicating factors:  Care impacted by chronic illness: Hyperlipidemia and Hypertension  Care affected by social determinants of health: N/A    Disposition considerations: Anticipate possible discharge home.          MEDICATIONS GIVEN IN THE EMERGENCY:  Medications - No data to display    NEW PRESCRIPTIONS STARTED AT TODAY'S ER VISIT  New Prescriptions    No medications on file          =================================================================    HPI    Patient information was obtained from: Patient and Nurse triage note    Use of : N/A      Arthur Lawson is a 58 year old male with a pertinent history of hypertension and hyperlipidemia who presents to this ED  for evaluation of a swallowed foreign body.    Per chart review, the patient had been seen here on 8/06/23 (~2 months ago) for evaluation of a swallowed foreign body and epigastric abdominal pain. He had been taking pills and thought that he had partially swallowed his plastic partial denture. Neck xray showed no radiopaque foreign body and abdomen xray showed no obstruction or free air. Xray chest was also normal. Patient was discharged home with indications to return.    Patient reports that he has had sharp throat pain and a globus sensation starting today (9/23/23) this morning. He is  not able to clear his throat but he is able to swallow water, per nurse triage note. He does not recall eating anything sharp or swallowing anything that could  potentially cause his discomfort.    He denied any abdominal pain. He does not identify any waxing or waning symptoms otherwise, exacerbating or alleviating features, associated symptoms except as mentioned.     REVIEW OF SYSTEMS   Review of Systems   HENT:  Positive for sore throat (sharp).         Endorses globus sensation.   All other systems reviewed and are negative.    PAST MEDICAL HISTORY:  Past Medical History:   Diagnosis Date    Achilles bursitis or tendinitis     Created by Conversion     Anemia, unspecified     Created by Conversion     Gouty arthropathy, unspecified     Created by Conversion     Unspecified essential hypertension     Unspecified essential hypertension     Created by Conversion        PAST SURGICAL HISTORY:  Past Surgical History:   Procedure Laterality Date    FINGER SURGERY      INGUINAL HERNIA REPAIR      OTHER SURGICAL HISTORY      left knee arthroscopy    REFRACTIVE SURGERY      ZZC NONSPECIFIC PROCEDURE  1990    R inguinal herniorrhaphy           CURRENT MEDICATIONS:    amLODIPine (NORVASC) 5 MG tablet  lisinopril (ZESTRIL) 40 MG tablet  LORazepam (ATIVAN) 0.5 MG tablet  LORazepam (ATIVAN) 1 MG tablet  meclizine (ANTIVERT) 25 MG tablet  methylPREDNISolone (MEDROL DOSEPAK) 4 MG tablet therapy pack  omeprazole (PRILOSEC) 20 MG DR capsule  ondansetron (ZOFRAN) 4 MG tablet        ALLERGIES:  Allergies   Allergen Reactions    Penicillins      policillin       FAMILY HISTORY:  Family History   Problem Relation Age of Onset    Colon Cancer Father     Retinal detachment Father     Pacemaker Father     Retinal detachment Paternal Grandfather        SOCIAL HISTORY:   Social History     Socioeconomic History    Marital status:      Spouse name: None    Number of children: None    Years of education: None    Highest education level: None   Tobacco Use    Smoking status: Never    Smokeless tobacco: Former     Types: Chew    Tobacco comments:     Chews.   Vaping Use    Vaping Use: Never  "used   Substance and Sexual Activity    Alcohol use: Yes     Comment: Alcoholic Drinks/day: Socially    Drug use: No   Drinks alcohol.  No tobacco or drugs.    VITALS:  BP (!) 186/105   Pulse 72   Temp 97.6  F (36.4  C) (Temporal)   Resp 18   Ht 1.803 m (5' 11\")   Wt 95.3 kg (210 lb)   SpO2 98%   BMI 29.29 kg/m      PHYSICAL EXAM    Vital Signs:  BP (!) 186/105   Pulse 72   Temp 97.6  F (36.4  C) (Temporal)   Resp 18   Ht 1.803 m (5' 11\")   Wt 95.3 kg (210 lb)   SpO2 98%   BMI 29.29 kg/m    General:  On entering the room he is in no apparent distress.    Neck:  Neck supple with full range of motion and nontender.    Back:  Back and spine are nontender.  No costovertebral angle tenderness.    HEENT:  Oropharynx clear with moist mucous membranes.  HEENT unremarkable.    Pulmonary:  Chest clear to auscultation without rhonchi rales or wheezing.  No stridor.  No visible oropharyngeal foreign body.  Cardiovascular:  Cardiac regular rate and rhythm without murmurs rubs or gallops.    Abdomen:  Abdomen soft nontender.  There is no rebound or guarding.    Muskuloskeletal:  He moves all 4 without any difficulty and has normal neurovascular exams.  Extremities without clubbing, cyanosis, or edema.  Legs and calves are nontender.    Neuro:  He is alert and oriented ×3 and moves all extremities symmetrically.    Psych:  Normal affect.    Skin:  Unremarkable and warm and dry.       LAB:  All pertinent labs reviewed and interpreted.  Labs Ordered and Resulted from Time of ED Arrival to Time of ED Departure - No data to display    RADIOLOGY:  Reviewed all pertinent imaging. Please see official radiology report.  Neck soft tissue XR   Final Result   IMPRESSION: No prevertebral edema. Normal appearance of the epiglottis and larynx. No radiopaque foreign body. No airway compromise.                 EKG:     PROCEDURES:         Alexander GOMEZ am serving as a scribe to document services personally performed by Dr." Bijal based on my observation and the provider's statements to me. I, Adan Appiah MD attest that Alexander Johns  is acting in a scribe capacity, has observed my performance of the services and has documented them in accordance with my direction.    Adan Appiah M.D.  Emergency Medicine  [facility] EMERGENCY ROOM  [address] [address]  [phone]  Dept: [phone]       Adan Appiah MD  09/23/23 1113       Adan Appiah MD  09/23/23 1805

## 2023-09-23 NOTE — ED TRIAGE NOTES
Patient states he woke up with sharp pain in throat. Unable to clear throat. Patient states that he thinks he swallowed his partial denture. Able to swallow water.     Patient was supposed to have a detailed CT scan at 0930 today from ENT for dizziness.      Triage Assessment       Row Name 09/23/23 0910       Triage Assessment (Adult)    Airway WDL WDL       Respiratory WDL    Respiratory WDL WDL       Skin Circulation/Temperature WDL    Skin Circulation/Temperature WDL WDL       Cardiac WDL    Cardiac WDL WDL       Peripheral/Neurovascular WDL    Peripheral Neurovascular WDL WDL       Cognitive/Neuro/Behavioral WDL    Cognitive/Neuro/Behavioral WDL WDL

## 2023-09-23 NOTE — DISCHARGE INSTRUCTIONS
Encourage fluids.  Liquid diet.  Tylenol or ibuprofen every 6 hours can help with pain sensation.  Alternatively hydrocodone elixir 10 mL every 6 hours as needed.  Do not drive with this.  The ENT doctor I called today made an appointment with for you with Dr. Ramón Copeland on Tuesday at [facility] which is adjacent to [facility] on the [place] side.  Appointment at 2:40 PM.  They would like you there at 2:25 PM.  Their phone number is [phone].

## 2023-09-25 ENCOUNTER — TELEPHONE (OUTPATIENT)
Dept: OTOLARYNGOLOGY | Facility: CLINIC | Age: 58
End: 2023-09-25
Payer: COMMERCIAL

## 2023-09-25 DIAGNOSIS — R09.A2 GLOBUS SENSATION: Primary | ICD-10-CM

## 2023-09-25 NOTE — TELEPHONE ENCOUNTER
Spoke to pt and in informed him per NB that he needs to see Gen Surg and that a referral would be placed today. Also informed pt that someone would be reaching out to schedule that appt PT understood this new plan and was agreeable.   Referral placed today.      ABILIO ESTEVEZ

## 2023-09-27 ENCOUNTER — OFFICE VISIT (OUTPATIENT)
Dept: SURGERY | Facility: CLINIC | Age: 58
End: 2023-09-27
Payer: COMMERCIAL

## 2023-09-27 DIAGNOSIS — R09.A2 GLOBUS SENSATION: Primary | ICD-10-CM

## 2023-09-27 PROCEDURE — 99203 OFFICE O/P NEW LOW 30 MIN: CPT | Performed by: SURGERY

## 2023-09-27 RX ORDER — ONDANSETRON 4 MG/1
TABLET, ORALLY DISINTEGRATING ORAL
COMMUNITY
Start: 2023-09-14 | End: 2023-10-17

## 2023-09-27 NOTE — PROGRESS NOTES
HPI: Arthur Lawson is a 58 year old male referred to see me by Jai Garvin for evaluation of gastroesophageal reflux disease.  He notes  a several week history of progressively worsening dizziness with dysphagia.  He does have a sharp discomfort in the oral pharyngeal space with phlegm but denies any significant GERD symptoms.  Denies any heartburn, nighttime cough but does sleep propped up.        Allergies:Penicillins    Past Medical History:   Diagnosis Date    Achilles bursitis or tendinitis     Created by Conversion     Anemia, unspecified     Created by Conversion     Gouty arthropathy, unspecified     Created by Conversion     Unspecified essential hypertension     Unspecified essential hypertension     Created by Conversion        Past Surgical History:   Procedure Laterality Date    FINGER SURGERY      INGUINAL HERNIA REPAIR      OTHER SURGICAL HISTORY      left knee arthroscopy    REFRACTIVE SURGERY      ZZC NONSPECIFIC PROCEDURE  1990    R inguinal herniorrhaphy       CURRENT MEDS:    Current Outpatient Medications:     amLODIPine (NORVASC) 5 MG tablet, Take 1 tablet (5 mg) by mouth daily, Disp: 90 tablet, Rfl: 0    HYDROcodone-acetaminophen 7.5-325 MG/15ML solution, Take 10 mLs by mouth 4 times daily as needed for severe pain, Disp: 160 mL, Rfl: 0    lisinopril (ZESTRIL) 40 MG tablet, Take 1 tablet (40 mg) by mouth daily, Disp: 90 tablet, Rfl: 3    LORazepam (ATIVAN) 0.5 MG tablet, Take 1 tablet (0.5 mg) by mouth every 6 hours as needed (dizziness), Disp: 8 tablet, Rfl: 0    LORazepam (ATIVAN) 1 MG tablet, Take 1 tablet (1 mg) by mouth every 6 hours as needed for vomiting or nausea (vertigo), Disp: 6 tablet, Rfl: 0    meclizine (ANTIVERT) 25 MG tablet, Take 1 tablet (25 mg) by mouth 3 times daily as needed for dizziness, Disp: 20 tablet, Rfl: 0    methylPREDNISolone (MEDROL DOSEPAK) 4 MG tablet therapy pack, TAKE 6 TABLETS ON DAY 1 AS DIRECTED ON PACKAGE AND DECREASE BY 1 TAB EACH DAY FOR A  TOTAL OF 6 DAYS, Disp: , Rfl:     omeprazole (PRILOSEC) 20 MG DR capsule, Take 20 mg by mouth daily, Disp: , Rfl:     ondansetron (ZOFRAN ODT) 4 MG ODT tab, TAKE 0.5 TO 2 TABLETS EVERY 6 HOURS AS NEEDED FOR NAUSEA, Disp: , Rfl:     ondansetron (ZOFRAN) 4 MG tablet, Take 1 tablet (4 mg) by mouth every 8 hours as needed for nausea, Disp: 90 tablet, Rfl: 0    Family History   Problem Relation Age of Onset    Colon Cancer Father     Retinal detachment Father     Pacemaker Father     Retinal detachment Paternal Grandfather         reports that he has never smoked. He has quit using smokeless tobacco.  His smokeless tobacco use included chew. He reports current alcohol use. He reports that he does not use drugs.    Review of Systems:  The 12 system review is within normal limits except for as mentioned above.  General ROS: No complaints or constitutional symptoms  Ophthalmic ROS: No complaints of visual changes  Skin: No complaints or symptoms   Endocrine: No complaints or symptoms  Hematologic/Lymphatic: No symptoms or complaints  Psychiatric: No symptoms or complaints  Respiratory ROS: no cough, shortness of breath, or wheezing  Cardiovascular ROS: no chest pain or dyspnea on exertion  Gastrointestinal ROS: As per HPI  Genito-Urinary ROS: no dysuria, trouble voiding, or hematuria  Musculoskeletal ROS: no joint or muscle pain  Neurological ROS: no TIA or stroke symptoms      EXAM:  There were no vitals taken for this visit.  GENERAL: Well developed male, No acute distress, pleasant and conversant   EYES: Pupils equal, round and reactive, no scleral icterus  ABDOMEN: Soft, non-tender, no masses, non-distended  SKIN: Pink, warm and dry, no obvious rashes or lesions   NEURO:No focal deficits, ambulatory  MUSCULOSKELETAL:No obvious deformities, no swelling, normal appearing      LABS:  Lab Results   Component Value Date    WBC 5.2 09/03/2023    WBC 6.4 10/01/2003    HGB 13.4 09/03/2023    HGB 14.0 10/01/2003    HCT 40.0  09/03/2023    HCT 40.3 10/01/2003    MCV 94 09/03/2023    MCV 82 10/01/2003     09/03/2023     10/01/2003     INR/Prothrombin Time  @LABRCNTIP(NA,K,CL,co2,bun,creatinine,labglom,glucose,calcium)@  Lab Results   Component Value Date    ALT 36 09/03/2023    AST 21 09/03/2023    ALKPHOS 59 09/03/2023         Assessment/Plan:   Arthur Lawson is a 58 year old male with signs and symptoms consistent with dizziness and oral pharyngeal discomfort of an unclear etiology.  I have explained the pathophysiology of GERD in detail as well as the surgical versus non-operative management strategies.  Whether or not he has occult reflux which is contributing to his symptomatology is unclear.      At this point we will proceed with continued initial work-up.  This will include an esophagram and a phone follow-up to discuss results.    Gagan Blair,  [facility]  [phone]  [facility] Department of Surgery

## 2023-09-27 NOTE — LETTER
9/27/2023         RE: Arthur Lawson  [address] [place] [address]        Dear Colleague,    Thank you for referring your patient, Arthur Lawson, to the [facility] [place]. Please see a copy of my visit note below.    HPI: Arthur Lawson is a 58 year old male referred to see me by Jai Garvin for evaluation of gastroesophageal reflux disease.  He notes  a several week history of progressively worsening dizziness with dysphagia.  He does have a sharp discomfort in the oral pharyngeal space with phlegm but denies any significant GERD symptoms.  Denies any heartburn, nighttime cough but does sleep propped up.        Allergies:Penicillins    Past Medical History:   Diagnosis Date     Achilles bursitis or tendinitis     Created by Conversion      Anemia, unspecified     Created by Conversion      Gouty arthropathy, unspecified     Created by Conversion      Unspecified essential hypertension      Unspecified essential hypertension     Created by Conversion        Past Surgical History:   Procedure Laterality Date     FINGER SURGERY       INGUINAL HERNIA REPAIR       OTHER SURGICAL HISTORY      left knee arthroscopy     REFRACTIVE SURGERY       ZZC NONSPECIFIC PROCEDURE  1990    R inguinal herniorrhaphy       CURRENT MEDS:    Current Outpatient Medications:      amLODIPine (NORVASC) 5 MG tablet, Take 1 tablet (5 mg) by mouth daily, Disp: 90 tablet, Rfl: 0     HYDROcodone-acetaminophen 7.5-325 MG/15ML solution, Take 10 mLs by mouth 4 times daily as needed for severe pain, Disp: 160 mL, Rfl: 0     lisinopril (ZESTRIL) 40 MG tablet, Take 1 tablet (40 mg) by mouth daily, Disp: 90 tablet, Rfl: 3     LORazepam (ATIVAN) 0.5 MG tablet, Take 1 tablet (0.5 mg) by mouth every 6 hours as needed (dizziness), Disp: 8 tablet, Rfl: 0     LORazepam (ATIVAN) 1 MG tablet, Take 1 tablet (1 mg) by mouth every 6 hours as needed for vomiting or nausea (vertigo), Disp: 6  tablet, Rfl: 0     meclizine (ANTIVERT) 25 MG tablet, Take 1 tablet (25 mg) by mouth 3 times daily as needed for dizziness, Disp: 20 tablet, Rfl: 0     methylPREDNISolone (MEDROL DOSEPAK) 4 MG tablet therapy pack, TAKE 6 TABLETS ON DAY 1 AS DIRECTED ON PACKAGE AND DECREASE BY 1 TAB EACH DAY FOR A TOTAL OF 6 DAYS, Disp: , Rfl:      omeprazole (PRILOSEC) 20 MG DR capsule, Take 20 mg by mouth daily, Disp: , Rfl:      ondansetron (ZOFRAN ODT) 4 MG ODT tab, TAKE 0.5 TO 2 TABLETS EVERY 6 HOURS AS NEEDED FOR NAUSEA, Disp: , Rfl:      ondansetron (ZOFRAN) 4 MG tablet, Take 1 tablet (4 mg) by mouth every 8 hours as needed for nausea, Disp: 90 tablet, Rfl: 0    Family History   Problem Relation Age of Onset     Colon Cancer Father      Retinal detachment Father      Pacemaker Father      Retinal detachment Paternal Grandfather         reports that he has never smoked. He has quit using smokeless tobacco.  His smokeless tobacco use included chew. He reports current alcohol use. He reports that he does not use drugs.    Review of Systems:  The 12 system review is within normal limits except for as mentioned above.  General ROS: No complaints or constitutional symptoms  Ophthalmic ROS: No complaints of visual changes  Skin: No complaints or symptoms   Endocrine: No complaints or symptoms  Hematologic/Lymphatic: No symptoms or complaints  Psychiatric: No symptoms or complaints  Respiratory ROS: no cough, shortness of breath, or wheezing  Cardiovascular ROS: no chest pain or dyspnea on exertion  Gastrointestinal ROS: As per HPI  Genito-Urinary ROS: no dysuria, trouble voiding, or hematuria  Musculoskeletal ROS: no joint or muscle pain  Neurological ROS: no TIA or stroke symptoms      EXAM:  There were no vitals taken for this visit.  GENERAL: Well developed male, No acute distress, pleasant and conversant   EYES: Pupils equal, round and reactive, no scleral icterus  ABDOMEN: Soft, non-tender, no masses, non-distended  SKIN: Pink,  warm and dry, no obvious rashes or lesions   NEURO:No focal deficits, ambulatory  MUSCULOSKELETAL:No obvious deformities, no swelling, normal appearing      LABS:  Lab Results   Component Value Date    WBC 5.2 09/03/2023    WBC 6.4 10/01/2003    HGB 13.4 09/03/2023    HGB 14.0 10/01/2003    HCT 40.0 09/03/2023    HCT 40.3 10/01/2003    MCV 94 09/03/2023    MCV 82 10/01/2003     09/03/2023     10/01/2003     INR/Prothrombin Time  @LABRCNTIP(NA,K,CL,co2,bun,creatinine,labglom,glucose,calcium)@  Lab Results   Component Value Date    ALT 36 09/03/2023    AST 21 09/03/2023    ALKPHOS 59 09/03/2023         Assessment/Plan:   Arthur Lawson is a 58 year old male with signs and symptoms consistent with dizziness and oral pharyngeal discomfort of an unclear etiology.  I have explained the pathophysiology of GERD in detail as well as the surgical versus non-operative management strategies.  Whether or not he has occult reflux which is contributing to his symptomatology is unclear.      At this point we will proceed with continued initial work-up.  This will include an esophagram and a phone follow-up to discuss results.    Gagan Blair DO [facility]  [phone]  [facility] Department of Surgery      Again, thank you for allowing me to participate in the care of your patient.        Sincerely,        Gagan Blair DO

## 2023-10-04 ENCOUNTER — PREP FOR PROCEDURE (OUTPATIENT)
Dept: SURGERY | Facility: CLINIC | Age: 58
End: 2023-10-04
Payer: COMMERCIAL

## 2023-10-04 DIAGNOSIS — K21.9 GASTROESOPHAGEAL REFLUX DISEASE WITHOUT ESOPHAGITIS: Primary | ICD-10-CM

## 2023-10-05 ENCOUNTER — TRANSFERRED RECORDS (OUTPATIENT)
Dept: HEALTH INFORMATION MANAGEMENT | Facility: CLINIC | Age: 58
End: 2023-10-05
Payer: COMMERCIAL

## 2023-10-08 DIAGNOSIS — I10 ESSENTIAL HYPERTENSION: ICD-10-CM

## 2023-10-09 RX ORDER — AMLODIPINE BESYLATE 5 MG/1
5 TABLET ORAL DAILY
Qty: 90 TABLET | Refills: 0 | Status: SHIPPED | OUTPATIENT
Start: 2023-10-09 | End: 2024-02-01

## 2023-10-10 NOTE — PROGRESS NOTES
DISCHARGE  Reason for Discharge: No further expectation of progress.    Equipment Issued: none    Discharge Plan: Continue to follow up with PCP    Referring Provider:  Jai Garvin, CNP       09/12/23 0500   Appointment Info   Treating Provider Heather Pearson OTR/L   Visits Used 4   Medical Diagnosis dizziness   OT Tx Diagnosis dizziness, unsteadiness   Progress Note/Certification   Onset of Illness/Injury or Date of Surgery 07/02/23   Therapy Frequency once a week   Predicted Duration 12 weeks   Progress Note Due Date 10/02/23   OT Goal 1   Goal Description Patient will be able to turn head without dizziness   Target Date 10/26/23   OT Goal 2   Goal Description Patient will be able to walk with head motion without dizziness or unsteadiness   Target Date 10/26/23   Subjective Report   Subjective Report Patient reports that dizziness continues to be severe. No change after last treatment   Neuromuscular Re-education   Neuromuscular Re-ed Minutes (92088) 10   Neuro Re-ed 1 vestibular ex's   Neuro Re-ed 1 - Details He reports significant dizziness, nausea and unsteadiness. He notes vertigo with bed mobility can be intense. No hearing changes.Patient to hold vestibular adaptation and substitution exercise including horizontal X1 viewing-5 seconds, horizontal targets- 60 seconds and NSEC.   Skilled Intervention vestibular adaptation and substitution exercises   Infrared Goggle Exam or Frenzel Lense Exam   Vestibular Suppressant in Last 24 Hours? Yes   Exam completed with Room Light   [facility] Supine Roll Test (Left) Negative   [facility] Supine Roll Test (Left) Comments  very nauseated   Other Infrared Goggle Exam or Frenzel Lense Exam Comments Patient unable to tolerate additional testing today due to severe nausea.   Education   Learner/Method Patient   Plan   Plan for next session Hold OT. Email sent to Reyna Thao PA-C(ENT) with status and goggle results. Patient to hold HEP until ENT visit.   Comments    Comments Patient scheduled to see ENT on 9-14-23. Message sent to ENT regarding patient's symptoms   Total Session Time   Timed Code Treatment Minutes 10   Total Treatment Time (sum of timed and untimed services) 10

## 2023-10-11 ENCOUNTER — TELEPHONE (OUTPATIENT)
Dept: SURGERY | Facility: CLINIC | Age: 58
End: 2023-10-11

## 2023-10-11 ENCOUNTER — HOSPITAL ENCOUNTER (OUTPATIENT)
Dept: RADIOLOGY | Facility: HOSPITAL | Age: 58
Discharge: HOME | End: 2023-10-11
Attending: SURGERY | Admitting: SURGERY
Payer: COMMERCIAL

## 2023-10-11 DIAGNOSIS — R09.A2 GLOBUS SENSATION: ICD-10-CM

## 2023-10-11 PROBLEM — K21.9 GASTROESOPHAGEAL REFLUX DISEASE WITHOUT ESOPHAGITIS: Status: ACTIVE | Noted: 2023-10-04

## 2023-10-11 PROCEDURE — 250N000013 HC RX MED GY IP 250 OP 250 PS 637: Performed by: SURGERY

## 2023-10-11 PROCEDURE — 74221 X-RAY XM ESOPHAGUS 2CNTRST: CPT

## 2023-10-11 RX ADMIN — ANTACID/ANTIFLATULENT 4 G: 380; 550; 10; 10 GRANULE, EFFERVESCENT ORAL at 08:26

## 2023-10-11 NOTE — LETTER
Pre-op Physical: 10/17/2023 at 1:00 pm with Dr. Candelaria at the [facility]    Surgery Date: 10/19/2023     Location: [address], [place] [address]    Approximate Arrival Time: 2:00 pm  (Unless instructed differently by the pre-op call nurse)     Post op Appointment: 10/26/2023 at   9:40 am  with   . [facility]-[address], [place] [address].    Pre-Surgical Tasks:     Schedule a pre-op physical with your primary care doctor if not internal to [facility].  If internal, we have scheduled this.   The pre-op physical must be 10-30 days before surgery and since it is required by anesthesia, your surgery will be cancelled if it's not done.      Review all medications with your primary care or prescribing physician; they will advise you which meds to stop and when, and when you can resume taking.  Certain medications like blood thinners and weight loss medications need to be stopped in advance of surgery to proceed safely.      Blood thinners including but not exclusive to drugs like Xarelto, Eliquis, Warfarin and Aspirin, should be stopped five days before surgery, if your prescribing provider agrees. Follow your provider's advice on stopping blood thinners because they know you best.  If you are unsure if your medication is a blood thinner, ask your prescribing provider.    Weight loss medications: There are multiple medications being used for weight management and diabetes today, and the list is growing.  Phentermine, Ozempic, Wegovy, Trulicity, and other similar medications need to be stopped one week before surgery to avoid being cancelled.  Victoza and Saxenda can be continued longer but must be stopped one full day before surgery.  Please ask your prescribing provider for advice.    Diabetic medications: in addition to the medications talked about above that are used for either  weight loss or diabetes, some people are on insulin that may require adjustment.  Please discuss managing diabetic medications with your prescribing doctor as these medications may require modification prior to surgery.     Fasting instructions will be provided by the pre-op nurse who will call you 1-3 days before surgery.  Typically, we advise normal food up to 8 hours before you arrive for surgery. Clear liquids only from then until 2 hours before you arrive surgery, then nothing at all by mouth.  The nurse will review your specific instructions with you at the call.      Smoking impacts your body's ability to heal properly so we advise patients to quit if possible before surgery.  Plastic Surgery patients are required to be nicotine free for at least 8 weeks before surgery.      You will need an adult to drive you home and stay with you 24 hours after surgery. Public transportation or Medical Van Services are not permitted.    Visitor restrictions are subject to change, please verify with the pre-op nurse when they call how many people are permitted to accompany you.    We always encourage you to notify your insurance any time you have medical tests or procedures scheduled including surgery. The number is usually right on the back of your insurance card. To obtain pricing for surgery, please call  [facility] at [phone] or email [email or web address].        Call our office if you have any questions! Thank you!

## 2023-10-11 NOTE — TELEPHONE ENCOUNTER
Spoke with patient today regarding surgery scheduling     Went over details/instructions.    Surgery Letter sent via [employer]  (Please see LETTERS TAB in chart to retrieve a copy of this letter)

## 2023-10-17 ENCOUNTER — LAB (OUTPATIENT)
Dept: LAB | Facility: CLINIC | Age: 58
End: 2023-10-17
Payer: COMMERCIAL

## 2023-10-17 ENCOUNTER — OFFICE VISIT (OUTPATIENT)
Dept: INTERNAL MEDICINE | Facility: CLINIC | Age: 58
End: 2023-10-17
Payer: COMMERCIAL

## 2023-10-17 ENCOUNTER — TRANSFERRED RECORDS (OUTPATIENT)
Dept: HEALTH INFORMATION MANAGEMENT | Facility: CLINIC | Age: 58
End: 2023-10-17

## 2023-10-17 VITALS
TEMPERATURE: 98.1 F | BODY MASS INDEX: 30.52 KG/M2 | HEART RATE: 78 BPM | OXYGEN SATURATION: 98 % | RESPIRATION RATE: 16 BRPM | WEIGHT: 218 LBS | DIASTOLIC BLOOD PRESSURE: 89 MMHG | SYSTOLIC BLOOD PRESSURE: 124 MMHG | HEIGHT: 71 IN

## 2023-10-17 DIAGNOSIS — A69.20 LYME DISEASE: ICD-10-CM

## 2023-10-17 DIAGNOSIS — Z01.818 PREOP GENERAL PHYSICAL EXAM: ICD-10-CM

## 2023-10-17 DIAGNOSIS — I10 ESSENTIAL HYPERTENSION: ICD-10-CM

## 2023-10-17 DIAGNOSIS — K21.9 GASTROESOPHAGEAL REFLUX DISEASE WITHOUT ESOPHAGITIS: ICD-10-CM

## 2023-10-17 DIAGNOSIS — A52.8 LATE SYPHILIS, LATENT: Primary | ICD-10-CM

## 2023-10-17 DIAGNOSIS — R13.19 ESOPHAGEAL DYSPHAGIA: ICD-10-CM

## 2023-10-17 DIAGNOSIS — Z01.818 PREOP GENERAL PHYSICAL EXAM: Primary | ICD-10-CM

## 2023-10-17 LAB
B BURGDOR IGG+IGM SER QL: 0.04
ERYTHROCYTE [DISTWIDTH] IN BLOOD BY AUTOMATED COUNT: 13.3 % (ref 10–15)
HCT VFR BLD AUTO: 35.8 % (ref 40–53)
HGB BLD-MCNC: 12.5 G/DL (ref 13.3–17.7)
MCH RBC QN AUTO: 31.9 PG (ref 26.5–33)
MCHC RBC AUTO-ENTMCNC: 34.9 G/DL (ref 31.5–36.5)
MCV RBC AUTO: 91 FL (ref 78–100)
PLATELET # BLD AUTO: 286 10E3/UL (ref 150–450)
RBC # BLD AUTO: 3.92 10E6/UL (ref 4.4–5.9)
T PALLIDUM AB SER QL: NONREACTIVE
WBC # BLD AUTO: 4.8 10E3/UL (ref 4–11)

## 2023-10-17 PROCEDURE — 86618 LYME DISEASE ANTIBODY: CPT

## 2023-10-17 PROCEDURE — 36415 COLL VENOUS BLD VENIPUNCTURE: CPT

## 2023-10-17 PROCEDURE — 99214 OFFICE O/P EST MOD 30 MIN: CPT | Performed by: INTERNAL MEDICINE

## 2023-10-17 PROCEDURE — 85027 COMPLETE CBC AUTOMATED: CPT

## 2023-10-17 PROCEDURE — 86780 TREPONEMA PALLIDUM: CPT

## 2023-10-17 NOTE — H&P (VIEW-ONLY)
[facility]  [address] [place] [id number]  Phone: [phone]  Fax: [phone]  Primary Provider: Jai Garvin  Pre-op Performing Provider: NADYA ROBERTS      PREOPERATIVE EVALUATION:  Today's date: 10/17/2023    Arthur is a 58 year old male who presents for a preoperative evaluation.      10/17/2023    12:46 PM   Additional Questions   Roomed by        Surgical Information:  Surgery/Procedure: ESOPHAGOGASTRODUODENOSCOPY, WITH BIOPSY   Surgery Location: [facility] [place]  Surgeon: Dr. Blair  Surgery Date: 10/19/23  Time of Surgery:   Where patient plans to recover: At home with family  Fax number for surgical facility: Note does not need to be faxed, will be available electronically in Epic.    Assessment & Plan     The proposed surgical procedure is considered LOW risk.    Preop general physical exam  58-year-old male here for preop clearance prior to upcoming endoscopy under general anesthesia.  He has tolerated previous surgery and anesthesia without any complications.  Overall risk is low and he is cleared to proceed with procedure and anesthesia as planned.  He will avoid aspirin and NSAIDs this week.      Esophageal dysphagia with GERD and possible eosinophilic esophagitis  Plans for EGD under general anesthesia  - CBC with platelets; Future        Essential hypertension  Blood pressure fairly well controlled.  He is instructed to take his usual doses of amlodipine and lisinopril on the morning of surgery with a small sip of water  - CBC with platelets; Future               Antiplatelet or Anticoagulation Medication Instructions:   - Patient is on no antiplatelet or anticoagulation medications.    Additional Medication Instructions:  Avoid aspirin and over-the-counter NSAIDs including ibuprofen, Motrin, Advil and Aleve between now and your procedure on Thursday.    On the morning of your endoscopy, I would recommend taking your usual doses of omeprazole,  amlodipine and lisinopril with a small sip of water    RECOMMENDATION:  APPROVAL GIVEN to proceed with proposed procedure, without further diagnostic evaluation.            Subjective       HPI related to upcoming procedure: 58-year-old male here for preop clearance prior to upcoming EGD under general anesthesia.  See assessment and plan for details.        10/16/2023     4:11 PM   Preop Questions   1. Have you ever had a heart attack or stroke? No   2. Have you ever had surgery on your heart or blood vessels, such as a stent placement, a coronary artery bypass, or surgery on an artery in your head, neck, heart, or legs? No   3. Do you have chest pain with activity? No   4. Do you have a history of  heart failure? No   5. Do you currently have a cold, bronchitis or symptoms of other infection? No   6. Do you have a cough, shortness of breath, or wheezing? No   7. Do you or anyone in your family have previous history of blood clots? No   8. Do you or does anyone in your family have a serious bleeding problem such as prolonged bleeding following surgeries or cuts? No   9. Have you ever had problems with anemia or been told to take iron pills? No   10. Have you had any abnormal blood loss such as black, tarry or bloody stools? No   11. Have you ever had a blood transfusion? No   12. Are you willing to have a blood transfusion if it is medically needed before, during, or after your surgery? Yes   13. Have you or any of your relatives ever had problems with anesthesia? No   14. Do you have sleep apnea, excessive snoring or daytime drowsiness? No   15. Do you have any artifical heart valves or other implanted medical devices like a pacemaker, defibrillator, or continuous glucose monitor? No   16. Do you have artificial joints? No   17. Are you allergic to latex? No     Preoperative Review of :   reviewed - controlled substances reflected in medication list.          Review of Systems  12 point review of systems is  negative other than what is discussed in the assessment and plan    No exertional chest pain or shortness of breath.  No abdominal pain.  No melena    Patient Active Problem List    Diagnosis Date Noted    Gastroesophageal reflux disease without esophagitis 10/04/2023     Priority: Medium    Aneurysm of internal carotid artery 07/11/2023     Priority: Medium    Mixed dyslipidemia 04/18/2023     Priority: Medium    Serum calcium elevated 04/18/2023     Priority: Medium    Hip pain 04/01/2023     Priority: Medium    Overweight and obesity 06/14/2022     Priority: Medium    Benign neoplasm of transverse colon 05/16/2022     Priority: Medium    Epigastric pain 05/12/2022     Priority: Medium    Nausea 05/12/2022     Priority: Medium    Polyp of colon 05/12/2022     Priority: Medium    Gout 03/17/2022     Priority: Medium     Formatting of this note might be different from the original.  Created by Conversion    Replacement Utility updated for latest IMO load      Delayed gastric emptying 05/01/2015     Priority: Medium    Indigestion 05/01/2015     Priority: Medium    Eosinophilic esophagitis 04/17/2015     Priority: Medium    Vitamin D deficiency 03/13/2015     Priority: Medium    Dizziness of unknown cause 01/01/2014     Priority: Medium    Essential hypertension 07/10/2003     Priority: Medium     Problem list name updated by automated process. Provider to review        Past Medical History:   Diagnosis Date    Achilles bursitis or tendinitis     Created by Conversion     Anemia, unspecified     Created by Conversion     Gouty arthropathy, unspecified     Created by Conversion     Unspecified essential hypertension     Unspecified essential hypertension     Created by Conversion      Past Surgical History:   Procedure Laterality Date    FINGER SURGERY      INGUINAL HERNIA REPAIR      OTHER SURGICAL HISTORY      left knee arthroscopy    REFRACTIVE SURGERY      ZZC NONSPECIFIC PROCEDURE  1990    R inguinal  "herniorrhaphy     Current Outpatient Medications   Medication Sig Dispense Refill    amLODIPine (NORVASC) 5 MG tablet TAKE 1 TABLET BY MOUTH EVERY DAY 90 tablet 0    lisinopril (ZESTRIL) 40 MG tablet Take 1 tablet (40 mg) by mouth daily 90 tablet 3    LORazepam (ATIVAN) 1 MG tablet Take 1 tablet (1 mg) by mouth every 6 hours as needed for vomiting or nausea (vertigo) 6 tablet 0    meclizine (ANTIVERT) 25 MG tablet Take 1 tablet (25 mg) by mouth 3 times daily as needed for dizziness 20 tablet 0    omeprazole (PRILOSEC) 20 MG DR capsule Take 20 mg by mouth daily      ondansetron (ZOFRAN) 4 MG tablet Take 1 tablet (4 mg) by mouth every 8 hours as needed for nausea 90 tablet 0       Allergies   Allergen Reactions    Penicillins      policillin        Social History     Tobacco Use    Smoking status: Never     Passive exposure: Past    Smokeless tobacco: Former     Types: Chew    Tobacco comments:     Chews.   Substance Use Topics    Alcohol use: Yes     Comment: Alcoholic Drinks/day: Socially       History   Drug Use No         Objective     /89   Pulse 78   Temp 98.1  F (36.7  C) (Oral)   Resp 16   Ht 1.803 m (5' 11\")   Wt 98.9 kg (218 lb)   SpO2 98%   BMI 30.40 kg/m      Physical Exam  GENERAL APPEARANCE: healthy, alert and no distress  HENT: Normal  RESP: lungs clear to auscultation - no rales, rhonchi or wheezes  CV: regular rate and rhythm, normal S1 S2, no S3 or S4 and no murmur, click or rub.  No carotid bruits.  No peripheral edema  ABDOMEN: soft, nontender, no HSM or masses and bowel sounds normal  NEURO: Normal strength and tone, sensory exam grossly normal, mentation intact and speech normal      Diagnostics:  Labs-  Hemoglobin 12.5 platelet 286     No EKG this visit, completed in the last 90 days.    Revised Cardiac Risk Index (RCRI):  The patient has the following serious cardiovascular risks for perioperative complications:   - No serious cardiac risks = 0 points     RCRI Interpretation: 0 " points: Class I (very low risk - 0.4% complication rate)         Signed Electronically by: Kalyan Candelaria MD  Copy of this evaluation report is provided to requesting physician.

## 2023-10-17 NOTE — PATIENT INSTRUCTIONS
Preparing for Your Surgery  Getting started  A nurse will call you to review your health history and instructions. They will give you an arrival time based on your scheduled surgery time. Please be ready to share:  Your doctor's clinic name and phone number  Your medical, surgical, and anesthesia history  A list of allergies and sensitivities  A list of medicines, including herbal treatments and over-the-counter drugs  Whether the patient has a legal guardian (ask how to send us the papers in advance)  Please tell us if you're pregnant--or if there's any chance you might be pregnant. Some surgeries may injure a fetus (unborn baby), so they require a pregnancy test. Surgeries that are safe for a fetus don't always need a test, and you can choose whether to have one.   If you have a child who's having surgery, please ask for a copy of Preparing for Your Child's Surgery.    Preparing for surgery  Within 10 to 30 days of surgery: Have a pre-op exam (sometimes called an H&P, or History and Physical). This can be done at a clinic or pre-operative center.  If you're having a , you may not need this exam. Talk to your care team.  At your pre-op exam, talk to your care team about all medicines you take. If you need to stop any medicines before surgery, ask when to start taking them again.  We do this for your safety. Many medicines can make you bleed too much during surgery. Some change how well surgery (anesthesia) drugs work.  Call your insurance company to let them know you're having surgery. (If you don't have insurance, call [phone].)  Call your clinic if there's any change in your health. This includes signs of a cold or flu (sore throat, runny nose, cough, rash, fever). It also includes a scrape or scratch near the surgery site.  If you have questions on the day of surgery, call your hospital or surgery center.  Eating and drinking guidelines  For your safety: Unless your surgeon tells you otherwise,  follow the guidelines below.  Eat and drink as usual until 8 hours before you arrive for surgery. After that, no food or milk.  Drink clear liquids until 2 hours before you arrive. These are liquids you can see through, like water, Gatorade, and Propel Water. They also include plain black coffee and tea (no cream or milk), candy, and breath mints. You can spit out gum when you arrive.  If you drink alcohol: Stop drinking it the night before surgery.  If your care team tells you to take medicine on the morning of surgery, it's okay to take it with a sip of water.  Preventing infection  Shower or bathe the night before and morning of your surgery. Follow the instructions your clinic gave you. (If no instructions, use regular soap.)  Don't shave or clip hair near your surgery site. We'll remove the hair if needed.  Don't smoke or vape the morning of surgery. You may chew nicotine gum up to 2 hours before surgery. A nicotine patch is okay.  Note: Some surgeries require you to completely quit smoking and nicotine. Check with your surgeon.  Your care team will make every effort to keep you safe from infection. We will:  Clean our hands often with soap and water (or an alcohol-based hand rub).  Clean the skin at your surgery site with a special soap that kills germs.  Give you a special gown to keep you warm. (Cold raises the risk of infection.)  Wear special hair covers, masks, gowns and gloves during surgery.  Give antibiotic medicine, if prescribed. Not all surgeries need antibiotics.  What to bring on the day of surgery  Photo ID and insurance card  Copy of your health care directive, if you have one  Glasses and hearing aids (bring cases)  You can't wear contacts during surgery  Inhaler and eye drops, if you use them (tell us about these when you arrive)  CPAP machine or breathing device, if you use them  A few personal items, if spending the night  If you have . . .  A pacemaker, ICD (cardiac defibrillator) or other  implant: Bring the ID card.  An implanted stimulator: Bring the remote control.  A legal guardian: Bring a copy of the certified (court-stamped) guardianship papers.  Please remove any jewelry, including body piercings. Leave jewelry and other valuables at home.  If you're going home the day of surgery  You must have a responsible adult drive you home. They should stay with you overnight as well.  If you don't have someone to stay with you, and you aren't safe to go home alone, we may keep you overnight. Insurance often won't pay for this.  After surgery  If it's hard to control your pain or you need more pain medicine, please call your surgeon's office.  Questions?   If you have any questions for your care team, list them here: _________________________________________________________________________________________________________________________________________________________________________ ____________________________________ ____________________________________ ____________________________________  For informational purposes only. Not to replace the advice of your health care provider. Copyright   2003, 2019 [employer]. All rights reserved. Clinically reviewed by Elaina Flanagan MD. [employer] 071466 - REV 12/22.    How to Take Your Medication Before Surgery    Avoid aspirin and over-the-counter NSAIDs including ibuprofen, Motrin, Advil and Aleve between now and your procedure on Thursday.    On the morning of your endoscopy, I would recommend taking your usual doses of omeprazole, amlodipine and lisinopril with a small sip of water

## 2023-10-17 NOTE — PROGRESS NOTES
[facility] [place]  [address] [place] [id number]  Phone: [phone]  Fax: [phone]  Primary Provider: Jai Garvin  Pre-op Performing Provider: NADYA ROBERTS      PREOPERATIVE EVALUATION:  Today's date: 10/17/2023    Arthur is a 58 year old male who presents for a preoperative evaluation.      10/17/2023    12:46 PM   Additional Questions   Roomed by        Surgical Information:  Surgery/Procedure: ESOPHAGOGASTRODUODENOSCOPY, WITH BIOPSY   Sur[place] [facility] [place]  Surgeon: Dr. Blair  Surgery Date: 10/19/23  Time of Surgery:   Where patient plans to recover: At home with family  Fax number for surgical facility: Note does not need to be faxed, will be available electronically in Epic.    Assessment & Plan     The proposed surgical procedure is considered LOW risk.    Preop general physical exam  58-year-old male here for preop clearance prior to upcoming endoscopy under general anesthesia.  He has tolerated previous surgery and anesthesia without any complications.  Overall risk is low and he is cleared to proceed with procedure and anesthesia as planned.  He will avoid aspirin and NSAIDs this week.      Esophageal dysphagia with GERD and possible eosinophilic esophagitis  Plans for EGD under general anesthesia  - CBC with platelets; Future        Essential hypertension  Blood pressure fairly well controlled.  He is instructed to take his usual doses of amlodipine and lisinopril on the morning of surgery with a small sip of water  - CBC with platelets; Future               Antiplatelet or Anticoagulation Medication Instructions:   - Patient is on no antiplatelet or anticoagulation medications.    Additional Medication Instructions:  Avoid aspirin and over-the-counter NSAIDs including ibuprofen, Motrin, Advil and Aleve between now and your procedure on Thursday.    On the morning of your endoscopy, I would recommend taking your usual doses of omeprazole,  amlodipine and lisinopril with a small sip of water    RECOMMENDATION:  APPROVAL GIVEN to proceed with proposed procedure, without further diagnostic evaluation.            Subjective       HPI related to upcoming procedure: 58-year-old male here for preop clearance prior to upcoming EGD under general anesthesia.  See assessment and plan for details.        10/16/2023     4:11 PM   Preop Questions   1. Have you ever had a heart attack or stroke? No   2. Have you ever had surgery on your heart or blood vessels, such as a stent placement, a coronary artery bypass, or surgery on an artery in your head, neck, heart, or legs? No   3. Do you have chest pain with activity? No   4. Do you have a history of  heart failure? No   5. Do you currently have a cold, bronchitis or symptoms of other infection? No   6. Do you have a cough, shortness of breath, or wheezing? No   7. Do you or anyone in your family have previous history of blood clots? No   8. Do you or does anyone in your family have a serious bleeding problem such as prolonged bleeding following surgeries or cuts? No   9. Have you ever had problems with anemia or been told to take iron pills? No   10. Have you had any abnormal blood loss such as black, tarry or bloody stools? No   11. Have you ever had a blood transfusion? No   12. Are you willing to have a blood transfusion if it is medically needed before, during, or after your surgery? Yes   13. Have you or any of your relatives ever had problems with anesthesia? No   14. Do you have sleep apnea, excessive snoring or daytime drowsiness? No   15. Do you have any artifical heart valves or other implanted medical devices like a pacemaker, defibrillator, or continuous glucose monitor? No   16. Do you have artificial joints? No   17. Are you allergic to latex? No     Preoperative Review of :   reviewed - controlled substances reflected in medication list.          Review of Systems  12 point review of systems is  negative other than what is discussed in the assessment and plan    No exertional chest pain or shortness of breath.  No abdominal pain.  No melena    Patient Active Problem List    Diagnosis Date Noted    Gastroesophageal reflux disease without esophagitis 10/04/2023     Priority: Medium    Aneurysm of internal carotid artery 07/11/2023     Priority: Medium    Mixed dyslipidemia 04/18/2023     Priority: Medium    Serum calcium elevated 04/18/2023     Priority: Medium    Hip pain 04/01/2023     Priority: Medium    Overweight and obesity 06/14/2022     Priority: Medium    Benign neoplasm of transverse colon 05/16/2022     Priority: Medium    Epigastric pain 05/12/2022     Priority: Medium    Nausea 05/12/2022     Priority: Medium    Polyp of colon 05/12/2022     Priority: Medium    Gout 03/17/2022     Priority: Medium     Formatting of this note might be different from the original.  Created by Conversion    Replacement Utility updated for latest IMO load      Delayed gastric emptying 05/01/2015     Priority: Medium    Indigestion 05/01/2015     Priority: Medium    Eosinophilic esophagitis 04/17/2015     Priority: Medium    Vitamin D deficiency 03/13/2015     Priority: Medium    Dizziness of unknown cause 01/01/2014     Priority: Medium    Essential hypertension 07/10/2003     Priority: Medium     Problem list name updated by automated process. Provider to review        Past Medical History:   Diagnosis Date    Achilles bursitis or tendinitis     Created by Conversion     Anemia, unspecified     Created by Conversion     Gouty arthropathy, unspecified     Created by Conversion     Unspecified essential hypertension     Unspecified essential hypertension     Created by Conversion      Past Surgical History:   Procedure Laterality Date    FINGER SURGERY      INGUINAL HERNIA REPAIR      OTHER SURGICAL HISTORY      left knee arthroscopy    REFRACTIVE SURGERY      ZZC NONSPECIFIC PROCEDURE  1990    R inguinal  "herniorrhaphy     Current Outpatient Medications   Medication Sig Dispense Refill    amLODIPine (NORVASC) 5 MG tablet TAKE 1 TABLET BY MOUTH EVERY DAY 90 tablet 0    lisinopril (ZESTRIL) 40 MG tablet Take 1 tablet (40 mg) by mouth daily 90 tablet 3    LORazepam (ATIVAN) 1 MG tablet Take 1 tablet (1 mg) by mouth every 6 hours as needed for vomiting or nausea (vertigo) 6 tablet 0    meclizine (ANTIVERT) 25 MG tablet Take 1 tablet (25 mg) by mouth 3 times daily as needed for dizziness 20 tablet 0    omeprazole (PRILOSEC) 20 MG DR capsule Take 20 mg by mouth daily      ondansetron (ZOFRAN) 4 MG tablet Take 1 tablet (4 mg) by mouth every 8 hours as needed for nausea 90 tablet 0       Allergies   Allergen Reactions    Penicillins      policillin        Social History     Tobacco Use    Smoking status: Never     Passive exposure: Past    Smokeless tobacco: Former     Types: Chew    Tobacco comments:     Chews.   Substance Use Topics    Alcohol use: Yes     Comment: Alcoholic Drinks/day: Socially       History   Drug Use No         Objective     /89   Pulse 78   Temp 98.1  F (36.7  C) (Oral)   Resp 16   Ht 1.803 m (5' 11\")   Wt 98.9 kg (218 lb)   SpO2 98%   BMI 30.40 kg/m      Physical Exam  GENERAL APPEARANCE: healthy, alert and no distress  HENT: Normal  RESP: lungs clear to auscultation - no rales, rhonchi or wheezes  CV: regular rate and rhythm, normal S1 S2, no S3 or S4 and no murmur, click or rub.  No carotid bruits.  No peripheral edema  ABDOMEN: soft, nontender, no HSM or masses and bowel sounds normal  NEURO: Normal strength and tone, sensory exam grossly normal, mentation intact and speech normal      Diagnostics:  Labs-  Hemoglobin 12.5 platelet 286     No EKG this visit, completed in the last 90 days.    Revised Cardiac Risk Index (RCRI):  The patient has the following serious cardiovascular risks for perioperative complications:   - No serious cardiac risks = 0 points     RCRI Interpretation: 0 " points: Class I (very low risk - 0.4% complication rate)         Signed Electronically by: Kalyan Candelaria MD  Copy of this evaluation report is provided to requesting physician.

## 2023-10-18 ENCOUNTER — ANESTHESIA EVENT (OUTPATIENT)
Dept: SURGERY | Facility: AMBULATORY SURGERY CENTER | Age: 58
End: 2023-10-18
Payer: COMMERCIAL

## 2023-10-19 ENCOUNTER — ANESTHESIA (OUTPATIENT)
Dept: SURGERY | Facility: AMBULATORY SURGERY CENTER | Age: 58
End: 2023-10-19
Payer: COMMERCIAL

## 2023-10-19 ENCOUNTER — TELEPHONE (OUTPATIENT)
Dept: SURGERY | Facility: CLINIC | Age: 58
End: 2023-10-19
Payer: COMMERCIAL

## 2023-10-19 NOTE — LETTER
Pre-op Physical: 10/17/2023 at 1:00 pm with Dr. Candelaria at the [facility]    Surgery Date: 10/31/2023     Location: [address] [address]    Approximate Arrival Time: 11:00 am  (Unless instructed differently by the pre-op call nurse)     Post op Appointment: 11/9/2023 at   10:10 am  with   . [facility] [address].    Pre-Surgical Tasks:     Schedule a pre-op physical with your primary care doctor if not internal to [facility].  If internal, we have scheduled this.   The pre-op physical must be 10-30 days before surgery and since it is required by anesthesia, your surgery will be cancelled if it's not done.      Review all medications with your primary care or prescribing physician; they will advise you which meds to stop and when, and when you can resume taking.  Certain medications like blood thinners and weight loss medications need to be stopped in advance of surgery to proceed safely.      Blood thinners including but not exclusive to drugs like Xarelto, Eliquis, Warfarin and Aspirin, should be stopped five days before surgery, if your prescribing provider agrees. Follow your provider's advice on stopping blood thinners because they know you best.  If you are unsure if your medication is a blood thinner, ask your prescribing provider.    Weight loss medications: There are multiple medications being used for weight management and diabetes today, and the list is growing.  Phentermine, Ozempic, Wegovy, Trulicity, and other similar medications need to be stopped one week before surgery to avoid being cancelled.  Victoza and Saxenda can be continued longer but must be stopped one full day before surgery.  Please ask your prescribing provider for advice.    Diabetic medications: in addition to the medications talked about above that are used for either  weight loss or diabetes, some people are on insulin that may require adjustment.  Please discuss managing diabetic medications with your prescribing doctor as these medications may require modification prior to surgery.     Fasting instructions will be provided by the pre-op nurse who will call you 1-3 days before surgery.  Typically, we advise normal food up to 8 hours before you arrive for surgery. Clear liquids only from then until 2 hours before you arrive surgery, then nothing at all by mouth.  The nurse will review your specific instructions with you at the call.      Smoking impacts your body's ability to heal properly so we advise patients to quit if possible before surgery.  Plastic Surgery patients are required to be nicotine free for at least 8 weeks before surgery.      You will need an adult to drive you home and stay with you 24 hours after surgery. Public transportation or Medical Van Services are not permitted.    Visitor restrictions are subject to change, please verify with the pre-op nurse when they call how many people are permitted to accompany you.    We always encourage you to notify your insurance any time you have medical tests or procedures scheduled including surgery. The number is usually right on the back of your insurance card. To obtain pricing for surgery, please call  [facility] at [phone] or email [email or web address].        Call our office if you have any questions! Thank you!

## 2023-10-19 NOTE — TELEPHONE ENCOUNTER
Surgeon running late in the OR has to r/s case. Pt rescheduled to 10/31. Spoke with pt who agreed to the date change.     New Letter sent via [employer]  [facility] notified

## 2023-10-23 ENCOUNTER — OFFICE VISIT (OUTPATIENT)
Dept: INTERNAL MEDICINE | Facility: CLINIC | Age: 58
End: 2023-10-23
Payer: COMMERCIAL

## 2023-10-23 VITALS
HEART RATE: 90 BPM | WEIGHT: 217 LBS | RESPIRATION RATE: 16 BRPM | HEIGHT: 71 IN | OXYGEN SATURATION: 96 % | DIASTOLIC BLOOD PRESSURE: 88 MMHG | BODY MASS INDEX: 30.38 KG/M2 | TEMPERATURE: 98.7 F | SYSTOLIC BLOOD PRESSURE: 138 MMHG

## 2023-10-23 DIAGNOSIS — R42 DIZZINESS: ICD-10-CM

## 2023-10-23 DIAGNOSIS — D64.9 ANEMIA, UNSPECIFIED TYPE: Primary | ICD-10-CM

## 2023-10-23 DIAGNOSIS — E55.9 VITAMIN D DEFICIENCY: ICD-10-CM

## 2023-10-23 LAB
BASOPHILS # BLD AUTO: 0.1 10E3/UL (ref 0–0.2)
BASOPHILS NFR BLD AUTO: 2 %
EOSINOPHIL # BLD AUTO: 0.2 10E3/UL (ref 0–0.7)
EOSINOPHIL NFR BLD AUTO: 4 %
ERYTHROCYTE [DISTWIDTH] IN BLOOD BY AUTOMATED COUNT: 13.1 % (ref 10–15)
FERRITIN SERPL-MCNC: 270 NG/ML (ref 31–409)
FOLATE SERPL-MCNC: 11.9 NG/ML (ref 4.6–34.8)
HCT VFR BLD AUTO: 39 % (ref 40–53)
HGB BLD-MCNC: 13.3 G/DL (ref 13.3–17.7)
IMM GRANULOCYTES # BLD: 0 10E3/UL
IMM GRANULOCYTES NFR BLD: 0 %
IRON BINDING CAPACITY (ROCHE): 252 UG/DL (ref 240–430)
IRON SATN MFR SERPL: 33 % (ref 15–46)
IRON SERPL-MCNC: 83 UG/DL (ref 61–157)
LYMPHOCYTES # BLD AUTO: 1.8 10E3/UL (ref 0.8–5.3)
LYMPHOCYTES NFR BLD AUTO: 35 %
MCH RBC QN AUTO: 31 PG (ref 26.5–33)
MCHC RBC AUTO-ENTMCNC: 34.1 G/DL (ref 31.5–36.5)
MCV RBC AUTO: 91 FL (ref 78–100)
MONOCYTES # BLD AUTO: 0.6 10E3/UL (ref 0–1.3)
MONOCYTES NFR BLD AUTO: 11 %
NEUTROPHILS # BLD AUTO: 2.4 10E3/UL (ref 1.6–8.3)
NEUTROPHILS NFR BLD AUTO: 48 %
PLATELET # BLD AUTO: 255 10E3/UL (ref 150–450)
RBC # BLD AUTO: 4.29 10E6/UL (ref 4.4–5.9)
RETICS # AUTO: 0.07 10E6/UL (ref 0.01–0.11)
RETICS/RBC NFR AUTO: 1.8 % (ref 0.8–2.7)
TRANSFERRIN SERPL-MCNC: 219 MG/DL (ref 200–360)
VIT B12 SERPL-MCNC: 355 PG/ML (ref 232–1245)
VIT D+METAB SERPL-MCNC: 15 NG/ML (ref 20–50)
WBC # BLD AUTO: 5 10E3/UL (ref 4–11)

## 2023-10-23 PROCEDURE — 85025 COMPLETE CBC W/AUTO DIFF WBC: CPT | Performed by: NURSE PRACTITIONER

## 2023-10-23 PROCEDURE — 99214 OFFICE O/P EST MOD 30 MIN: CPT | Performed by: NURSE PRACTITIONER

## 2023-10-23 PROCEDURE — 82728 ASSAY OF FERRITIN: CPT | Performed by: NURSE PRACTITIONER

## 2023-10-23 PROCEDURE — 36415 COLL VENOUS BLD VENIPUNCTURE: CPT | Performed by: NURSE PRACTITIONER

## 2023-10-23 PROCEDURE — 82607 VITAMIN B-12: CPT | Performed by: NURSE PRACTITIONER

## 2023-10-23 PROCEDURE — 82746 ASSAY OF FOLIC ACID SERUM: CPT | Performed by: NURSE PRACTITIONER

## 2023-10-23 PROCEDURE — 83540 ASSAY OF IRON: CPT | Performed by: NURSE PRACTITIONER

## 2023-10-23 PROCEDURE — 82306 VITAMIN D 25 HYDROXY: CPT | Performed by: NURSE PRACTITIONER

## 2023-10-23 PROCEDURE — 84466 ASSAY OF TRANSFERRIN: CPT | Performed by: NURSE PRACTITIONER

## 2023-10-23 PROCEDURE — 85045 AUTOMATED RETICULOCYTE COUNT: CPT | Performed by: NURSE PRACTITIONER

## 2023-10-23 NOTE — PATIENT INSTRUCTIONS
Call [phone] to set up the 7-day cardiac monitor and the echocardiogram.    Lab work today.    Follow-up with neurology.    Let me know if there are short-term disability paperwork that I need to complete.    Consider referral to [facility], call insurance first.

## 2023-10-23 NOTE — PROGRESS NOTES
Assessment & Plan     Dizziness  Unclear etiology.  He has been worked up by ENT and then subsequently referred to neurology.  He has an appointment with Cor[facility] in about 1 month.    He wonders if a referral to [place] would be of benefit.  I told him he should check with insurance and that I would sign off on a referral if he would like.    In the meantime, we will pursue a cardiac workup for his dizziness.     He has developed a low-grade tremor recently.  That is new.  Has been present for the last couple of months.    - Echocardiogram Complete; Future  - Adult Cardiac Event Monitor; Future    Anemia, unspecified type  Persistently mildly low hemoglobin going back at least 5 years.  He has had negative colonoscopy and endoscopy findings    Consider ref to Hematology if labs unremarkable. He does have another endoscopy coming up within a week or two     - CBC with Platelets & Differential; Future  - Iron & Iron Binding Capacity; Future  - Ferritin; Future  - Folate; Future  - Reticulocyte count; Future  - Transferrin; Future  - Vitamin B12; Future  - CBC with Platelets & Differential  - Iron & Iron Binding Capacity  - Ferritin  - Folate  - Reticulocyte count  - Transferrin  - Vitamin B12    Vitamin D deficiency  He would like labs rechecked. Admits that his diet is not especially healthy.   - Vitamin D Deficiency; Future    Patient Instructions   Call [phone] to set up the 7-day cardiac monitor and the echocardiogram.    Lab work today.    Follow-up with neurology.    Let me know if there are short-term disability paperwork that I need to complete.    Consider referral to [place], call insurance first.    Jai Garvin, [facility]    Mariza Gibson is a 58 year old, presenting for the following health issues:  Follow Up (Discuss upcoming appts - Scan showed aneurysm in neck - BP check  )      10/23/2023     1:17 PM   Additional Questions   Accompanied by Nikkie - Wife  "      History of Present Illness       Hypertension: He presents for follow up of hypertension.  He does not check blood pressure  regularly outside of the clinic. Outside blood pressures have been over 140/90. He does not follow a low salt diet.     Reason for visit:  Hbp    He eats 0-1 servings of fruits and vegetables daily.He consumes 4 sweetened beverage(s) daily.He exercises with enough effort to increase his heart rate 9 or less minutes per day.  He exercises with enough effort to increase his heart rate 3 or less days per week.   He is taking medications regularly.     Here for discussion on dizziness.  He has had issues with chronic intermittent dizziness for the last 9 years or so but it has been especially bad over the last few months.  We referred him to ENT and vestibular rehab.  ENT did a work-up that determined it was not likely an inner ear problem.  He was referred to neurology and has an appointment with [facility] coming up in about 1 month.    Wants to discuss his hemoglobin levels.  Review of his chart shows that he has had mildly low hemoglobin going back about 5 years.  Negative colonoscopy and endoscopy.      Review of Systems   Constitutional, HEENT, cardiovascular, pulmonary, gi and gu systems are negative, except as otherwise noted.      Objective    /88 (BP Location: Right arm, Patient Position: Sitting, Cuff Size: Adult Regular)   Pulse 90   Temp 98.7  F (37.1  C)   Resp 16   Ht 1.803 m (5' 11\")   Wt 98.4 kg (217 lb)   SpO2 96%   BMI 30.27 kg/m    Body mass index is 30.27 kg/m .  Physical Exam     Heart rate is regular without murmur rub or gallop.  No abdominal tenderness.  Trace edema bilateral lower extremities        "

## 2023-10-25 ENCOUNTER — TELEPHONE (OUTPATIENT)
Dept: INTERNAL MEDICINE | Facility: CLINIC | Age: 58
End: 2023-10-25
Payer: COMMERCIAL

## 2023-10-25 NOTE — TELEPHONE ENCOUNTER
I received some short-term disability paperwork.  We will need to schedule a telephone visit to go over his disability paperwork together in detail and make sure that it is completed thoroughly and appropriately.    My advice would be that he would get a copy of this from [facility], and review it with me during the telephone encounter.    Please call patient and help him schedule telephone visit.

## 2023-10-31 ENCOUNTER — HOSPITAL ENCOUNTER (OUTPATIENT)
Facility: AMBULATORY SURGERY CENTER | Age: 58
Discharge: HOME | End: 2023-10-31
Attending: SURGERY
Payer: COMMERCIAL

## 2023-10-31 ENCOUNTER — TRANSFERRED RECORDS (OUTPATIENT)
Dept: HEALTH INFORMATION MANAGEMENT | Facility: CLINIC | Age: 58
End: 2023-10-31

## 2023-10-31 VITALS
OXYGEN SATURATION: 94 % | HEART RATE: 57 BPM | TEMPERATURE: 96.9 F | RESPIRATION RATE: 16 BRPM | DIASTOLIC BLOOD PRESSURE: 82 MMHG | SYSTOLIC BLOOD PRESSURE: 131 MMHG

## 2023-10-31 DIAGNOSIS — K21.9 GASTROESOPHAGEAL REFLUX DISEASE WITHOUT ESOPHAGITIS: ICD-10-CM

## 2023-10-31 LAB — UPPER GI ENDOSCOPY: NORMAL

## 2023-10-31 RX ORDER — FENTANYL CITRATE 0.05 MG/ML
25 INJECTION, SOLUTION INTRAMUSCULAR; INTRAVENOUS
Status: DISCONTINUED | OUTPATIENT
Start: 2023-10-31 | End: 2023-11-01 | Stop reason: HOSPADM

## 2023-10-31 RX ORDER — LIDOCAINE 40 MG/G
CREAM TOPICAL
Status: DISCONTINUED | OUTPATIENT
Start: 2023-10-31 | End: 2023-11-01 | Stop reason: HOSPADM

## 2023-10-31 RX ORDER — ONDANSETRON 2 MG/ML
4 INJECTION INTRAMUSCULAR; INTRAVENOUS EVERY 30 MIN PRN
Status: DISCONTINUED | OUTPATIENT
Start: 2023-10-31 | End: 2023-11-01 | Stop reason: HOSPADM

## 2023-10-31 RX ORDER — ACETAMINOPHEN 325 MG/1
975 TABLET ORAL ONCE
Status: DISCONTINUED | OUTPATIENT
Start: 2023-10-31 | End: 2023-11-01 | Stop reason: HOSPADM

## 2023-10-31 RX ORDER — ONDANSETRON 4 MG/1
4 TABLET, ORALLY DISINTEGRATING ORAL EVERY 30 MIN PRN
Status: DISCONTINUED | OUTPATIENT
Start: 2023-10-31 | End: 2023-11-01 | Stop reason: HOSPADM

## 2023-10-31 RX ORDER — LIDOCAINE HYDROCHLORIDE 20 MG/ML
INJECTION, SOLUTION INFILTRATION; PERINEURAL PRN
Status: DISCONTINUED | OUTPATIENT
Start: 2023-10-31 | End: 2023-10-31

## 2023-10-31 RX ORDER — SODIUM CHLORIDE, SODIUM LACTATE, POTASSIUM CHLORIDE, CALCIUM CHLORIDE 600; 310; 30; 20 MG/100ML; MG/100ML; MG/100ML; MG/100ML
INJECTION, SOLUTION INTRAVENOUS CONTINUOUS
Status: DISCONTINUED | OUTPATIENT
Start: 2023-10-31 | End: 2023-11-01 | Stop reason: HOSPADM

## 2023-10-31 RX ORDER — PROPOFOL 10 MG/ML
INJECTION, EMULSION INTRAVENOUS PRN
Status: DISCONTINUED | OUTPATIENT
Start: 2023-10-31 | End: 2023-10-31

## 2023-10-31 RX ORDER — PROPOFOL 10 MG/ML
INJECTION, EMULSION INTRAVENOUS CONTINUOUS PRN
Status: DISCONTINUED | OUTPATIENT
Start: 2023-10-31 | End: 2023-10-31

## 2023-10-31 RX ADMIN — PROPOFOL 160 MCG/KG/MIN: 10 INJECTION, EMULSION INTRAVENOUS at 11:06

## 2023-10-31 RX ADMIN — SODIUM CHLORIDE, SODIUM LACTATE, POTASSIUM CHLORIDE, CALCIUM CHLORIDE: 600; 310; 30; 20 INJECTION, SOLUTION INTRAVENOUS at 10:39

## 2023-10-31 RX ADMIN — PROPOFOL 140 MG: 10 INJECTION, EMULSION INTRAVENOUS at 11:02

## 2023-10-31 RX ADMIN — LIDOCAINE HYDROCHLORIDE 160 MG: 20 INJECTION, SOLUTION INFILTRATION; PERINEURAL at 11:02

## 2023-10-31 NOTE — DISCHARGE INSTRUCTIONS
If you have any questions or concerns regarding your procedure, please contact Dr. Blair, his office number is [phone].     Discharge Instructions:    Take you medications as directed and follow up with you primary provider as scheduled.   Follow these care guidelines during your recovery for the next 24 hours.   If you have any questions or concerns please contact the provider that performed your procedure.     You were given medicine for sedation:  You have been given medicines during your procedure that might make you sleepy and weak. To prevent problems:    *Rest for the rest of the day after you are home. You should be back to your normal activity tomorrow.  *For the next 24 hours:   -Do not drink alcoholic beverages.   -Do not make any important decisions or sign any legal forms.   -Do not work around machinery or power equipment.     The medicines used for sedation may make you feel nauseated.   *Start with clear liquids, such as tea, jell-o, broth and ginger ale. As you feel better you may add soft foods such as pudding and ice cream.   *When you no longer feel nauseated, you may try your normal diet.     You should be back to eating your normal after 24 hours.     Call if you have any of these problems:  *Fever of 101 degree F or 38 degrees C  *Black stool or blood in your bowel movements  *Nausea with vomiting that does not ease after a few hours or has blood in it.  *Abdominal pain or bloating  *Fainting

## 2023-10-31 NOTE — ANESTHESIA CARE TRANSFER NOTE
Patient: Arthur Lawson    Procedure: Procedure(s):  ESOPHAGOGASTRODUODENOSCOPY, WITH BIOPSY       Diagnosis: Gastroesophageal reflux disease without esophagitis [K21.9]  Diagnosis Additional Information: No value filed.    Anesthesia Type:   MAC     Note:    Oropharynx: oropharynx clear of all foreign objects and spontaneously breathing  Level of Consciousness: drowsy  Oxygen Supplementation: face mask  Level of Supplemental Oxygen (L/min / FiO2): 6  Independent Airway: airway patency satisfactory and stable  Dentition: dentition unchanged  Vital Signs Stable: post-procedure vital signs reviewed and stable  Report to RN Given: handoff report given  Patient transferred to: Phase II    Handoff Report: Identifed the Patient, Identified the Reponsible Provider, Reviewed the pertinent medical history, Discussed the surgical course, Reviewed Intra-OP anesthesia mangement and issues during anesthesia, Set expectations for post-procedure period and Allowed opportunity for questions and acknowledgement of understanding      Vitals:  Vitals Value Taken Time   /80 10/31/23 1117   Temp 36.1  C (96.9  F) 10/31/23 1117   Pulse 63 10/31/23 1117   Resp 18 10/31/23 1117   SpO2 96 % 10/31/23 1117       Electronically Signed By: RAKESH Santamaria CRNA  October 31, 2023  11:18 AM

## 2023-10-31 NOTE — ANESTHESIA POSTPROCEDURE EVALUATION
Patient: Arthur Lawson    Procedure: Procedure(s):  ESOPHAGOGASTRODUODENOSCOPY, WITH BIOPSY       Anesthesia Type:  MAC    Note:  Disposition: Outpatient   Postop Pain Control: Uneventful            Sign Out: Well controlled pain   PONV: No   Neuro/Psych: Uneventful            Sign Out: Acceptable/Baseline neuro status   Airway/Respiratory: Uneventful            Sign Out: Acceptable/Baseline resp. status   CV/Hemodynamics: Uneventful            Sign Out: Acceptable CV status; No obvious hypovolemia; No obvious fluid overload   Other NRE: NONE   DID A NON-ROUTINE EVENT OCCUR? No           Last vitals:  Vitals Value Taken Time   /87 10/31/23 1152   Temp 96.9  F (36.1  C) 10/31/23 1117   Pulse 55 10/31/23 1154   Resp 16 10/31/23 1145   SpO2 95 % 10/31/23 1154   Vitals shown include unfiled device data.    Electronically Signed By: Alex Caicedo MD  October 31, 2023  12:13 PM

## 2023-10-31 NOTE — ANESTHESIA PREPROCEDURE EVALUATION
Anesthesia Pre-Procedure Evaluation    Patient: Arthur Lawson   MRN: [id number] : 1965        Procedure : Procedure(s):  ESOPHAGOGASTRODUODENOSCOPY, WITH BIOPSY          Past Medical History:   Diagnosis Date    Achilles bursitis or tendinitis     Created by Conversion     Anemia, unspecified     Created by Conversion     Gastroesophageal reflux disease     Gouty arthropathy, unspecified     Created by Conversion     Hiatal hernia     Motion sickness     Obese     Other chronic pain     Unspecified essential hypertension     Unspecified essential hypertension     Created by Conversion     Walking troubles       Past Surgical History:   Procedure Laterality Date    FINGER SURGERY      INGUINAL HERNIA REPAIR      OTHER SURGICAL HISTORY      left knee arthroscopy    REFRACTIVE SURGERY      ZZC NONSPECIFIC PROCEDURE      R inguinal herniorrhaphy      Allergies   Allergen Reactions    Penicillins      policillin      Social History     Tobacco Use    Smoking status: Never     Passive exposure: Past    Smokeless tobacco: Former     Types: Chew     Quit date:     Tobacco comments:     Chews.   Substance Use Topics    Alcohol use: Yes     Comment: Alcoholic Drinks/day: Socially      Wt Readings from Last 1 Encounters:   10/23/23 98.4 kg (217 lb)        Anesthesia Evaluation   Pt has had prior anesthetic.     No history of anesthetic complications       ROS/MED HX  ENT/Pulmonary:  - neg pulmonary ROS     Neurologic:  - neg neurologic ROS     Cardiovascular:     (+) Dyslipidemia hypertension- -   -  - -                                      METS/Exercise Tolerance:     Hematologic:  - neg hematologic  ROS     Musculoskeletal:  - neg musculoskeletal ROS     GI/Hepatic:     (+) GERD,     hiatal hernia,              Renal/Genitourinary:  - neg Renal ROS     Endo:     (+)               Obesity,       Psychiatric/Substance Use:  - neg psychiatric ROS     Infectious Disease:  - neg infectious disease ROS    "  Malignancy:  - neg malignancy ROS     Other:  - neg other ROS          Physical Exam    Airway  airway exam normal      Mallampati: II   TM distance: > 3 FB   Neck ROM: full   Mouth opening: > 3 cm    Respiratory Devices and Support         Dental  no notable dental history   Comment: Upper partial denture     (+) Modest Abnormalities - crowns, retainers, 1 or 2 missing teeth    B=Bridge, C=Chipped, L=Loose, M=Missing    Cardiovascular   cardiovascular exam normal       Rhythm and rate: regular and normal     Pulmonary   pulmonary exam normal        breath sounds clear to auscultation           OUTSIDE LABS:  CBC:   Lab Results   Component Value Date    WBC 5.0 10/23/2023    WBC 4.8 10/17/2023    HGB 13.3 10/23/2023    HGB 12.5 (L) 10/17/2023    HCT 39.0 (L) 10/23/2023    HCT 35.8 (L) 10/17/2023     10/23/2023     10/17/2023     BMP:   Lab Results   Component Value Date     09/03/2023     (L) 07/11/2023    POTASSIUM 3.9 09/03/2023    POTASSIUM 4.3 07/11/2023    CHLORIDE 102 09/03/2023    CHLORIDE 89 (L) 07/11/2023    CO2 27 09/03/2023    CO2 27 07/11/2023    BUN 11 09/03/2023    BUN 15.8 07/11/2023    CR 1.00 09/03/2023    CR 1.10 07/11/2023    GLC 94 09/03/2023    GLC 94 07/11/2023     COAGS: No results found for: \"PTT\", \"INR\", \"FIBR\"  POC: No results found for: \"BGM\", \"HCG\", \"HCGS\"  HEPATIC:   Lab Results   Component Value Date    ALBUMIN 4.2 09/03/2023    PROTTOTAL 7.3 09/03/2023    ALT 36 09/03/2023    AST 21 09/03/2023    ALKPHOS 59 09/03/2023    BILITOTAL 1.1 (H) 09/03/2023     OTHER:   Lab Results   Component Value Date    A1C 5.4 04/18/2023    DANNA 10.6 (H) 09/03/2023    MAG 1.6 (L) 09/03/2023    LIPASE 34 07/02/2018    TSH 1.23 04/18/2023       Anesthesia Plan    ASA Status:  2    NPO Status:  NPO Appropriate    Anesthesia Type: MAC.     - Reason for MAC: straight local not clinically adequate              Consents    Anesthesia Plan(s) and associated risks, benefits, and " realistic alternatives discussed. Questions answered and patient/representative(s) expressed understanding.     - Discussed:     - Discussed with:  Patient            Postoperative Care    Pain management: IV analgesics, Oral pain medications, Multi-modal analgesia.   PONV prophylaxis: Ondansetron (or other 5HT-3), Dexamethasone or Solumedrol, Droperidol or Haldol     Comments:                Alex Caicedo MD

## 2023-10-31 NOTE — INTERVAL H&P NOTE
"I have reviewed the surgical (or preoperative) H&P that is linked to this encounter, and examined the patient. There are no significant changes    Clinical Conditions Present on Arrival:  Clinically Significant Risk Factors Present on Admission                  # Obesity: Estimated body mass index is 30.27 kg/m  as calculated from the following:    Height as of 10/23/23: 1.803 m (5' 11\").    Weight as of 10/23/23: 98.4 kg (217 lb).     Plan for egd     Taj Blair [facility]  [phone]    "

## 2023-11-02 ENCOUNTER — HOSPITAL ENCOUNTER (OUTPATIENT)
Dept: CARDIOLOGY | Facility: CLINIC | Age: 58
Discharge: HOME | End: 2023-11-02
Attending: NURSE PRACTITIONER
Payer: COMMERCIAL

## 2023-11-02 DIAGNOSIS — R42 DIZZINESS: ICD-10-CM

## 2023-11-02 LAB — LVEF ECHO: NORMAL

## 2023-11-02 PROCEDURE — 93306 TTE W/DOPPLER COMPLETE: CPT

## 2023-11-02 PROCEDURE — 93306 TTE W/DOPPLER COMPLETE: CPT | Mod: 26 | Performed by: INTERNAL MEDICINE

## 2023-11-02 PROCEDURE — 93270 REMOTE 30 DAY ECG REV/REPORT: CPT

## 2023-11-06 ENCOUNTER — OFFICE VISIT (OUTPATIENT)
Dept: SURGERY | Facility: CLINIC | Age: 58
End: 2023-11-06
Payer: COMMERCIAL

## 2023-11-06 DIAGNOSIS — K44.9 HIATAL HERNIA: Primary | ICD-10-CM

## 2023-11-06 PROCEDURE — 99212 OFFICE O/P EST SF 10 MIN: CPT | Performed by: SURGERY

## 2023-11-06 NOTE — PROGRESS NOTES
Discussed EGD results.  No changes cellularity visible hiatal hernia explained his symptoms told him to cut down on soda follow-up as needed does not want surgery.     HPI: Arthur Lawson is here for follow up after his EGD.    Allergies, Medications, Social History, Past Medical History and Past Surgical History were reviewed and are noted in the chart.    There were no vitals taken for this visit.  There is no height or weight on file to calculate BMI.      EXAM:   GENERAL: Appears well           Assessment/Plan: Arthur Lawson continues to do well. His EGD results and the biopsies were discussed.  We discussed the pathophysiology of a hiatal hernia in the setting of reflux.  Given the fact that his symptoms are relatively mild we will plan on dietary, lifestyle medical management strategies for the time being.  He we will modify his diet and see if he can cut down on his soda intake in order to minimize his reflux.  I think this is reasonable and he will follow-up with me on an as-needed basis if his reflux worsens, requires dosage escalation or has any other symptoms relating to his hiatal hernia.        Taj Blair DO [facility]  [phone]

## 2023-11-06 NOTE — LETTER
11/6/2023         RE: Arthur Lawson  [address] [place] [address]        Dear Colleague,    Thank you for referring your patient, Arthur Lawson, to the [facility] [place]. Please see a copy of my visit note below.    Discussed EGD results.  No changes cellularity visible hiatal hernia explained his symptoms told him to cut down on soda follow-up as needed does not want surgery.     HPI: Arthur Lawson is here for follow up after his EGD.    Allergies, Medications, Social History, Past Medical History and Past Surgical History were reviewed and are noted in the chart.    There were no vitals taken for this visit.  There is no height or weight on file to calculate BMI.      EXAM:   GENERAL: Appears well           Assessment/Plan: Arthur Lawson continues to do well. His EGD results and the biopsies were discussed.  We discussed the pathophysiology of a hiatal hernia in the setting of reflux.  Given the fact that his symptoms are relatively mild we will plan on dietary, lifestyle medical management strategies for the time being.  He we will modify his diet and see if he can cut down on his soda intake in order to minimize his reflux.  I think this is reasonable and he will follow-up with me on an as-needed basis if his reflux worsens, requires dosage escalation or has any other symptoms relating to his hiatal hernia.        Taj Blair DO [facility]  [phone]      Again, thank you for allowing me to participate in the care of your patient.        Sincerely,        Gagan Blair, DO

## 2023-11-13 PROCEDURE — 93272 ECG/REVIEW INTERPRET ONLY: CPT | Performed by: INTERNAL MEDICINE

## 2023-11-20 ENCOUNTER — TRANSFERRED RECORDS (OUTPATIENT)
Dept: HEALTH INFORMATION MANAGEMENT | Facility: CLINIC | Age: 58
End: 2023-11-20
Payer: COMMERCIAL

## 2023-12-04 ENCOUNTER — OFFICE VISIT (OUTPATIENT)
Dept: INTERNAL MEDICINE | Facility: CLINIC | Age: 58
End: 2023-12-04
Payer: COMMERCIAL

## 2023-12-04 VITALS
BODY MASS INDEX: 31.36 KG/M2 | RESPIRATION RATE: 16 BRPM | HEIGHT: 71 IN | WEIGHT: 224 LBS | DIASTOLIC BLOOD PRESSURE: 80 MMHG | SYSTOLIC BLOOD PRESSURE: 126 MMHG | HEART RATE: 55 BPM | TEMPERATURE: 98.5 F | OXYGEN SATURATION: 97 %

## 2023-12-04 DIAGNOSIS — I10 ESSENTIAL HYPERTENSION: ICD-10-CM

## 2023-12-04 DIAGNOSIS — M54.50 LUMBAR BACK PAIN: ICD-10-CM

## 2023-12-04 DIAGNOSIS — R42 DIZZINESS: Primary | ICD-10-CM

## 2023-12-04 DIAGNOSIS — Z01.818 PREOPERATIVE EXAMINATION: ICD-10-CM

## 2023-12-04 LAB
ANION GAP SERPL CALCULATED.3IONS-SCNC: 10 MMOL/L (ref 7–15)
BUN SERPL-MCNC: 7.9 MG/DL (ref 8–23)
CALCIUM SERPL-MCNC: 10.3 MG/DL (ref 8.8–10.2)
CHLORIDE SERPL-SCNC: 105 MMOL/L (ref 98–107)
CREAT SERPL-MCNC: 0.89 MG/DL (ref 0.67–1.17)
DEPRECATED HCO3 PLAS-SCNC: 26 MMOL/L (ref 22–29)
EGFRCR SERPLBLD CKD-EPI 2021: >90 ML/MIN/1.73M2
GLUCOSE SERPL-MCNC: 87 MG/DL (ref 70–99)
POTASSIUM SERPL-SCNC: 3.9 MMOL/L (ref 3.4–5.3)
SODIUM SERPL-SCNC: 141 MMOL/L (ref 135–145)

## 2023-12-04 PROCEDURE — 80048 BASIC METABOLIC PNL TOTAL CA: CPT | Performed by: NURSE PRACTITIONER

## 2023-12-04 PROCEDURE — 36415 COLL VENOUS BLD VENIPUNCTURE: CPT | Performed by: NURSE PRACTITIONER

## 2023-12-04 PROCEDURE — 99214 OFFICE O/P EST MOD 30 MIN: CPT | Performed by: NURSE PRACTITIONER

## 2023-12-04 RX ORDER — PROPRANOLOL HCL 60 MG
60 CAPSULE, EXTENDED RELEASE 24HR ORAL DAILY
COMMUNITY
Start: 2023-11-20

## 2023-12-04 NOTE — PATIENT INSTRUCTIONS
Hold all supplements, aspirin and NSAIDs for 7 days prior to surgery.    Do not take your lisinopril in the morning of surgery.     Follow your surgeon's direction on when to stop eating and drinking prior to surgery.    Your surgeon will be managing your pain after your surgery.      Remove all jewelry and metal piercings before your surgery.     Remove nail polish from fingers before surgery.    If you use a CPAP machine, bring this with you to surgery.

## 2023-12-04 NOTE — PROGRESS NOTES
[facility]  [address] [address]  Phone: [phone]  Fax: [phone]  Primary Provider: Jai Garvin  Pre-op Performing Provider: JAI GARVIN      PREOPERATIVE EVALUATION:  Today's date: 12/4/2023    Arthur is a 58 year old, presenting for the following:  Pre-Op Exam (Microdiscectomy L4-5)        12/4/2023    11:04 AM   Additional Questions   Accompanied by Nikkie - wife       Surgical Information  Surgery/Procedure: Microdiscectomy L4-5  Surgery Location: [facility]  Surgeon: Dr Dylan Reynolds  Surgery Date: 12-  Time of Surgery: ?  Where patient plans to recover: At home with family  Fax number for surgical facility: [phone]    Assessment & Plan     The proposed surgical procedure is considered LOW risk.    Preoperative examination  No contraindications for planned procedure. He had an EKG done this past September, personally reviewed by me and it was normal sinus rhythm with no ST changes    Lumbar back pain  Plans to move forward with microdiscectomy at L4-L5    Dizziness  Unclear etiology.  He saw neurology at [facility] but thinks it is time to see someone down at [facility].  I will place a referral for him  - Adult Neurology  Referral; Future    Essential hypertension  Controlled on lisinopril and amlodipine.  He uses propranolol for essential tremor.  He can hold his lisinopril on the morning of his procedure    Patient Instructions   Hold all supplements, aspirin and NSAIDs for 7 days prior to surgery.    Do not take your lisinopril in the morning of surgery.     Follow your surgeon's direction on when to stop eating and drinking prior to surgery.    Your surgeon will be managing your pain after your surgery.      Remove all jewelry and metal piercings before your surgery.     Remove nail polish from fingers before surgery.    If you use a CPAP machine, bring this with you to surgery.              - No  identified additional risk factors other than previously addressed    Antiplatelet or Anticoagulation Medication Instructions:   - Patient is on no antiplatelet or anticoagulation medications.    Additional Medication Instructions:  As above    RECOMMENDATION:  APPROVAL GIVEN to proceed with proposed procedure, without further diagnostic evaluation.        Subjective       HPI related to upcoming procedure: As above        11/27/2023    12:04 PM   Preop Questions   1. Have you ever had a heart attack or stroke? No   2. Have you ever had surgery on your heart or blood vessels, such as a stent placement, a coronary artery bypass, or surgery on an artery in your head, neck, heart, or legs? No   3. Do you have chest pain with activity? No   4. Do you have a history of  heart failure? No   5. Do you currently have a cold, bronchitis or symptoms of other infection? No   6. Do you have a cough, shortness of breath, or wheezing? No   7. Do you or anyone in your family have previous history of blood clots? No   8. Do you or does anyone in your family have a serious bleeding problem such as prolonged bleeding following surgeries or cuts? No   9. Have you ever had problems with anemia or been told to take iron pills? No   10. Have you had any abnormal blood loss such as black, tarry or bloody stools? No   11. Have you ever had a blood transfusion? No   12. Are you willing to have a blood transfusion if it is medically needed before, during, or after your surgery? Yes   13. Have you or any of your relatives ever had problems with anesthesia? No   14. Do you have sleep apnea, excessive snoring or daytime drowsiness? No   15. Do you have any artifical heart valves or other implanted medical devices like a pacemaker, defibrillator, or continuous glucose monitor? No   16. Do you have artificial joints? No   17. Are you allergic to latex? No       Health Care Directive:  Patient does not have a Health Care Directive or Living Will:  Discussed advance care planning with patient; however, patient declined at this time.    Preoperative Review of :   reviewed - controlled substances reflected in medication list.      Status of Chronic Conditions:  See problem list for active medical problems.  Problems all longstanding and stable, except as noted/documented.  See ROS for pertinent symptoms related to these conditions.    Review of Systems  CONSTITUTIONAL: NEGATIVE for fever, chills, change in weight  INTEGUMENTARY/SKIN: NEGATIVE for worrisome rashes, moles or lesions  EYES: NEGATIVE for vision changes or irritation  ENT/MOUTH: NEGATIVE for ear, mouth and throat problems  RESP: NEGATIVE for significant cough or SOB  CV: NEGATIVE for chest pain, palpitations or peripheral edema  GI: NEGATIVE for nausea, abdominal pain, heartburn, or change in bowel habits  : NEGATIVE for frequency, dysuria, or hematuria  MUSCULOSKELETAL: NEGATIVE for significant arthralgias or myalgia  NEURO: NEGATIVE for weakness, dizziness or paresthesias  ENDOCRINE: NEGATIVE for temperature intolerance, skin/hair changes  HEME: NEGATIVE for bleeding problems  PSYCHIATRIC: NEGATIVE for changes in mood or affect    Patient Active Problem List    Diagnosis Date Noted    Gastroesophageal reflux disease without esophagitis 10/04/2023     Priority: Medium    Aneurysm of internal carotid artery 07/11/2023     Priority: Medium    Mixed dyslipidemia 04/18/2023     Priority: Medium    Serum calcium elevated 04/18/2023     Priority: Medium    Hip pain 04/01/2023     Priority: Medium    Overweight and obesity 06/14/2022     Priority: Medium    Benign neoplasm of transverse colon 05/16/2022     Priority: Medium    Epigastric pain 05/12/2022     Priority: Medium    Nausea 05/12/2022     Priority: Medium    Polyp of colon 05/12/2022     Priority: Medium    Gout 03/17/2022     Priority: Medium     Formatting of this note might be different from the original.  Created by  Conversion    Replacement Utility updated for latest IMO load      Delayed gastric emptying 05/01/2015     Priority: Medium    Indigestion 05/01/2015     Priority: Medium    Eosinophilic esophagitis 04/17/2015     Priority: Medium    Vitamin D deficiency 03/13/2015     Priority: Medium    Dizziness of unknown cause 01/01/2014     Priority: Medium    Essential hypertension 07/10/2003     Priority: Medium     Problem list name updated by automated process. Provider to review        Past Medical History:   Diagnosis Date    Achilles bursitis or tendinitis     Created by Conversion     Anemia, unspecified     Created by Conversion     Gastroesophageal reflux disease     Gouty arthropathy, unspecified     Created by Conversion     Hiatal hernia     Motion sickness     Obese     Other chronic pain     Unspecified essential hypertension     Unspecified essential hypertension     Created by Conversion     Walking troubles      Past Surgical History:   Procedure Laterality Date    ESOPHAGOSCOPY, GASTROSCOPY, DUODENOSCOPY (EGD), COMBINED N/A 10/31/2023    Procedure: ESOPHAGOGASTRODUODENOSCOPY, WITH BIOPSY;  Surgeon: Gagan Blair DO;  Location: [facility]    FINGER SURGERY      INGUINAL HERNIA REPAIR      OTHER SURGICAL HISTORY      left knee arthroscopy    REFRACTIVE SURGERY      ZZC NONSPECIFIC PROCEDURE  1990    R inguinal herniorrhaphy     Current Outpatient Medications   Medication Sig Dispense Refill    amLODIPine (NORVASC) 5 MG tablet TAKE 1 TABLET BY MOUTH EVERY DAY 90 tablet 0    lisinopril (ZESTRIL) 40 MG tablet Take 1 tablet (40 mg) by mouth daily 90 tablet 3    LORazepam (ATIVAN) 1 MG tablet Take 1 tablet (1 mg) by mouth every 6 hours as needed for vomiting or nausea (vertigo) 6 tablet 0    meclizine (ANTIVERT) 25 MG tablet Take 1 tablet (25 mg) by mouth 3 times daily as needed for dizziness 20 tablet 0    omeprazole (PRILOSEC) 20 MG DR capsule Take 20 mg by mouth daily      ondansetron (ZOFRAN) 4  "MG tablet Take 1 tablet (4 mg) by mouth every 8 hours as needed for nausea 90 tablet 0    propranolol ER (INDERAL LA) 60 MG 24 hr capsule Take 60 mg by mouth daily         Allergies   Allergen Reactions    Hydrochlorothiazide Unknown     Gout flair    Penicillins      policillin        Social History     Tobacco Use    Smoking status: Never     Passive exposure: Past    Smokeless tobacco: Former     Types: Chew     Quit date: 2021    Tobacco comments:     Chews.   Substance Use Topics    Alcohol use: Yes     Comment: Alcoholic Drinks/day: Socially     Family History   Problem Relation Age of Onset    Colon Cancer Father     Retinal detachment Father     Pacemaker Father     Retinal detachment Paternal Grandfather      History   Drug Use Unknown         Objective     /80 (BP Location: Right arm, Patient Position: Sitting, Cuff Size: Adult Regular)   Pulse 55   Temp 98.5  F (36.9  C)   Resp 16   Ht 1.803 m (5' 11\")   Wt 101.6 kg (224 lb)   SpO2 97%   BMI 31.24 kg/m      Physical Exam    GENERAL APPEARANCE: healthy, alert and no distress     EYES: EOMI,  PERRL     HENT: ear canals and TM's normal and nose and mouth without ulcers or lesions     NECK: no adenopathy, no asymmetry, masses, or scars and thyroid normal to palpation     RESP: lungs clear to auscultation - no rales, rhonchi or wheezes     CV: regular rates and rhythm, normal S1 S2, no S3 or S4 and no murmur, click or rub     ABDOMEN:  soft, nontender, no HSM or masses and bowel sounds normal     MS: extremities normal- no gross deformities noted, no evidence of inflammation in joints, FROM in all extremities.     SKIN: no suspicious lesions or rashes     NEURO: Normal strength and tone, sensory exam grossly normal, mentation intact and speech normal     PSYCH: mentation appears normal. and affect normal/bright     LYMPHATICS: No cervical adenopathy    Recent Labs   Lab Test 10/23/23  1411 10/17/23  1334 09/03/23  1633 07/11/23  0756 " 07/02/23  1632 04/18/23  0854 02/17/22  0842 02/17/22  0842   HGB 13.3 12.5* 13.4  --   --  13.1*  --  13.9    286 315  --   --  238  --  248   NA  --   --  138 129*   < > 142  --  139   POTASSIUM  --   --  3.9 4.3   < > 4.2   < > 4.2   CR  --   --  1.00 1.10   < > 0.96  --  0.87   A1C  --   --   --   --   --  5.4  --  5.5    < > = values in this interval not displayed.        Diagnostics:  Labs pending at this time.  Results will be reviewed when available.       Revised Cardiac Risk Index (RCRI):  The patient has the following serious cardiovascular risks for perioperative complications:   - No serious cardiac risks = 0 points     RCRI Interpretation: 1 point: Class II (low risk - 0.9% complication rate)         Signed Electronically by: Jai Garvin CNP  Copy of this evaluation report is provided to requesting physician.

## 2023-12-29 ENCOUNTER — TRANSFERRED RECORDS (OUTPATIENT)
Dept: HEALTH INFORMATION MANAGEMENT | Facility: CLINIC | Age: 58
End: 2023-12-29
Payer: COMMERCIAL

## 2024-01-23 ENCOUNTER — TRANSFERRED RECORDS (OUTPATIENT)
Dept: HEALTH INFORMATION MANAGEMENT | Facility: CLINIC | Age: 59
End: 2024-01-23
Payer: COMMERCIAL

## 2024-02-01 DIAGNOSIS — I10 ESSENTIAL HYPERTENSION: ICD-10-CM

## 2024-02-01 RX ORDER — AMLODIPINE BESYLATE 5 MG/1
5 TABLET ORAL DAILY
Qty: 90 TABLET | Refills: 1 | Status: SHIPPED | OUTPATIENT
Start: 2024-02-01 | End: 2024-08-22

## 2024-02-19 DIAGNOSIS — I10 ESSENTIAL (PRIMARY) HYPERTENSION: ICD-10-CM

## 2024-02-20 RX ORDER — LISINOPRIL 40 MG/1
40 TABLET ORAL DAILY
Qty: 90 TABLET | Refills: 3 | OUTPATIENT
Start: 2024-02-20

## 2024-03-03 ENCOUNTER — HEALTH MAINTENANCE LETTER (OUTPATIENT)
Age: 59
End: 2024-03-03

## 2024-04-05 ENCOUNTER — TELEPHONE (OUTPATIENT)
Dept: INTERNAL MEDICINE | Facility: CLINIC | Age: 59
End: 2024-04-05
Payer: COMMERCIAL

## 2024-04-05 NOTE — TELEPHONE ENCOUNTER
Forms/Letter Request    Type of form/letter: Disability      Do we have the form/letter: Yes: disability form    Who is the form from? Patient    Where did/will the form come from? Patient or family brought in       When is form/letter needed by: 04/12/2024    How would you like the form/letter returned:     Patient Notified form requests are processed in 5-7 business days:Yes    Could we send this information to you in [facility] or would you prefer to receive a phone call?:   Patient would prefer a phone call   Okay to leave a detailed message?: Yes at Home number on file [phone] (home)

## 2024-04-09 ENCOUNTER — TELEPHONE (OUTPATIENT)
Dept: INTERNAL MEDICINE | Facility: CLINIC | Age: 59
End: 2024-04-09
Payer: COMMERCIAL

## 2024-04-09 NOTE — TELEPHONE ENCOUNTER
LMOM that pt needs to call and schedule a video visit with Adeel.  Said that he has openings tomorrow and Thursday

## 2024-04-09 NOTE — TELEPHONE ENCOUNTER
I received some paperwork for short-term disability.  Have patient's schedule virtual telephone visit with me sometime this week so we can do this together and make sure it is done correctly

## 2024-04-11 ENCOUNTER — MYC MEDICAL ADVICE (OUTPATIENT)
Dept: INTERNAL MEDICINE | Facility: CLINIC | Age: 59
End: 2024-04-11

## 2024-04-11 ENCOUNTER — VIRTUAL VISIT (OUTPATIENT)
Dept: INTERNAL MEDICINE | Facility: CLINIC | Age: 59
End: 2024-04-11
Payer: COMMERCIAL

## 2024-04-11 DIAGNOSIS — R26.9 GAIT DISTURBANCE: Primary | ICD-10-CM

## 2024-04-11 DIAGNOSIS — R42 DIZZINESS: Primary | ICD-10-CM

## 2024-04-11 PROCEDURE — 99213 OFFICE O/P EST LOW 20 MIN: CPT | Mod: 95 | Performed by: NURSE PRACTITIONER

## 2024-04-11 NOTE — PROGRESS NOTES
"Arthur is a 58 year old who is being evaluated via a billable video visit.    How would you like to obtain your AVS? MyChart  If the video visit is dropped, the invitation should be resent by: Text to cell phone: [phone]  Will anyone else be joining your video visit? No      Assessment & Plan     Dizziness  He continues to struggle mightily with his dizziness symptoms.  Now being followed by [facility] neurology group.    He cannot work due to the severity of his symptoms.  He cannot drive.  He cannot stare at a computer screen or manage any type of manual equipment.    Ambulation is difficult at times.    We summarized his symptoms, gathered his specialty appointment documentation from [facility] and faxed this along with his disability paperwork.    I did place [facility] [place].  [facility] is requesting that we pursue a cervical MRI.  This was ordered for him as well.  - Physical Therapy  Referral; Future            BMI  Estimated body mass index is 31.24 kg/m  as calculated from the following:    Height as of 12/4/23: 1.803 m (5' 11\").    Weight as of 12/4/23: 101.6 kg (224 lb).             Subjective   Arthur is a 58 year old, presenting for the following health issues:  Forms (Fill out paperwork for disability)      4/11/2024    11:38 AM   Additional Questions   Roomed by Lisa ZEE     History of Present Illness       Reason for visit:  Fill out disability paperwork    He eats 2-3 servings of fruits and vegetables daily.He consumes 0 sweetened beverage(s) daily.He exercises with enough effort to increase his heart rate 20 to 29 minutes per day.  He exercises with enough effort to increase his heart rate 3 or less days per week.   He is taking medications regularly.                   Objective           Vitals:  No vitals were obtained today due to virtual visit.    Physical Exam   GENERAL: alert and no distress  EYES: Eyes grossly normal to inspection.  No " discharge or erythema, or obvious scleral/conjunctival abnormalities.  RESP: No audible wheeze, cough, or visible cyanosis.    SKIN: Visible skin clear. No significant rash, abnormal pigmentation or lesions.  NEURO: Cranial nerves grossly intact.  Mentation and speech appropriate for age.  PSYCH: Appropriate affect, tone, and pace of words          Video-Visit Details    Type of service:  Video Visit   Originating Location (pt. Location): Home    Distant Location (provider location):  On-site  Platform used for Video Visit: Maxx  Signed Electronically by: Jai Garvin, CNP

## 2024-04-22 ENCOUNTER — OFFICE VISIT (OUTPATIENT)
Dept: INTERNAL MEDICINE | Facility: CLINIC | Age: 59
End: 2024-04-22
Payer: COMMERCIAL

## 2024-04-22 VITALS
HEIGHT: 71 IN | BODY MASS INDEX: 31.64 KG/M2 | RESPIRATION RATE: 16 BRPM | TEMPERATURE: 98.2 F | SYSTOLIC BLOOD PRESSURE: 136 MMHG | DIASTOLIC BLOOD PRESSURE: 86 MMHG | OXYGEN SATURATION: 98 % | WEIGHT: 226 LBS | HEART RATE: 56 BPM

## 2024-04-22 DIAGNOSIS — I10 ESSENTIAL HYPERTENSION: ICD-10-CM

## 2024-04-22 DIAGNOSIS — E78.2 MIXED DYSLIPIDEMIA: ICD-10-CM

## 2024-04-22 DIAGNOSIS — R42 DIZZINESS: Primary | ICD-10-CM

## 2024-04-22 DIAGNOSIS — H81.8X9 PERSISTENT POSTURAL-PERCEPTUAL DIZZINESS: ICD-10-CM

## 2024-04-22 PROCEDURE — G2211 COMPLEX E/M VISIT ADD ON: HCPCS | Performed by: NURSE PRACTITIONER

## 2024-04-22 PROCEDURE — 99214 OFFICE O/P EST MOD 30 MIN: CPT | Performed by: NURSE PRACTITIONER

## 2024-04-22 RX ORDER — ATORVASTATIN CALCIUM 40 MG/1
40 TABLET, FILM COATED ORAL DAILY
Qty: 90 TABLET | Refills: 1 | Status: SHIPPED | OUTPATIENT
Start: 2024-04-22 | End: 2024-08-22

## 2024-04-22 RX ORDER — SERTRALINE HYDROCHLORIDE 25 MG/1
25 TABLET, FILM COATED ORAL DAILY
Qty: 90 TABLET | Refills: 1 | Status: SHIPPED | OUTPATIENT
Start: 2024-04-22 | End: 2024-06-14

## 2024-04-22 NOTE — PROGRESS NOTES
Assessment & Plan     Persistent postural-perceptual dizziness  Diagnosed at [facility].  Recommended to start SSRI.  We will go with sertraline.  Goal is to get him to therapeutic dose (usually between 50 and 150 mg daily).  He is [facility] the [facility].  See patient instructions below.    He does have follow-up with [facility] in about 1 month    Mixed dyslipidemia  May have noticed some microvascular changes on his MRI of the brain.  Recommended he start atorvastatin.  This was provided for him today.  Recheck cholesterol labs in 2 months, fasting    - atorvastatin (LIPITOR) 40 MG tablet; Take 1 tablet (40 mg) by mouth daily  - Lipid Profile (Chol, Trig, HDL, LDL calc); Future  - Comprehensive metabolic panel; Future    Essential hypertension  Controlled on lisinopril and amlodipine    The longitudinal plan of care for the diagnosis(es)/condition(s) as documented were addressed during this visit. Due to the added complexity in care, I will continue to support Arthur in the subsequent management and with ongoing continuity of care.     Patient Instructions   Start sertraline 25 mg daily. After two weeks, increase to 50 mg daily. After 4 weeks, increase to 100 mg daily.     Start atorvastatin 40 mg daily. Take in the evening. Kishan on the lookout for myalgias. We can recheck your cholesterol labs in 8 weeks.       Subjective   Arthur is a 58 year old, presenting for the following health issues:  Follow Up (Follow up [facility] visit for dizziness)      4/22/2024     7:21 AM   Additional Questions   Accompanied by Wife - Nikkie     Here for follow-up to discuss his dizziness symptoms.  He has been working down at the [facility] and was recently diagnosed with PPPD.    Male recommended that we consider SSRI therapy.      Objective    /86 (BP Location: Right arm, Patient Position: Sitting, Cuff Size: Adult Regular)   Pulse 56    "Temp 98.2  F (36.8  C)   Resp 16   Ht 1.803 m (5' 11\")   Wt 102.5 kg (226 lb)   SpO2 98%   BMI 31.52 kg/m    Body mass index is 31.52 kg/m .  Physical Exam   Patient healthy appearing and in no acute distress        Signed Electronically by: Jai Garvin CNP    "

## 2024-04-22 NOTE — PATIENT INSTRUCTIONS
Start sertraline 25 mg daily. After two weeks, increase to 50 mg daily. After 4 weeks, increase to 100 mg daily.     Start atorvastatin 40 mg daily. Take in the evening. Kishan on the lookout for myalgias. We can recheck your cholesterol labs in 8 weeks.

## 2024-04-29 DIAGNOSIS — R42 DIZZINESS: Primary | ICD-10-CM

## 2024-05-03 ENCOUNTER — MEDICAL CORRESPONDENCE (OUTPATIENT)
Dept: HEALTH INFORMATION MANAGEMENT | Facility: CLINIC | Age: 59
End: 2024-05-03
Payer: COMMERCIAL

## 2024-05-09 ENCOUNTER — MYC MEDICAL ADVICE (OUTPATIENT)
Dept: INTERNAL MEDICINE | Facility: CLINIC | Age: 59
End: 2024-05-09
Payer: COMMERCIAL

## 2024-05-09 DIAGNOSIS — F40.240 CLAUSTROPHOBIA: Primary | ICD-10-CM

## 2024-05-09 RX ORDER — DIAZEPAM 5 MG
5 TABLET ORAL EVERY 6 HOURS PRN
Qty: 1 TABLET | Refills: 0 | Status: SHIPPED | OUTPATIENT
Start: 2024-05-09 | End: 2024-08-22

## 2024-05-09 NOTE — TELEPHONE ENCOUNTER
OV 4/22     Persistent postural-perceptual dizziness  Diagnosed at [facility].  Recommended to start SSRI.  We will go with sertraline.  Goal is to get him to therapeutic dose (usually between 50 and 150 mg daily).  He is in the [facility].  See patient instructions below.     He does have follow-up with [facility] in about 1 month     Start sertraline 25 mg daily. After two weeks, increase to 50 mg daily. After 4 weeks, increase to 100 mg daily.

## 2024-05-16 ENCOUNTER — TRANSFERRED RECORDS (OUTPATIENT)
Dept: HEALTH INFORMATION MANAGEMENT | Facility: CLINIC | Age: 59
End: 2024-05-16
Payer: COMMERCIAL

## 2024-05-18 DIAGNOSIS — I10 ESSENTIAL (PRIMARY) HYPERTENSION: ICD-10-CM

## 2024-05-20 RX ORDER — LISINOPRIL 40 MG/1
40 TABLET ORAL DAILY
Qty: 90 TABLET | Refills: 2 | Status: SHIPPED | OUTPATIENT
Start: 2024-05-20

## 2024-05-21 ENCOUNTER — TRANSFERRED RECORDS (OUTPATIENT)
Dept: HEALTH INFORMATION MANAGEMENT | Facility: CLINIC | Age: 59
End: 2024-05-21
Payer: COMMERCIAL

## 2024-06-06 DIAGNOSIS — I10 ESSENTIAL HYPERTENSION: ICD-10-CM

## 2024-06-06 RX ORDER — AMLODIPINE BESYLATE 5 MG/1
5 TABLET ORAL DAILY
Qty: 90 TABLET | Refills: 1 | OUTPATIENT
Start: 2024-06-06

## 2024-06-13 ENCOUNTER — MYC MEDICAL ADVICE (OUTPATIENT)
Dept: INTERNAL MEDICINE | Facility: CLINIC | Age: 59
End: 2024-06-13
Payer: COMMERCIAL

## 2024-06-13 ENCOUNTER — MYC REFILL (OUTPATIENT)
Dept: INTERNAL MEDICINE | Facility: CLINIC | Age: 59
End: 2024-06-13
Payer: COMMERCIAL

## 2024-06-13 DIAGNOSIS — R42 DIZZINESS: ICD-10-CM

## 2024-06-13 RX ORDER — SERTRALINE HYDROCHLORIDE 25 MG/1
25 TABLET, FILM COATED ORAL DAILY
Qty: 90 TABLET | Refills: 1 | OUTPATIENT
Start: 2024-06-13

## 2024-06-14 RX ORDER — SERTRALINE HYDROCHLORIDE 100 MG/1
100 TABLET, FILM COATED ORAL DAILY
Qty: 90 TABLET | Refills: 3 | Status: SHIPPED | OUTPATIENT
Start: 2024-06-14

## 2024-06-24 ENCOUNTER — LAB (OUTPATIENT)
Dept: LAB | Facility: CLINIC | Age: 59
End: 2024-06-24
Payer: COMMERCIAL

## 2024-06-24 DIAGNOSIS — E78.2 MIXED DYSLIPIDEMIA: ICD-10-CM

## 2024-06-24 LAB
ALBUMIN SERPL BCG-MCNC: 4.5 G/DL (ref 3.5–5.2)
ALP SERPL-CCNC: 121 U/L (ref 40–150)
ALT SERPL W P-5'-P-CCNC: 30 U/L (ref 0–70)
ANION GAP SERPL CALCULATED.3IONS-SCNC: 9 MMOL/L (ref 7–15)
AST SERPL W P-5'-P-CCNC: 27 U/L (ref 0–45)
BILIRUB SERPL-MCNC: 1.3 MG/DL
BUN SERPL-MCNC: 10.8 MG/DL (ref 8–23)
CALCIUM SERPL-MCNC: 10.7 MG/DL (ref 8.8–10.2)
CHLORIDE SERPL-SCNC: 104 MMOL/L (ref 98–107)
CHOLEST SERPL-MCNC: 134 MG/DL
CREAT SERPL-MCNC: 0.98 MG/DL (ref 0.67–1.17)
DEPRECATED HCO3 PLAS-SCNC: 25 MMOL/L (ref 22–29)
EGFRCR SERPLBLD CKD-EPI 2021: 88 ML/MIN/1.73M2
FASTING STATUS PATIENT QL REPORTED: YES
FASTING STATUS PATIENT QL REPORTED: YES
GLUCOSE SERPL-MCNC: 107 MG/DL (ref 70–99)
HDLC SERPL-MCNC: 27 MG/DL
LDLC SERPL CALC-MCNC: 67 MG/DL
NONHDLC SERPL-MCNC: 107 MG/DL
POTASSIUM SERPL-SCNC: 4 MMOL/L (ref 3.4–5.3)
PROT SERPL-MCNC: 7.1 G/DL (ref 6.4–8.3)
SODIUM SERPL-SCNC: 138 MMOL/L (ref 135–145)
TRIGL SERPL-MCNC: 202 MG/DL

## 2024-06-24 PROCEDURE — 80053 COMPREHEN METABOLIC PANEL: CPT

## 2024-06-24 PROCEDURE — 36415 COLL VENOUS BLD VENIPUNCTURE: CPT

## 2024-06-24 PROCEDURE — 80061 LIPID PANEL: CPT

## 2024-06-25 ENCOUNTER — TELEPHONE (OUTPATIENT)
Dept: INTERNAL MEDICINE | Facility: CLINIC | Age: 59
End: 2024-06-25
Payer: COMMERCIAL

## 2024-06-25 DIAGNOSIS — E83.52 HYPERCALCEMIA: Primary | ICD-10-CM

## 2024-07-19 ENCOUNTER — TRANSFERRED RECORDS (OUTPATIENT)
Dept: HEALTH INFORMATION MANAGEMENT | Facility: CLINIC | Age: 59
End: 2024-07-19
Payer: COMMERCIAL

## 2024-08-05 ENCOUNTER — MYC MEDICAL ADVICE (OUTPATIENT)
Dept: INTERNAL MEDICINE | Facility: CLINIC | Age: 59
End: 2024-08-05
Payer: COMMERCIAL

## 2024-08-12 ENCOUNTER — E-VISIT (OUTPATIENT)
Dept: INTERNAL MEDICINE | Facility: CLINIC | Age: 59
End: 2024-08-12
Payer: COMMERCIAL

## 2024-08-12 DIAGNOSIS — D22.9 ENLARGED SKIN MOLE: Primary | ICD-10-CM

## 2024-08-12 PROCEDURE — 99207 PR NON-BILLABLE SERV PER CHARTING: CPT | Performed by: INTERNAL MEDICINE

## 2024-08-15 ENCOUNTER — HOSPITAL ENCOUNTER (OUTPATIENT)
Dept: ULTRASOUND IMAGING | Facility: CLINIC | Age: 59
Discharge: HOME | End: 2024-08-15
Attending: FAMILY MEDICINE | Admitting: FAMILY MEDICINE
Payer: COMMERCIAL

## 2024-08-15 ENCOUNTER — OFFICE VISIT (OUTPATIENT)
Dept: FAMILY MEDICINE | Facility: CLINIC | Age: 59
End: 2024-08-15
Payer: COMMERCIAL

## 2024-08-15 VITALS
TEMPERATURE: 98.1 F | WEIGHT: 220 LBS | SYSTOLIC BLOOD PRESSURE: 183 MMHG | BODY MASS INDEX: 30.68 KG/M2 | HEART RATE: 52 BPM | OXYGEN SATURATION: 97 % | RESPIRATION RATE: 20 BRPM | DIASTOLIC BLOOD PRESSURE: 97 MMHG

## 2024-08-15 DIAGNOSIS — R10.11 RUQ ABDOMINAL PAIN: ICD-10-CM

## 2024-08-15 DIAGNOSIS — R10.11 RUQ ABDOMINAL PAIN: Primary | ICD-10-CM

## 2024-08-15 LAB
ALBUMIN SERPL BCG-MCNC: 4.4 G/DL (ref 3.5–5.2)
ALP SERPL-CCNC: 110 U/L (ref 40–150)
ALT SERPL W P-5'-P-CCNC: 25 U/L (ref 0–70)
ANION GAP SERPL CALCULATED.3IONS-SCNC: 10 MMOL/L (ref 7–15)
AST SERPL W P-5'-P-CCNC: 25 U/L (ref 0–45)
BASOPHILS # BLD AUTO: 0.1 10E3/UL (ref 0–0.2)
BASOPHILS NFR BLD AUTO: 1 %
BILIRUB SERPL-MCNC: 1.2 MG/DL
BUN SERPL-MCNC: 7.7 MG/DL (ref 8–23)
CALCIUM SERPL-MCNC: 10 MG/DL (ref 8.8–10.4)
CHLORIDE SERPL-SCNC: 103 MMOL/L (ref 98–107)
CREAT SERPL-MCNC: 0.97 MG/DL (ref 0.67–1.17)
EGFRCR SERPLBLD CKD-EPI 2021: 89 ML/MIN/1.73M2
EOSINOPHIL # BLD AUTO: 0.2 10E3/UL (ref 0–0.7)
EOSINOPHIL NFR BLD AUTO: 4 %
ERYTHROCYTE [DISTWIDTH] IN BLOOD BY AUTOMATED COUNT: 13.3 % (ref 10–15)
GLUCOSE SERPL-MCNC: 96 MG/DL (ref 70–99)
HCO3 SERPL-SCNC: 28 MMOL/L (ref 22–29)
HCT VFR BLD AUTO: 36.4 % (ref 40–53)
HGB BLD-MCNC: 12.6 G/DL (ref 13.3–17.7)
IMM GRANULOCYTES # BLD: 0 10E3/UL
IMM GRANULOCYTES NFR BLD: 1 %
LIPASE SERPL-CCNC: 32 U/L (ref 13–60)
LYMPHOCYTES # BLD AUTO: 1.4 10E3/UL (ref 0.8–5.3)
LYMPHOCYTES NFR BLD AUTO: 32 %
MCH RBC QN AUTO: 30.8 PG (ref 26.5–33)
MCHC RBC AUTO-ENTMCNC: 34.6 G/DL (ref 31.5–36.5)
MCV RBC AUTO: 89 FL (ref 78–100)
MONOCYTES # BLD AUTO: 0.4 10E3/UL (ref 0–1.3)
MONOCYTES NFR BLD AUTO: 10 %
NEUTROPHILS # BLD AUTO: 2.3 10E3/UL (ref 1.6–8.3)
NEUTROPHILS NFR BLD AUTO: 52 %
PLATELET # BLD AUTO: 224 10E3/UL (ref 150–450)
POTASSIUM SERPL-SCNC: 3.4 MMOL/L (ref 3.4–5.3)
PROT SERPL-MCNC: 6.8 G/DL (ref 6.4–8.3)
RBC # BLD AUTO: 4.09 10E6/UL (ref 4.4–5.9)
SODIUM SERPL-SCNC: 141 MMOL/L (ref 135–145)
WBC # BLD AUTO: 4.4 10E3/UL (ref 4–11)

## 2024-08-15 PROCEDURE — 83690 ASSAY OF LIPASE: CPT | Performed by: FAMILY MEDICINE

## 2024-08-15 PROCEDURE — 80053 COMPREHEN METABOLIC PANEL: CPT | Performed by: FAMILY MEDICINE

## 2024-08-15 PROCEDURE — 76705 ECHO EXAM OF ABDOMEN: CPT

## 2024-08-15 PROCEDURE — 99214 OFFICE O/P EST MOD 30 MIN: CPT | Performed by: FAMILY MEDICINE

## 2024-08-15 PROCEDURE — 36415 COLL VENOUS BLD VENIPUNCTURE: CPT | Performed by: FAMILY MEDICINE

## 2024-08-15 PROCEDURE — 85025 COMPLETE CBC W/AUTO DIFF WBC: CPT | Performed by: FAMILY MEDICINE

## 2024-08-15 NOTE — PROGRESS NOTES
Assessment:       RUQ abdominal pain  - US Abdomen Limited  - CBC with platelets differential  - Comprehensive metabolic panel  - Lipase       Plan:     Patient with 5-day history of right upper quadrant pain unclear etiology.  Differential includes hepatic etiology, cholelithiasis, cholecystitis, pancreatitis, gastritis, musculoskeletal, versus other.  No evidence of acute abdomen.  Will order a CBC, CMP, lipase.  Also ordered an ultrasound of his right upper quadrant for further evaluation.      CBC is notable for normal white blood count.  Patient is mildly anemic with a hemoglobin of 12.6 although this is not particularly unusual for him.  Ultrasound report reviewed showing no radiologic etiology for patient's discomfort.  Gallbladder appears normal with no stones.  Khushboo these results with patient over the phone.    Will await results of CMP and lipase and notify patient when we get them back.  If symptoms getting worse or not improving follow-up with PCP.          Subjective:       59 year old male presents for evaluation of a 5-day history of right upper quadrant pain.  Some mild radiation superiorly.  Denies any other abdominal pain.  Feels best when he stands up but is worse when he lies down or otherwise moves.  No other radiation.  He has had some mild nausea but no vomiting.  There is been no diarrhea.  No black or bloody stools.  Does not seem to be worse with eating.  He has had no fevers or chills.  He does not drink much alcohol at all.  No jaundice.    Patient Active Problem List   Diagnosis    Essential hypertension    Gout    Overweight and obesity    Vitamin D deficiency    Mixed dyslipidemia    Serum calcium elevated    Benign neoplasm of transverse colon    Delayed gastric emptying    Eosinophilic esophagitis    Epigastric pain    Hip pain    Indigestion    Nausea    Polyp of colon    Aneurysm of internal carotid artery    Dizziness of unknown cause    Gastroesophageal reflux disease without  esophagitis    Persistent postural-perceptual dizziness       Past Medical History:   Diagnosis Date    Achilles bursitis or tendinitis     Created by Conversion     Anemia, unspecified     Created by Conversion     Gastroesophageal reflux disease     Gouty arthropathy, unspecified     Created by Conversion     Hiatal hernia     Motion sickness     Obese     Other chronic pain     Unspecified essential hypertension     Unspecified essential hypertension     Created by Conversion     Walking troubles        Past Surgical History:   Procedure Laterality Date    ESOPHAGOSCOPY, GASTROSCOPY, DUODENOSCOPY (EGD), COMBINED N/A 10/31/2023    Procedure: ESOPHAGOGASTRODUODENOSCOPY, WITH BIOPSY;  Surgeon: Gagan Blair DO;  Location: [facility]    FINGER SURGERY      INGUINAL HERNIA REPAIR      OTHER SURGICAL HISTORY      left knee arthroscopy    REFRACTIVE SURGERY      ZZC NONSPECIFIC PROCEDURE  1990    R inguinal herniorrhaphy       Current Outpatient Medications   Medication Sig Dispense Refill    amLODIPine (NORVASC) 5 MG tablet Take 1 tablet (5 mg) by mouth daily 90 tablet 1    atorvastatin (LIPITOR) 40 MG tablet Take 1 tablet (40 mg) by mouth daily 90 tablet 1    diazepam (VALIUM) 5 MG tablet Take 1 tablet (5 mg) by mouth every 6 hours as needed (for MRI) 1 tablet 0    lisinopril (ZESTRIL) 40 MG tablet TAKE 1 TABLET BY MOUTH EVERY DAY 90 tablet 2    omeprazole (PRILOSEC) 20 MG DR capsule Take 20 mg by mouth daily      propranolol ER (INDERAL LA) 60 MG 24 hr capsule Take 60 mg by mouth daily      sertraline (ZOLOFT) 100 MG tablet Take 1 tablet (100 mg) by mouth daily 90 tablet 3     No current facility-administered medications for this visit.       Allergies   Allergen Reactions    Hydrochlorothiazide Unknown     Gout flair    Penicillins      policillin       Family History   Problem Relation Age of Onset    Colon Cancer Father     Retinal detachment Father     Pacemaker Father     Retinal detachment  Paternal Grandfather        Social History     Socioeconomic History    Marital status:    Tobacco Use    Smoking status: Never     Passive exposure: Past    Smokeless tobacco: Former     Types: Chew     Quit date: 2021    Tobacco comments:     Chews.   Vaping Use    Vaping status: Never Used   Substance and Sexual Activity    Alcohol use: Yes     Comment: Alcoholic Drinks/day: Socially    Drug use: Never     Social Determinants of Health     Financial Resource Strain: Low Risk  (11/27/2023)    Financial Resource Strain     Within the past 12 months, have you or your family members you live with been unable to get utilities (heat, electricity) when it was really needed?: No   Food Insecurity: No Food Insecurity (2/8/2024)    Received from [facility]    Hunger Vital Sign     Worried About Running Out of Food in the Last Year: Never true     Ran Out of Food in the Last Year: Never true   Transportation Needs: No Transportation Needs (2/8/2024)    Received from [facility]    PRAPARE - Transportation     Lack of Transportation (Medical): No     Lack of Transportation (Non-Medical): No   Physical Activity: Inactive (2/8/2024)    Received from [facility]    Exercise Vital Sign     Days of Exercise per Week: 0 days     Minutes of Exercise per Session: 0 min    Received from [facility], [facility] [employer] [facility]    Social Connections   Interpersonal Safety: Low Risk  (4/22/2024)    Interpersonal Safety     Do you feel physically and emotionally safe where you currently live?: Yes     Within the past 12 months, have you been hit, slapped, kicked or otherwise physically hurt by someone?: No     Within the past 12 months, have you been humiliated or emotionally abused in other ways by your partner or ex-partner?: No   Housing Stability: Low Risk  (2/8/2024)    Received from [facility]    Housing Stability      What is your living situation today?: I have a steady place to live         Review of Systems  Pertinent items are noted in HPI.      Objective:     BP (!) 183/97   Pulse 52   Temp 98.1  F (36.7  C)   Resp 20   Wt 99.8 kg (220 lb)   SpO2 97%   BMI 30.68 kg/m    General Appearance:    Alert, pleasant, cooperative, no distress, appears stated age   Head:    Normocephalic, without obvious abnormality, atraumatic   Eyes:    Conjunctiva/corneas clear   Ears:    Normal TM's without erythema or bulging. Normal external ear canals, both ears   Nose:   Nares normal, septum midline, mucosa normal, no drainage    or sinus tenderness   Throat:   Lips, mucosa, and tongue normal; teeth and gums normal.  No tonsilar hypertrophy or exudate.   Neck:    Cardiovascular:   Supple, symmetrical, trachea midline, no adenopathy;     thyroid:  no enlargement/tenderness/nodules  Regular rate and rhythm, no murmurs, rubs, or gallops.   Lungs:    Abdomen:      Extremities:  Skin:         Clear to auscultation bilaterally without wheezes, rales, or rhonchi, respirations unlabored  Soft, tender in the right upper quadrant without rebound or guarding.  No epigastric tenderness.  Bowel sounds present all 4 quadrants.  Warm and well perfused  No rashes, no jaundice                       Results for orders placed or performed in visit on 08/15/24   CBC with platelets differential     Status: None (In process)    Narrative    The following orders were created for panel order CBC with platelets differential.  Procedure                               Abnormality         Status                     ---------                               -----------         ------                     CBC with platelets and d...[[id number]]                      In process                   Please view results for these tests on the individual orders.       This note has been dictated using voice recognition software. Any grammatical or context distortions are  unintentional and inherent to the software

## 2024-08-19 ENCOUNTER — MYC MEDICAL ADVICE (OUTPATIENT)
Dept: INTERNAL MEDICINE | Facility: CLINIC | Age: 59
End: 2024-08-19
Payer: COMMERCIAL

## 2024-08-19 NOTE — TELEPHONE ENCOUNTER
08/19/2024    TC called and left Pt a voicemail that we could get him in with Jai Garvin colleague (Dr Candelaria) for a hospital follow up on 08/22/2024 @ 3:05 PM (arrival time).      TC requested that Pt call us back and let us know if that appointment will work for him?     Caroline Watson

## 2024-08-21 DIAGNOSIS — I10 ESSENTIAL HYPERTENSION: ICD-10-CM

## 2024-08-21 DIAGNOSIS — E78.2 MIXED DYSLIPIDEMIA: ICD-10-CM

## 2024-08-22 ENCOUNTER — OFFICE VISIT (OUTPATIENT)
Dept: INTERNAL MEDICINE | Facility: CLINIC | Age: 59
End: 2024-08-22
Payer: COMMERCIAL

## 2024-08-22 VITALS
SYSTOLIC BLOOD PRESSURE: 160 MMHG | TEMPERATURE: 98.3 F | HEIGHT: 71 IN | WEIGHT: 220 LBS | RESPIRATION RATE: 18 BRPM | OXYGEN SATURATION: 97 % | HEART RATE: 56 BPM | BODY MASS INDEX: 30.8 KG/M2 | DIASTOLIC BLOOD PRESSURE: 94 MMHG

## 2024-08-22 DIAGNOSIS — I10 ESSENTIAL HYPERTENSION: ICD-10-CM

## 2024-08-22 DIAGNOSIS — R10.11 RUQ ABDOMINAL PAIN: Primary | ICD-10-CM

## 2024-08-22 PROCEDURE — 99214 OFFICE O/P EST MOD 30 MIN: CPT | Performed by: INTERNAL MEDICINE

## 2024-08-22 RX ORDER — AMLODIPINE BESYLATE 5 MG/1
5 TABLET ORAL DAILY
Qty: 90 TABLET | Refills: 1 | Status: SHIPPED | OUTPATIENT
Start: 2024-08-22 | End: 2024-08-28

## 2024-08-22 RX ORDER — ATORVASTATIN CALCIUM 40 MG/1
40 TABLET, FILM COATED ORAL DAILY
Qty: 90 TABLET | Refills: 2 | Status: SHIPPED | OUTPATIENT
Start: 2024-08-22

## 2024-08-22 NOTE — PROGRESS NOTES
Assessment & Plan     RUQ abdominal pain  59-year-old male here to follow-up after urgent care evaluation for right upper quadrant abdominal pain.  He has been having pain in his right upper abdomen and right lower chest for the past 2 weeks.  The pain is persistent and can be moderately severe.  He has been taking acetaminophen and ibuprofen alternating throughout the day.  Providing minimal relief.  Workup in urgent care included labs showing mild anemia which has been a chronic finding but no other abnormalities including normal LFTs and normal lipase.  Right upper quadrant abdominal ultrasound showed no gallstones or biliary duct dilatation or other worrisome findings.  His pain is not related to meals.  He does have chronic nausea but this is unrelated to the new symptoms.  He takes omeprazole every day for reflux.  No change in bowel movements.  Interestingly, his pain seems to be worse when he lies on his left side and he gets relief on his right side.  The pain can also be influenced by movement raising question whether this is a musculoskeletal issue.  His exam with abdomen soft with some mild tenderness to deep palpation right upper quadrant.  There is no rash to suggest shingles.  Slight tenderness to palpation over lower right anterior ribs.  Denies any pain in his upper back that may be causing referred symptoms into his chest wall.  At this time, his diagnosis is unclear.  While this could represent peptic ulcer disease, it seems less likely as symptoms are not worsened or improved with food.  Nevertheless, I will have him try increasing omeprazole to 20 mg twice daily to see if this helps with symptoms.  He should also stop using ibuprofen which could exacerbate the problem.  This may be a musculoskeletal issue but there are no obvious causes for this to develop.  No recent injury or heavy lifting or other activity.  Given the uncertainty of his diagnosis and persistence of symptoms, despite the  "normal ultrasound I am recommending that he get a CT of his abdomen/pelvis completed to thoroughly evaluate the area.  - CT Abdomen Pelvis w Contrast; Future  - omeprazole (PRILOSEC) 20 MG DR capsule; Take 1 capsule (20 mg) by mouth 2 times daily.    Essential hypertension  Blood pressure is elevated today.  He takes amlodipine, lisinopril and propranolol.  I suspect the recent use of ibuprofen is contributing and he is instructed to discontinue use especially as it does not seem to be providing him any relief.  He will follow-up in 1 week to have his blood pressure rechecked with his PCP        MED REC REQUIRED  Post Medication Reconciliation Status:  Discharge medications reconciled and changed, see notes/orders  BMI  Estimated body mass index is 30.68 kg/m  as calculated from the following:    Height as of this encounter: 1.803 m (5' 11\").    Weight as of this encounter: 99.8 kg (220 lb).         Follow-up in 1 week with PCP    Mariza   Arthur is a 59 year old, presenting for the following health issues: See assessment and plan for details  Follow Up (UC, RUQ pain, 8/15/24, he had US that was negative and still having pain. ) and Hypertension (Blood pressure is running high since starting new medication atorvastatin and sertraline )        8/22/2024     3:12 PM   Additional Questions   Roomed by            Review of Systems  Constitutional, HEENT, cardiovascular, pulmonary, gi and gu systems are negative, except as otherwise noted.      Objective    BP (!) 160/94   Pulse 56   Temp 98.3  F (36.8  C) (Oral)   Resp 18   Ht 1.803 m (5' 11\")   Wt 99.8 kg (220 lb)   SpO2 97%   BMI 30.68 kg/m    Body mass index is 30.68 kg/m .  Physical Exam       Well-appearing middle-age male  Lungs clear bilaterally no rales or wheezes  Heart regular rate and rhythm  Abdomen soft with mild tenderness to deep palpation right upper quadrant with no masses felt and no rebound tenderness  Some discomfort with palpation over " lower right anterior ribs  No rash suggestive of zoster          Signed Electronically by: Kalyan Candelaria MD

## 2024-08-28 ENCOUNTER — OFFICE VISIT (OUTPATIENT)
Dept: INTERNAL MEDICINE | Facility: CLINIC | Age: 59
End: 2024-08-28
Payer: COMMERCIAL

## 2024-08-28 VITALS
DIASTOLIC BLOOD PRESSURE: 90 MMHG | HEART RATE: 52 BPM | RESPIRATION RATE: 16 BRPM | WEIGHT: 222 LBS | SYSTOLIC BLOOD PRESSURE: 144 MMHG | BODY MASS INDEX: 31.08 KG/M2 | HEIGHT: 71 IN | OXYGEN SATURATION: 96 %

## 2024-08-28 DIAGNOSIS — I10 ESSENTIAL HYPERTENSION: ICD-10-CM

## 2024-08-28 DIAGNOSIS — R42 DIZZINESS: ICD-10-CM

## 2024-08-28 DIAGNOSIS — E66.09 CLASS 1 OBESITY DUE TO EXCESS CALORIES WITH SERIOUS COMORBIDITY AND BODY MASS INDEX (BMI) OF 30.0 TO 30.9 IN ADULT: ICD-10-CM

## 2024-08-28 DIAGNOSIS — E66.811 CLASS 1 OBESITY DUE TO EXCESS CALORIES WITH SERIOUS COMORBIDITY AND BODY MASS INDEX (BMI) OF 30.0 TO 30.9 IN ADULT: ICD-10-CM

## 2024-08-28 DIAGNOSIS — R10.11 RUQ ABDOMINAL PAIN: Primary | ICD-10-CM

## 2024-08-28 PROCEDURE — 99214 OFFICE O/P EST MOD 30 MIN: CPT | Performed by: NURSE PRACTITIONER

## 2024-08-28 PROCEDURE — G2211 COMPLEX E/M VISIT ADD ON: HCPCS | Performed by: NURSE PRACTITIONER

## 2024-08-28 RX ORDER — AMLODIPINE BESYLATE 10 MG/1
10 TABLET ORAL DAILY
Qty: 90 TABLET | Refills: 1 | Status: SHIPPED | OUTPATIENT
Start: 2024-08-28

## 2024-08-28 NOTE — PROGRESS NOTES
"  Assessment & Plan     RUQ abdominal pain  He saw a colleague of mine last week for right upper quadrant abdominal pain.  A CT of the abdomen was ordered.  His abdominal pain has improved and he does not feel he needs the CT scan at this time.  We reviewed his urgent care workup, including unremarkable ultrasound and unremarkable CMP/lipase    Dizziness  He is working with the [facility] neurology group on this.  He is doing vestibular rehabilitation at the [place] [facility].  He is using sertraline for the BPPV which seems to be helping.  His dizziness has not completely resolved but it is better than it had been    Essential hypertension  Uncontrolled.  Increase amlodipine to 10 mg daily.  Continue on lisinopril.  He uses propranolol for tremor/dizziness.  Follow-up in 1 month  - amLODIPine (NORVASC) 10 MG tablet; Take 1 tablet (10 mg) by mouth daily.    Obesity  He has been drinking 2 soda pops per day.  I asked that he cut down on his dietary sugars.    Patient Instructions   I think we can hold off on the CT of the abdomen for now.    Increase amlodipine to 10 mg daily.  Take two 5 mg tablets until you run out of your amlodipine prescription.  The new prescription will be for 10 mg tablet moving forward.    You need to stop drinking soda.  This will help you lose weight.    Try to minimize salt.    Minimize ibuprofen is much as you can.    Cholesterol medication is working well for you right now.    Come back and see me in 1 month for blood pressure check      The longitudinal plan of care for the diagnosis(es)/condition(s) as documented were addressed during this visit. Due to the added complexity in care, I will continue to support Arthur in the subsequent management and with ongoing continuity of care.            BMI  Estimated body mass index is 30.96 kg/m  as calculated from the following:    Height as of this encounter: 1.803 m (5' 11\").    Weight as of this encounter: 100.7 kg (222 " "lb).             Subjective   Arthur is a 59 year old, presenting for the following health issues:  Follow Up (HTN recheck)      8/28/2024     6:53 AM   Additional Questions   Roomed by      History of Present Illness       Hypertension: He presents for follow up of hypertension.  He does not check blood pressure  regularly outside of the clinic. Outside blood pressures have been over 140/90. He does not follow a low salt diet.     He eats 0-1 servings of fruits and vegetables daily.He consumes 4 sweetened beverage(s) daily.He exercises with enough effort to increase his heart rate 9 or less minutes per day.  He exercises with enough effort to increase his heart rate 3 or less days per week.   He is taking medications regularly.                     Objective    BP (!) 144/90 (BP Location: Right arm, Patient Position: Sitting, Cuff Size: Adult Regular)   Pulse 52   Resp 16   Ht 1.803 m (5' 11\")   Wt 100.7 kg (222 lb)   SpO2 96%   BMI 30.96 kg/m    Body mass index is 30.96 kg/m .  Physical Exam   Pt is healthy appearing and in no acute distress              Signed Electronically by: Jai Garvin, STEFANIA    "

## 2024-08-28 NOTE — PATIENT INSTRUCTIONS
I think we can hold off on the CT of the abdomen for now.    Increase amlodipine to 10 mg daily.  Take two 5 mg tablets until you run out of your amlodipine prescription.  The new prescription will be for 10 mg tablet moving forward.    You need to stop drinking soda.  This will help you lose weight.    Try to minimize salt.    Minimize ibuprofen is much as you can.    Cholesterol medication is working well for you right now.    Come back and see me in 1 month for blood pressure check

## 2024-09-06 ENCOUNTER — TRANSFERRED RECORDS (OUTPATIENT)
Dept: HEALTH INFORMATION MANAGEMENT | Facility: CLINIC | Age: 59
End: 2024-09-06
Payer: COMMERCIAL

## 2024-10-22 ENCOUNTER — TRANSFERRED RECORDS (OUTPATIENT)
Dept: HEALTH INFORMATION MANAGEMENT | Facility: CLINIC | Age: 59
End: 2024-10-22
Payer: COMMERCIAL

## 2024-11-05 DIAGNOSIS — I10 ESSENTIAL (PRIMARY) HYPERTENSION: ICD-10-CM

## 2024-11-06 RX ORDER — LISINOPRIL 40 MG/1
40 TABLET ORAL DAILY
Qty: 90 TABLET | Refills: 2 | OUTPATIENT
Start: 2024-11-06

## 2024-11-12 ENCOUNTER — MYC REFILL (OUTPATIENT)
Dept: INTERNAL MEDICINE | Facility: CLINIC | Age: 59
End: 2024-11-12
Payer: COMMERCIAL

## 2024-11-12 DIAGNOSIS — G25.0 ESSENTIAL TREMOR: Primary | ICD-10-CM

## 2024-11-14 RX ORDER — PROPRANOLOL HYDROCHLORIDE 60 MG/1
60 CAPSULE, EXTENDED RELEASE ORAL DAILY
Qty: 90 CAPSULE | Refills: 1 | Status: SHIPPED | OUTPATIENT
Start: 2024-11-14

## 2024-12-12 ENCOUNTER — OFFICE VISIT (OUTPATIENT)
Dept: URGENT CARE | Facility: URGENT CARE | Age: 59
End: 2024-12-12
Payer: COMMERCIAL

## 2024-12-12 ENCOUNTER — TELEPHONE (OUTPATIENT)
Dept: URGENT CARE | Facility: URGENT CARE | Age: 59
End: 2024-12-12

## 2024-12-12 VITALS
OXYGEN SATURATION: 99 % | HEART RATE: 45 BPM | BODY MASS INDEX: 30.52 KG/M2 | DIASTOLIC BLOOD PRESSURE: 87 MMHG | WEIGHT: 218.8 LBS | RESPIRATION RATE: 24 BRPM | SYSTOLIC BLOOD PRESSURE: 143 MMHG | TEMPERATURE: 97.8 F

## 2024-12-12 DIAGNOSIS — M25.473 ANKLE SWELLING, UNSPECIFIED LATERALITY: ICD-10-CM

## 2024-12-12 DIAGNOSIS — M25.471 RIGHT ANKLE SWELLING: Primary | ICD-10-CM

## 2024-12-12 ASSESSMENT — ENCOUNTER SYMPTOMS
FATIGUE: 0
NAUSEA: 0
FEVER: 0
DIARRHEA: 0
WOUND: 0
SHORTNESS OF BREATH: 0
VOMITING: 0
COLOR CHANGE: 0
ABDOMINAL PAIN: 0

## 2024-12-12 NOTE — PATIENT INSTRUCTIONS
Regarding your rib pain, I think this is most likely muscular in nature.In general these types of things take 4-6 weeks to go away. Feel free to take tylenol or ibuprofen for it. I don't see the rib pain indicating anything serious.     Regarding the ankle swelling, it could be a side effect from your amlodipine, in which case the only fix for that is to change the medicine. It could also be from blood clots in the legs, which is why we're getting the ultrasound imaging of your legs.     If you notice that you are short of breath, have chest pain, or feel your heart beating out of your chest, then please go to the emergency room.

## 2024-12-12 NOTE — PROGRESS NOTES
Patient presents with:  Flank Pain: Right side rib pain that started a few weeks ago. Pain is consistent. Not noticeable more with eating or activity.   Is also having swelling in feet and legs that started this week. HX of gout      Clinical Decision Making:      ICD-10-CM    1. Right ankle swelling  M25.471       2. Ankle swelling, unspecified laterality  M25.473 US Lower Extremity Venous Duplex Bilateral      - Rib pain is likely MSK in nature given the change in positioning but no effect on breathing or GI. Monitor for 4-6 weeks  - Leg swelling ddx includes amlodipine SE, DVT (bozena with occupation), and heart failure. No orthopnea or JVD on exam. DVTs possible, though unusual in both legs. Will obtain ultrasound and make recommendations based on imaging results.    Patient Instructions   Regarding your rib pain, I think this is most likely muscular in nature.In general these types of things take 4-6 weeks to go away. Feel free to take tylenol or ibuprofen for it. I don't see the rib pain indicating anything serious.     Regarding the ankle swelling, it could be a side effect from your amlodipine, in which case the only fix for that is to change the medicine. It could also be from blood clots in the legs, which is why we're getting the ultrasound imaging of your legs.     If you notice that you are short of breath, have chest pain, or feel your heart beating out of your chest, then please go to the emergency room.    HPI:  Arthur Lawson is a 59 year old male who presents today complaining of ri pain and leg swelling    R rib pain has been present for the past 2 weeks. Improves when leaning forward. Mildly tender. No history of recent trauma. No recent URIs. No trouble breathing. No trouble eating, no nausea, or vomiting. Had hernia repair a few decades ago in R groin. Has a hiatal hernia with plans to monitor. Eating doesn't change the rib pain. Not taking any meds for it. Pain is sharp.     The leg swelling is  "what prompted his visit today. This started about a week ago. Started in both feet and ankles. Watched it since then. Was his day off today so came into clinic. 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 He is on amlodipine, has been on for a year. No personal heart history but it is present in the family, largely \"heart attacks\". No known family history of blood clots. No orthopnea.     History obtained from the patient.    Problem List:  2024-08: Class 1 obesity due to excess calories with serious   comorbidity and body mass index (BMI) of 30.0 to 30.9 in adult  2024-08: RUQ abdominal pain  2024-04: Persistent postural-perceptual dizziness  2023-10: Gastroesophageal reflux disease without esophagitis  2023-07: Aneurysm of internal carotid artery  2023-04: Mixed dyslipidemia  2023-04: Serum calcium elevated  2023-04: Hip pain  2022-06: Overweight and obesity  2022-05: Benign neoplasm of transverse colon  2022-05: Epigastric pain  2022-05: Nausea  2022-05: Polyp of colon  2022-03: Gout  2015-05: Delayed gastric emptying  2015-05: Indigestion  2015-04: Eosinophilic esophagitis  2015-03: Vitamin D deficiency  2014-01: Dizziness of unknown cause  2003-07: Essential hypertension      Past Medical History:   Diagnosis Date    Achilles bursitis or tendinitis     Created by Conversion     Anemia, unspecified     Created by Conversion     Gastroesophageal reflux disease     Gouty arthropathy, unspecified     Created by Conversion     Hiatal hernia     Motion sickness     Obese     Other chronic pain     Unspecified essential hypertension     Unspecified essential hypertension     Created by Conversion     Walking troubles        Social History     Tobacco Use    Smoking status: Never     Passive exposure: Past    Smokeless tobacco: Former     Types: Chew     Quit date: 2021    Tobacco comments:     Chews.   Substance Use Topics    Alcohol use: Yes     Comment: Alcoholic " Drinks/day: Socially       Review of Systems   Constitutional:  Negative for fatigue and fever.   HENT: Negative.     Respiratory:  Negative for shortness of breath.    Cardiovascular:  Negative for chest pain.        R subcostal pain   Gastrointestinal:  Negative for abdominal pain, diarrhea, nausea and vomiting.   Skin:  Negative for color change and wound.       Vitals:    12/12/24 1511   BP: (!) 143/87   Pulse: (!) 45   Resp: 24   Temp: 97.8  F (36.6  C)   TempSrc: Oral   SpO2: 99%   Weight: 99.2 kg (218 lb 12.8 oz)       Physical Exam  Constitutional:       General: He is not in acute distress.     Appearance: He is not toxic-appearing.   Neck:      Comments: No JVD at 45 degrees  Pulmonary:      Effort: Pulmonary effort is normal. No respiratory distress.      Breath sounds: Normal breath sounds. No wheezing.   Abdominal:      General: Abdomen is flat. There is no distension.      Palpations: Abdomen is soft. There is no mass.      Tenderness: There is no abdominal tenderness. There is no guarding or rebound.      Hernia: No hernia is present.      Comments: Negative leahy's sign   Musculoskeletal:         General: Swelling present. Normal range of motion.      Right lower leg: Edema present.      Left lower leg: Edema present.      Comments: +1 pitting edema in both legs up to mid-calf b/l. No tenderness or warmth   Skin:     General: Skin is warm and dry.   Neurological:      Mental Status: He is alert.         Results:  No results found for any visits on 12/12/24.      At the end of the encounter, I discussed results, diagnosis, medications. Discussed indications for follow up if no improvement. Patient understood and agreed to plan. Patient was stable for discharge.    Waldemar Palomino MD, MPH

## 2024-12-13 NOTE — TELEPHONE ENCOUNTER
Left message for patient to call back regarding lower extremity ultrasound results.  If patient calls back please let him know that ultrasound was negative for DVT.  Please follow-up with PCP if symptoms are persisting.    Aleisha Bain M.D. 12/12/2024 7:02 PM

## 2024-12-14 ENCOUNTER — TELEPHONE (OUTPATIENT)
Dept: URGENT CARE | Facility: URGENT CARE | Age: 59
End: 2024-12-14
Payer: COMMERCIAL

## 2024-12-14 NOTE — TELEPHONE ENCOUNTER
Rn left non detailed vm for pt.      Rn also sent detailed mychart msg for pt.       Pt's ultrasound is negative for a DVT. Please relay.    Linsey ZEE RN

## 2024-12-16 ENCOUNTER — MYC MEDICAL ADVICE (OUTPATIENT)
Dept: INTERNAL MEDICINE | Facility: CLINIC | Age: 59
End: 2024-12-16
Payer: COMMERCIAL

## 2024-12-16 NOTE — TELEPHONE ENCOUNTER
Outgoing call to patient. Sent to . [facility].     Patient should be advised to schedule a follow up appointment if he calls back. He should also be triaged regarding his ongoing swelling.     Natalie DUMAS RN

## 2024-12-20 PROBLEM — R60.0 PERIPHERAL EDEMA: Status: ACTIVE | Noted: 2024-12-20

## 2025-01-20 ENCOUNTER — MYC MEDICAL ADVICE (OUTPATIENT)
Dept: INTERNAL MEDICINE | Facility: CLINIC | Age: 60
End: 2025-01-20
Payer: COMMERCIAL

## 2025-01-20 ENCOUNTER — MYC REFILL (OUTPATIENT)
Dept: INTERNAL MEDICINE | Facility: CLINIC | Age: 60
End: 2025-01-20
Payer: COMMERCIAL

## 2025-01-20 DIAGNOSIS — I10 ESSENTIAL (PRIMARY) HYPERTENSION: ICD-10-CM

## 2025-01-20 DIAGNOSIS — G25.0 ESSENTIAL TREMOR: ICD-10-CM

## 2025-01-20 RX ORDER — LISINOPRIL 40 MG/1
40 TABLET ORAL DAILY
Qty: 90 TABLET | Refills: 2 | Status: SHIPPED | OUTPATIENT
Start: 2025-01-20

## 2025-01-20 RX ORDER — PROPRANOLOL HYDROCHLORIDE 60 MG/1
60 CAPSULE, EXTENDED RELEASE ORAL DAILY
Qty: 90 CAPSULE | Refills: 1 | OUTPATIENT
Start: 2025-01-20

## 2025-01-21 ENCOUNTER — OFFICE VISIT (OUTPATIENT)
Dept: INTERNAL MEDICINE | Facility: CLINIC | Age: 60
End: 2025-01-21
Payer: COMMERCIAL

## 2025-01-21 VITALS
DIASTOLIC BLOOD PRESSURE: 80 MMHG | RESPIRATION RATE: 20 BRPM | WEIGHT: 219 LBS | HEART RATE: 80 BPM | SYSTOLIC BLOOD PRESSURE: 136 MMHG | OXYGEN SATURATION: 98 % | HEIGHT: 71 IN | BODY MASS INDEX: 30.66 KG/M2 | TEMPERATURE: 98.4 F

## 2025-01-21 DIAGNOSIS — G25.0 ESSENTIAL TREMOR: ICD-10-CM

## 2025-01-21 DIAGNOSIS — I10 ESSENTIAL HYPERTENSION: Primary | ICD-10-CM

## 2025-01-21 DIAGNOSIS — E55.9 VITAMIN D DEFICIENCY: ICD-10-CM

## 2025-01-21 DIAGNOSIS — R10.11 RUQ ABDOMINAL PAIN: ICD-10-CM

## 2025-01-21 PROCEDURE — G2211 COMPLEX E/M VISIT ADD ON: HCPCS | Performed by: NURSE PRACTITIONER

## 2025-01-21 PROCEDURE — 99214 OFFICE O/P EST MOD 30 MIN: CPT | Performed by: NURSE PRACTITIONER

## 2025-01-21 RX ORDER — PROPRANOLOL HYDROCHLORIDE 60 MG/1
60 CAPSULE, EXTENDED RELEASE ORAL DAILY
Qty: 90 CAPSULE | Refills: 2 | Status: SHIPPED | OUTPATIENT
Start: 2025-01-21

## 2025-01-21 NOTE — PATIENT INSTRUCTIONS
In the past, it was recommended that you stop drinking soda to help with the acid reflux/hiatal hernia problem.    Please cut out soda completely.    Lets trend your symptoms over the next month or so.  If the problem persists, we will have you go back and see Dr. Blair.    Blood pressure looks good today.    Please make sure you are taking your vitamin D.    You will be due for COVID shot/influenza shot/Prevnar 20 pneumonia shot at some point.

## 2025-01-21 NOTE — PROGRESS NOTES
"  Assessment & Plan     RUQ abdominal pain/epigastric discomfort/acid reflux  He has a small hiatal hernia.  We reviewed his esophagram results from 2023.  He did see Dr. Blair and it was recommended that he discontinue soda.  Unfortunately he continues to drink soda, usually 3/day.    We talked about switching from omeprazole to pantoprazole.  He would like to try concerted effort and stopping the soda consumption.  If after stopping the soda he still having symptoms after 6 to 8 weeks, he will go back and see Dr. Blair    - omeprazole (PRILOSEC) 20 MG DR capsule; Take 1 capsule (20 mg) by mouth 2 times daily.    Essential hypertension  Controlled on lisinopril, amlodipine.  He uses propranolol for essential tremor    Vitamin D deficiency  Now on replacement, has been taking vitamin D for about a week.  We reviewed his labs from December    Essential tremor  - propranolol ER (INDERAL LA) 60 MG 24 hr capsule; Take 1 capsule (60 mg) by mouth daily.    Patient Instructions   In the past, it was recommended that you stop drinking soda to help with the acid reflux/hiatal hernia problem.    Please cut out soda completely.    Lets trend your symptoms over the next month or so.  If the problem persists, we will have you go back and see Dr. Blair.    Blood pressure looks good today.    Please make sure you are taking your vitamin D.    You will be due for COVID shot/influenza shot/Prevnar 20 pneumonia shot at some point.      The longitudinal plan of care for the diagnosis(es)/condition(s) as documented were addressed during this visit. Due to the added complexity in care, I will continue to support Arthur in the subsequent management and with ongoing continuity of care.            BMI  Estimated body mass index is 30.54 kg/m  as calculated from the following:    Height as of this encounter: 1.803 m (5' 11\").    Weight as of this encounter: 99.3 kg (219 lb).             Subjective   Arthur is a 59 year old, presenting for " "the following health issues:  Hypertension (BP check, follow up leg swelling , better but still some swelling in right ankle)      1/21/2025     7:14 AM   Additional Questions   Roomed by Lisa ZEE     History of Present Illness       Hypertension: He presents for follow up of hypertension.  He does not check blood pressure  regularly outside of the clinic. Outside blood pressures have been over 140/90. He does not follow a low salt diet.     Reason for visit:  Follow up    He eats 0-1 servings of fruits and vegetables daily.He consumes 4 sweetened beverage(s) daily.He exercises with enough effort to increase his heart rate 10 to 19 minutes per day.  He exercises with enough effort to increase his heart rate 3 or less days per week.   He is taking medications regularly.       Here for follow-up on his epigastric discomfort/acid reflux.    He will wake in the morning with symptoms.  He says they are not the worst they have ever been but they continue to be a problem.  He does not use NSAIDs.  Does not smoke.  Does not consume alcohol.  He saw general surgery for this problem in the past and was told to stop drinking soda but he continues to drink soda.    Denies any nausea or vomiting      Objective    /80 (BP Location: Right arm, Patient Position: Sitting, Cuff Size: Adult Regular)   Pulse 80   Temp 98.4  F (36.9  C)   Resp 20   Ht 1.803 m (5' 11\")   Wt 99.3 kg (219 lb)   SpO2 98%   BMI 30.54 kg/m    Body mass index is 30.54 kg/m .  Physical Exam   Abdomen is soft and nontender to palpation in all quadrants              Signed Electronically by: Jai Garvin, STEFANIA    "

## 2025-01-21 NOTE — TELEPHONE ENCOUNTER
Called pharmacy, staff verified Propranolol ER 0mg is ready for  and has 2 refills. MyChart sent to patient.

## 2025-01-23 ENCOUNTER — TRANSFERRED RECORDS (OUTPATIENT)
Dept: HEALTH INFORMATION MANAGEMENT | Facility: CLINIC | Age: 60
End: 2025-01-23
Payer: COMMERCIAL

## 2025-03-15 ENCOUNTER — HEALTH MAINTENANCE LETTER (OUTPATIENT)
Age: 60
End: 2025-03-15

## 2025-05-19 ENCOUNTER — OFFICE VISIT (OUTPATIENT)
Dept: INTERNAL MEDICINE | Facility: CLINIC | Age: 60
End: 2025-05-19
Payer: COMMERCIAL

## 2025-05-19 VITALS
RESPIRATION RATE: 20 BRPM | DIASTOLIC BLOOD PRESSURE: 94 MMHG | HEIGHT: 71 IN | WEIGHT: 213 LBS | BODY MASS INDEX: 29.82 KG/M2 | SYSTOLIC BLOOD PRESSURE: 136 MMHG | OXYGEN SATURATION: 96 % | HEART RATE: 60 BPM | TEMPERATURE: 97.7 F

## 2025-05-19 DIAGNOSIS — I10 ESSENTIAL HYPERTENSION: ICD-10-CM

## 2025-05-19 DIAGNOSIS — R10.13 EPIGASTRIC PAIN: Primary | ICD-10-CM

## 2025-05-19 DIAGNOSIS — R60.0 PERIPHERAL EDEMA: ICD-10-CM

## 2025-05-19 DIAGNOSIS — R10.12 LUQ ABDOMINAL PAIN: ICD-10-CM

## 2025-05-19 DIAGNOSIS — E55.9 VITAMIN D DEFICIENCY: ICD-10-CM

## 2025-05-19 PROBLEM — R11.0 NAUSEA: Status: RESOLVED | Noted: 2022-05-12 | Resolved: 2025-05-19

## 2025-05-19 PROCEDURE — 90677 PCV20 VACCINE IM: CPT | Performed by: NURSE PRACTITIONER

## 2025-05-19 PROCEDURE — 90471 IMMUNIZATION ADMIN: CPT | Performed by: NURSE PRACTITIONER

## 2025-05-19 PROCEDURE — 3080F DIAST BP >= 90 MM HG: CPT | Performed by: NURSE PRACTITIONER

## 2025-05-19 PROCEDURE — 3075F SYST BP GE 130 - 139MM HG: CPT | Performed by: NURSE PRACTITIONER

## 2025-05-19 PROCEDURE — G2211 COMPLEX E/M VISIT ADD ON: HCPCS | Performed by: NURSE PRACTITIONER

## 2025-05-19 PROCEDURE — 99214 OFFICE O/P EST MOD 30 MIN: CPT | Mod: 25 | Performed by: NURSE PRACTITIONER

## 2025-05-19 RX ORDER — VALSARTAN 320 MG/1
320 TABLET ORAL DAILY
Qty: 90 TABLET | Refills: 1 | Status: SHIPPED | OUTPATIENT
Start: 2025-05-19

## 2025-05-19 RX ORDER — PANTOPRAZOLE SODIUM 40 MG/1
40 TABLET, DELAYED RELEASE ORAL DAILY
Qty: 90 TABLET | Refills: 0 | Status: SHIPPED | OUTPATIENT
Start: 2025-05-19

## 2025-05-19 NOTE — PROGRESS NOTES
Assessment & Plan     Epigastric pain  We reviewed our note from 1/21; he did cut down on his soda, down to 1/day.    He is still having epigastric abdominal pain with acid reflux.  We will trial a switch from omeprazole to pantoprazole.  He will follow-up for blood pressure recheck in a few weeks.  If he is still having significant symptoms with no evidence of slight improvement, refer back to Dr. Blair.    - pantoprazole (PROTONIX) 40 MG EC tablet; Take 1 tablet (40 mg) by mouth daily.    Vitamin D deficiency  He says he is taking his vitamin D a bit more consistently.  Consider checking vitamin D levels at follow-up appointment    Essential hypertension  Diastolic is greater than 90.  Greater than 90 at home as well.  Stop lisinopril, start valsartan.  Follow-up in 3 weeks for blood pressure recheck.    Continue on propranolol (essential tremor), amlodipine 5 mg    - valsartan (DIOVAN) 320 MG tablet; Take 1 tablet (320 mg) by mouth daily.    Peripheral edema  History of peripheral edema problems on higher dose amlodipine.  Continue on amlodipine 5 mg daily.    LUQ abdominal pain  He has left upper quadrant abdominal pain.  Seems to be positional.  Suspect this is a musculoskeletal issue.  We talked about checking an x-ray and abdominal ultrasound    - US Abdomen Complete; Future  - XR Ribs & Chest Left G/E 3 Views; Future    Patient Instructions   Stop lisinopril.    Start valsartan.    Continue taking the amlodipine and the propranolol.    Stop omeprazole.  Switch to pantoprazole.  Try to eliminate soda completely.    Pneumonia shot today.    We can help set up imaging appointments for x-ray.  You can call [phone] to schedule the abdominal ultrasound.    Come back and see me in 3 weeks for blood pressure recheck.  In the meantime, make sure that you reduce salt.  We can check some fasting labs at that time      The longitudinal plan of care for the diagnosis(es)/condition(s) as documented were addressed  "during this visit. Due to the added complexity in care, I will continue to support Arthur in the subsequent management and with ongoing continuity of care.        BMI  Estimated body mass index is 29.71 kg/m  as calculated from the following:    Height as of this encounter: 1.803 m (5' 11\").    Weight as of this encounter: 96.6 kg (213 lb).             Subjective   Arthur is a 59 year old, presenting for the following health issues:    Recheck Medication (Med check, BP and cholesterol check, discuss hernia, pain left side x 2-3 weeks, getting worse - Not fasting )      5/19/2025     6:50 AM   Additional Questions   Roomed by Lisa ZEE     History of Present Illness       Hyperlipidemia:  He presents for follow up of hyperlipidemia.   He is taking medication to lower cholesterol. He is not having myalgia or other side effects to statin medications.    Hypertension: He presents for follow up of hypertension.  He does not check blood pressure  regularly outside of the clinic. Outside blood pressures have been over 140/90. He does not follow a low salt diet.     Reason for visit:  Follow up    He eats 0-1 servings of fruits and vegetables daily.He consumes 1 sweetened beverage(s) daily.He exercises with enough effort to increase his heart rate 10 to 19 minutes per day.  He exercises with enough effort to increase his heart rate 3 or less days per week.   He is taking medications regularly.      Here for blood pressure check.  Home diastolic readings have been greater than 90, consistently.  Unsure about dietary sodium but does not suspect that this is a major contributor.    Continues to have epigastric abdominal pain with acid reflux.  Did cut down on soda but still continues with 1 ginger ale per day.    Does not smoke.  No history of COPD.    Taking his vitamin D but missing a few doses.          Objective    BP (!) 136/94 (BP Location: Right arm, Patient Position: Sitting, Cuff Size: Adult Regular)   Pulse 60   " "Temp 97.7  F (36.5  C)   Resp 20   Ht 1.803 m (5' 11\")   Wt 96.6 kg (213 lb)   SpO2 96%   BMI 29.71 kg/m    Body mass index is 29.71 kg/m .  Physical Exam   No discomfort with palpation to the left upper quadrant.  Patient is otherwise healthy appearing and in no acute distress.              Signed Electronically by: Jai Garvin CNP    "

## 2025-05-19 NOTE — PATIENT INSTRUCTIONS
Stop lisinopril.    Start valsartan.    Continue taking the amlodipine and the propranolol.    Stop omeprazole.  Switch to pantoprazole.  Try to eliminate soda completely.    Pneumonia shot today.    We can help set up imaging appointments for x-ray.  You can call [phone] to schedule the abdominal ultrasound.    Come back and see me in 3 weeks for blood pressure recheck.  In the meantime, make sure that you reduce salt.  We can check some fasting labs at that time

## 2025-05-27 ENCOUNTER — TRANSFERRED RECORDS (OUTPATIENT)
Dept: HEALTH INFORMATION MANAGEMENT | Facility: CLINIC | Age: 60
End: 2025-05-27
Payer: COMMERCIAL

## 2025-05-28 ENCOUNTER — HOSPITAL ENCOUNTER (OUTPATIENT)
Dept: ULTRASOUND IMAGING | Facility: HOSPITAL | Age: 60
Discharge: HOME | End: 2025-05-28
Attending: NURSE PRACTITIONER
Payer: COMMERCIAL

## 2025-05-28 ENCOUNTER — ANCILLARY PROCEDURE (OUTPATIENT)
Dept: GENERAL RADIOLOGY | Facility: CLINIC | Age: 60
End: 2025-05-28
Attending: NURSE PRACTITIONER
Payer: COMMERCIAL

## 2025-05-28 DIAGNOSIS — R10.12 LUQ ABDOMINAL PAIN: ICD-10-CM

## 2025-05-28 PROCEDURE — 76700 US EXAM ABDOM COMPLETE: CPT

## 2025-05-28 PROCEDURE — 71101 X-RAY EXAM UNILAT RIBS/CHEST: CPT | Mod: TC | Performed by: STUDENT IN AN ORGANIZED HEALTH CARE EDUCATION/TRAINING PROGRAM

## 2025-05-29 ENCOUNTER — RESULTS FOLLOW-UP (OUTPATIENT)
Dept: INTERNAL MEDICINE | Facility: CLINIC | Age: 60
End: 2025-05-29

## 2025-06-09 ENCOUNTER — OFFICE VISIT (OUTPATIENT)
Dept: INTERNAL MEDICINE | Facility: CLINIC | Age: 60
End: 2025-06-09
Payer: COMMERCIAL

## 2025-06-09 ENCOUNTER — RESULTS FOLLOW-UP (OUTPATIENT)
Dept: INTERNAL MEDICINE | Facility: CLINIC | Age: 60
End: 2025-06-09

## 2025-06-09 VITALS
BODY MASS INDEX: 29.96 KG/M2 | HEIGHT: 71 IN | DIASTOLIC BLOOD PRESSURE: 88 MMHG | WEIGHT: 214 LBS | HEART RATE: 54 BPM | TEMPERATURE: 98 F | OXYGEN SATURATION: 97 % | RESPIRATION RATE: 20 BRPM | SYSTOLIC BLOOD PRESSURE: 128 MMHG

## 2025-06-09 DIAGNOSIS — K21.9 GASTROESOPHAGEAL REFLUX DISEASE WITHOUT ESOPHAGITIS: ICD-10-CM

## 2025-06-09 DIAGNOSIS — E55.9 VITAMIN D DEFICIENCY: Primary | ICD-10-CM

## 2025-06-09 DIAGNOSIS — R42 DIZZINESS OF UNKNOWN CAUSE: Primary | ICD-10-CM

## 2025-06-09 DIAGNOSIS — I10 ESSENTIAL HYPERTENSION: ICD-10-CM

## 2025-06-09 DIAGNOSIS — E78.2 MIXED DYSLIPIDEMIA: ICD-10-CM

## 2025-06-09 DIAGNOSIS — E55.9 VITAMIN D DEFICIENCY: ICD-10-CM

## 2025-06-09 LAB
ALBUMIN SERPL BCG-MCNC: 4.3 G/DL (ref 3.5–5.2)
ALP SERPL-CCNC: 114 U/L (ref 40–150)
ALT SERPL W P-5'-P-CCNC: 27 U/L (ref 0–70)
ANION GAP SERPL CALCULATED.3IONS-SCNC: 11 MMOL/L (ref 7–15)
AST SERPL W P-5'-P-CCNC: 28 U/L (ref 0–45)
BILIRUB SERPL-MCNC: 0.9 MG/DL
BUN SERPL-MCNC: 9.6 MG/DL (ref 8–23)
CALCIUM SERPL-MCNC: 10.4 MG/DL (ref 8.8–10.4)
CHLORIDE SERPL-SCNC: 105 MMOL/L (ref 98–107)
CHOLEST SERPL-MCNC: 130 MG/DL
CREAT SERPL-MCNC: 0.96 MG/DL (ref 0.67–1.17)
EGFRCR SERPLBLD CKD-EPI 2021: 90 ML/MIN/1.73M2
FASTING STATUS PATIENT QL REPORTED: YES
FASTING STATUS PATIENT QL REPORTED: YES
GLUCOSE SERPL-MCNC: 106 MG/DL (ref 70–99)
HCO3 SERPL-SCNC: 25 MMOL/L (ref 22–29)
HDLC SERPL-MCNC: 27 MG/DL
LDLC SERPL CALC-MCNC: 59 MG/DL
NONHDLC SERPL-MCNC: 103 MG/DL
POTASSIUM SERPL-SCNC: 3.7 MMOL/L (ref 3.4–5.3)
PROT SERPL-MCNC: 6.6 G/DL (ref 6.4–8.3)
SODIUM SERPL-SCNC: 141 MMOL/L (ref 135–145)
TRIGL SERPL-MCNC: 219 MG/DL
VIT D+METAB SERPL-MCNC: 17 NG/ML (ref 20–50)

## 2025-06-09 PROCEDURE — 82306 VITAMIN D 25 HYDROXY: CPT | Performed by: NURSE PRACTITIONER

## 2025-06-09 PROCEDURE — 3074F SYST BP LT 130 MM HG: CPT | Performed by: NURSE PRACTITIONER

## 2025-06-09 PROCEDURE — 80053 COMPREHEN METABOLIC PANEL: CPT | Performed by: NURSE PRACTITIONER

## 2025-06-09 PROCEDURE — 80061 LIPID PANEL: CPT | Performed by: NURSE PRACTITIONER

## 2025-06-09 PROCEDURE — 36415 COLL VENOUS BLD VENIPUNCTURE: CPT | Performed by: NURSE PRACTITIONER

## 2025-06-09 PROCEDURE — 99214 OFFICE O/P EST MOD 30 MIN: CPT | Performed by: NURSE PRACTITIONER

## 2025-06-09 PROCEDURE — G2211 COMPLEX E/M VISIT ADD ON: HCPCS | Performed by: NURSE PRACTITIONER

## 2025-06-09 PROCEDURE — 3079F DIAST BP 80-89 MM HG: CPT | Performed by: NURSE PRACTITIONER

## 2025-06-09 NOTE — PATIENT INSTRUCTIONS
Blood pressure is looking better.  Continue on the valsartan for now.    No other changes to prescription medications right now.    I would completely eliminate the soda.  If you are still having significant heartburn/epigastric abdominal discomfort despite stopping the soda completely, let me know and we can refer you back to Dr. Blair.    Recheck cholesterol labs today.    Recheck vitamin D levels.    You will be due for a complete physical exam with me in 6 months.

## 2025-06-09 NOTE — PROGRESS NOTES
"  Assessment & Plan     Dizziness of unknown cause  Continues on sertraline.  This was initiated by [facility] for his chronic dizziness.  He has also seen the [facility]    Gastroesophageal reflux disease without esophagitis  This is improved since switching to pantoprazole.  However, still having 1 soda per day.  We talked about discontinuing the soda altogether and if his symptoms still persist he can go back and see Dr. Blair    Essential hypertension  Controlled after switching from lisinopril to valsartan.  Continues on amlodipine.  Propranolol for essential tremor  - Comprehensive metabolic panel; Future    Mixed dyslipidemia  Continues on atorvastatin 40 mg daily.  Due for labs  - Lipid panel reflex to direct LDL Non-fasting; Future    Vitamin D deficiency  Due for recheck  - Vitamin D Deficiency; Future    Patient Instructions   Blood pressure is looking better.  Continue on the valsartan for now.    No other changes to prescription medications right now.    I would completely eliminate the soda.  If you are still having significant heartburn/epigastric abdominal discomfort despite stopping the soda completely, let me know and we can refer you back to Dr. Blair.    Recheck cholesterol labs today.    Recheck vitamin D levels.    You will be due for a complete physical exam with me in 6 months.      The longitudinal plan of care for the diagnosis(es)/condition(s) as documented were addressed during this visit. Due to the added complexity in care, I will continue to support Arthur in the subsequent management and with ongoing continuity of care.            BMI  Estimated body mass index is 29.85 kg/m  as calculated from the following:    Height as of this encounter: 1.803 m (5' 11\").    Weight as of this encounter: 97.1 kg (214 lb).             Subjective   Arthur is a 59 year old, presenting for the following health issues:  Hypertension (BP check - fasting )      6/9/2025     6:52 " "AM   Additional Questions   Roomed by Lisa ZEE     History of Present Illness       Hyperlipidemia:  He presents for follow up of hyperlipidemia.   He is taking medication to lower cholesterol. He is not having myalgia or other side effects to statin medications.    He eats 0-1 servings of fruits and vegetables daily.He consumes 1 sweetened beverage(s) daily.He exercises with enough effort to increase his heart rate 9 or less minutes per day.  He exercises with enough effort to increase his heart rate 3 or less days per week.   He is taking medications regularly.        Here for blood pressure recheck.  Home checks similar to today, 130s over high 80s.    Still having some mild epigastric abdominal pain but better since switching to pantoprazole.  Still having some left upper quadrant abdominal pain but this has improved by about 50%.    We did check x-ray and ultrasound at our last visit, those results came back unremarkable.      Objective    /88 (BP Location: Right arm, Patient Position: Sitting, Cuff Size: Adult Regular)   Pulse 54   Temp 98  F (36.7  C)   Resp 20   Ht 1.803 m (5' 11\")   Wt 97.1 kg (214 lb)   SpO2 97%   BMI 29.85 kg/m    Body mass index is 29.85 kg/m .  Physical Exam   Patient healthy appearing and in no acute distress              Signed Electronically by: Jai Garivn CNP    "

## 2025-06-24 DIAGNOSIS — E78.2 MIXED DYSLIPIDEMIA: ICD-10-CM

## 2025-06-24 RX ORDER — ATORVASTATIN CALCIUM 40 MG/1
40 TABLET, FILM COATED ORAL DAILY
Qty: 90 TABLET | Refills: 2 | Status: SHIPPED | OUTPATIENT
Start: 2025-06-24

## 2025-07-28 ENCOUNTER — TRANSFERRED RECORDS (OUTPATIENT)
Dept: HEALTH INFORMATION MANAGEMENT | Facility: CLINIC | Age: 60
End: 2025-07-28
Payer: COMMERCIAL

## 2025-08-14 DIAGNOSIS — R10.13 EPIGASTRIC PAIN: ICD-10-CM

## 2025-08-14 RX ORDER — PANTOPRAZOLE SODIUM 40 MG/1
40 TABLET, DELAYED RELEASE ORAL DAILY
Qty: 90 TABLET | Refills: 2 | Status: SHIPPED | OUTPATIENT
Start: 2025-08-14